# Patient Record
Sex: MALE | Race: WHITE | NOT HISPANIC OR LATINO | Employment: UNEMPLOYED | URBAN - METROPOLITAN AREA
[De-identification: names, ages, dates, MRNs, and addresses within clinical notes are randomized per-mention and may not be internally consistent; named-entity substitution may affect disease eponyms.]

---

## 2020-01-31 ENCOUNTER — OFFICE VISIT (OUTPATIENT)
Dept: URGENT CARE | Facility: CLINIC | Age: 45
End: 2020-01-31
Payer: COMMERCIAL

## 2020-01-31 VITALS
DIASTOLIC BLOOD PRESSURE: 66 MMHG | BODY MASS INDEX: 24.25 KG/M2 | OXYGEN SATURATION: 98 % | RESPIRATION RATE: 18 BRPM | TEMPERATURE: 99 F | HEART RATE: 58 BPM | HEIGHT: 68 IN | SYSTOLIC BLOOD PRESSURE: 117 MMHG | WEIGHT: 160 LBS

## 2020-01-31 DIAGNOSIS — Z20.2 STD EXPOSURE: Primary | ICD-10-CM

## 2020-01-31 PROBLEM — E78.6 LOW HDL (UNDER 40): Status: ACTIVE | Noted: 2019-12-10

## 2020-01-31 PROCEDURE — 96372 PR INJECTION,THERAP/PROPH/DIAG2ST, IM OR SUBCUT: ICD-10-PCS | Mod: S$GLB,,, | Performed by: FAMILY MEDICINE

## 2020-01-31 PROCEDURE — 96372 THER/PROPH/DIAG INJ SC/IM: CPT | Mod: S$GLB,,, | Performed by: FAMILY MEDICINE

## 2020-01-31 PROCEDURE — 87491 CHLMYD TRACH DNA AMP PROBE: CPT | Mod: 91

## 2020-01-31 PROCEDURE — 87661 TRICHOMONAS VAGINALIS AMPLIF: CPT

## 2020-01-31 PROCEDURE — 99213 PR OFFICE/OUTPT VISIT, EST, LEVL III, 20-29 MIN: ICD-10-PCS | Mod: 25,S$GLB,, | Performed by: FAMILY MEDICINE

## 2020-01-31 PROCEDURE — 99213 OFFICE O/P EST LOW 20 MIN: CPT | Mod: 25,S$GLB,, | Performed by: FAMILY MEDICINE

## 2020-01-31 PROCEDURE — 87591 N.GONORRHOEAE DNA AMP PROB: CPT

## 2020-01-31 RX ORDER — EMTRICITABINE AND TENOFOVIR DISOPROXIL FUMARATE 200; 300 MG/1; MG/1
1 TABLET, FILM COATED ORAL
COMMUNITY
Start: 2019-11-04

## 2020-01-31 RX ORDER — AZITHROMYCIN 500 MG/1
1000 TABLET, FILM COATED ORAL DAILY
Qty: 2 TABLET | Refills: 0 | Status: SHIPPED | OUTPATIENT
Start: 2020-01-31 | End: 2020-02-01

## 2020-01-31 RX ORDER — CEFTRIAXONE 250 MG/1
250 INJECTION, POWDER, FOR SOLUTION INTRAMUSCULAR; INTRAVENOUS
Status: COMPLETED | OUTPATIENT
Start: 2020-01-31 | End: 2020-01-31

## 2020-01-31 RX ORDER — KETOCONAZOLE 20 MG/ML
SHAMPOO, SUSPENSION TOPICAL
COMMUNITY
Start: 2020-01-06 | End: 2023-03-02

## 2020-01-31 RX ADMIN — CEFTRIAXONE 250 MG: 250 INJECTION, POWDER, FOR SOLUTION INTRAMUSCULAR; INTRAVENOUS at 01:01

## 2020-01-31 NOTE — PATIENT INSTRUCTIONS
Understanding STDs    When it comes to sex, nothing is risk-free. Any sexual contact with the penis, vagina, anus, or mouth can spread a sexually transmitted disease (STD). The only sure way to prevent STDs is abstinence (not having sex). But there are ways to make sex safer. Use a latex condom each time you have sex. And choose your partner wisely.  Use condoms for safer sex  If you have sex, latex condoms provide the best protection against STDs. Latex condoms stop the exchange of body fluids that carry certain STDs. They also limit contact with affected skin. Be aware though, a condom doesnt cover all skin. So, affected skin that is not covered can still transfer disease. But youre safer with a condom than without one. Use a condom even if you use other birth control. While birth control methods like the pill or IUD help prevent pregnancy, they do not protect against STDs.  Choose the right condom  Condoms made of latex prevent disease best. If youre allergic to latex, use polyurethane condoms instead. Male condoms fit over the penis. Female condoms line the vagina. Before buying a condom, read the label to be sure it prevents disease. Some novelty condoms dont.  The right lubricant helps  Buy lubricated condoms or use lubricant. This provides greater comfort and reduces the risk of condom breakage. Use only water-based lubricants. Dont use oil, lotion, or petroleum jelly. They can weaken the condom, causing breakage. Also, you may want to choose lubricants without nonoxynol-9. Its now known that this spermicide does not prevent disease and may cause irritation.  Use condoms correctly  For condoms to work, they must be used the right way. Keep these tips in mind:  · Use a new latex condom each time you have sex. Slip the condom on the penis before any contact is made.  · When ready to withdraw, hold the rim of the condom as the penis pulls out. This prevents the condom from slipping off.  · Check the  expiration date before using a condom.  · Dont store condoms in places that can get hot, such as a car or a wallet that is carried in a back pocket.  Get to know your partner  Safer sex is a process. It involves getting to know your partner and making informed choices. Ask each other how many partners you have had in the past, and how many you have now. Find out if either of you has an STD. If you decide to have sex, use a condom each time. Dont stop using condoms unless youre sure neither of you has other partners and youve both been tested to confirm you dont have STDs. Then stay free of disease by having sex only with each other (monogamy).  Keep your cool  Dont let alcohol or drugs cloud your judgment. They could lead you to have sex with someone you wouldnt have chosen if you were sober. Or, you might forget to use a condom. If you do plan to have sex, keep a latex condom with you. Dont wait until youre in the heat of passion to try to find one.  Consider abstinence  The only way to be sure you wont get an STD is to abstain from sex. Abstinence is a choice that many people make at some point in their lives. Maybe you want to wait until you are sure youre ready before you have sex. Maybe youd like a break from the responsibilities of sex for a while. Or maybe you just want to know your partner better before taking the next step. Abstinence is a choice you can make now to protect your future.  Date Last Reviewed: 12/1/2016  © 4872-2957 [employer]. [address] [place] [address], [place] [address]. All rights reserved. This information is not intended as a substitute for professional medical care. Always follow your healthcare professional's instructions.

## 2020-01-31 NOTE — PROGRESS NOTES
"Subjective:       Patient ID: Thomas Taveras is a 44 y.o. male.    Vitals:  height is 5' 8" (1.727 m) and weight is 72.6 kg (160 lb). His temperature is 98.6 °F (37 °C). His blood pressure is 117/66 and his pulse is 58 (abnormal). His respiration is 18 and oxygen saturation is 98%.     Chief Complaint: Exposure to STD    Pt had unprotected sex 3 days ago and is concerned about std exposure. Pt is not having any symptoms but was told that his sexual partner was having std symptoms.    Exposure to STD   This is a new problem. The current episode started in the past 7 days. The patient is experiencing no pain. Pertinent negatives include no chest pain, chills, coughing, diarrhea, dysuria, fever, frequency, headaches, nausea, rash, shortness of breath, sore throat, urgency or vomiting. Nothing aggravates the symptoms. He has tried nothing for the symptoms. He is sexually active. He inconsistently uses condoms. It is unknown whether or not his partner has an STD.       Constitution: Negative for chills, fatigue and fever.   HENT: Negative for congestion and sore throat.    Neck: Negative for painful lymph nodes.   Cardiovascular: Negative for chest pain and leg swelling.   Eyes: Negative for double vision and blurred vision.   Respiratory: Negative for cough and shortness of breath.    Gastrointestinal: Negative for nausea, vomiting and diarrhea.   Genitourinary: Negative for dysuria, frequency and urgency.   Musculoskeletal: Negative for joint pain, joint swelling, muscle cramps and muscle ache.   Skin: Negative for color change, pale and rash.   Allergic/Immunologic: Negative for seasonal allergies.   Neurological: Negative for dizziness, history of vertigo, light-headedness, passing out and headaches.   Hematologic/Lymphatic: Negative for swollen lymph nodes, easy bruising/bleeding and history of blood clots. Does not bruise/bleed easily.   Psychiatric/Behavioral: Negative for nervous/anxious, sleep disturbance and " depression. The patient is not nervous/anxious.        Objective:      Physical Exam   Constitutional: He appears well-developed and well-nourished.   Genitourinary:   Genitourinary Comments: Deferred -pt without symptoms   Psychiatric: He has a normal mood and affect. His behavior is normal. Judgment and thought content normal.   Nursing note and vitals reviewed.        Assessment:       1. STD exposure        Plan:         STD exposure  -     C. trachomatis/N. gonorrhoeae by AMP DNA  -     Trichomonas vaginalis, RNA, Qual, Urine (Males Only)  -     Misc. C trach/N gonor Amplified RNA Rectal  -     cefTRIAXone injection 250 mg  -     azithromycin (ZITHROMAX) 500 MG tablet; Take 2 tablets (1,000 mg total) by mouth once daily. for 1 day  Dispense: 2 tablet; Refill: 0      Patient Instructions     Understanding STDs    When it comes to sex, nothing is risk-free. Any sexual contact with the penis, vagina, anus, or mouth can spread a sexually transmitted disease (STD). The only sure way to prevent STDs is abstinence (not having sex). But there are ways to make sex safer. Use a latex condom each time you have sex. And choose your partner wisely.  Use condoms for safer sex  If you have sex, latex condoms provide the best protection against STDs. Latex condoms stop the exchange of body fluids that carry certain STDs. They also limit contact with affected skin. Be aware though, a condom doesnt cover all skin. So, affected skin that is not covered can still transfer disease. But youre safer with a condom than without one. Use a condom even if you use other birth control. While birth control methods like the pill or IUD help prevent pregnancy, they do not protect against STDs.  Choose the right condom  Condoms made of latex prevent disease best. If youre allergic to latex, use polyurethane condoms instead. Male condoms fit over the penis. Female condoms line the vagina. Before buying a condom, read the label to be sure it  prevents disease. Some novelty condoms dont.  The right lubricant helps  Buy lubricated condoms or use lubricant. This provides greater comfort and reduces the risk of condom breakage. Use only water-based lubricants. Dont use oil, lotion, or petroleum jelly. They can weaken the condom, causing breakage. Also, you may want to choose lubricants without nonoxynol-9. Its now known that this spermicide does not prevent disease and may cause irritation.  Use condoms correctly  For condoms to work, they must be used the right way. Keep these tips in mind:  · Use a new latex condom each time you have sex. Slip the condom on the penis before any contact is made.  · When ready to withdraw, hold the rim of the condom as the penis pulls out. This prevents the condom from slipping off.  · Check the expiration date before using a condom.  · Dont store condoms in places that can get hot, such as a car or a wallet that is carried in a back pocket.  Get to know your partner  Safer sex is a process. It involves getting to know your partner and making informed choices. Ask each other how many partners you have had in the past, and how many you have now. Find out if either of you has an STD. If you decide to have sex, use a condom each time. Dont stop using condoms unless youre sure neither of you has other partners and youve both been tested to confirm you dont have STDs. Then stay free of disease by having sex only with each other (monogamy).  Keep your cool  Dont let alcohol or drugs cloud your judgment. They could lead you to have sex with someone you wouldnt have chosen if you were sober. Or, you might forget to use a condom. If you do plan to have sex, keep a latex condom with you. Dont wait until youre in the heat of passion to try to find one.  Consider abstinence  The only way to be sure you wont get an STD is to abstain from sex. Abstinence is a choice that many people make at some point in their lives. Maybe  you want to wait until you are sure youre ready before you have sex. Maybe youd like a break from the responsibilities of sex for a while. Or maybe you just want to know your partner better before taking the next step. Abstinence is a choice you can make now to protect your future.  Date Last Reviewed: 12/1/2016  © 3995-6634 [employer]. [address] [place] [address]. All rights reserved. This information is not intended as a substitute for professional medical care. Always follow your healthcare professional's instructions.

## 2020-02-03 LAB
C TRACH DNA SPEC QL NAA+PROBE: NOT DETECTED
N GONORRHOEA DNA SPEC QL NAA+PROBE: NOT DETECTED

## 2020-02-04 LAB
C TRACH RRNA SPEC QL NAA+PROBE: NEGATIVE
C.TRACH RNA SOURCE: NORMAL
N.GONORROHEAE, AMP RNA SOURCE: NORMAL
N.GONORROHEAE, MISC. AMP RNA: NEGATIVE
T VAGINALIS RRNA SPEC QL NAA+PROBE: NOT DETECTED
TRICHOMONAS VAGINALIS RNA, QUAL, SOURCE: NORMAL

## 2020-02-05 ENCOUNTER — TELEPHONE (OUTPATIENT)
Dept: URGENT CARE | Facility: CLINIC | Age: 45
End: 2020-02-05

## 2022-10-24 DIAGNOSIS — M25.561 RIGHT KNEE PAIN, UNSPECIFIED CHRONICITY: Primary | ICD-10-CM

## 2022-10-25 ENCOUNTER — OFFICE VISIT (OUTPATIENT)
Dept: ORTHOPEDICS | Facility: CLINIC | Age: 47
End: 2022-10-25
Payer: COMMERCIAL

## 2022-10-25 ENCOUNTER — HOSPITAL ENCOUNTER (OUTPATIENT)
Dept: RADIOLOGY | Facility: HOSPITAL | Age: 47
Discharge: HOME | End: 2022-10-25
Attending: ORTHOPAEDIC SURGERY
Payer: COMMERCIAL

## 2022-10-25 VITALS
BODY MASS INDEX: 24.26 KG/M2 | SYSTOLIC BLOOD PRESSURE: 128 MMHG | HEIGHT: 68 IN | DIASTOLIC BLOOD PRESSURE: 74 MMHG | WEIGHT: 160.06 LBS

## 2022-10-25 DIAGNOSIS — M25.561 RIGHT KNEE PAIN, UNSPECIFIED CHRONICITY: ICD-10-CM

## 2022-10-25 DIAGNOSIS — M23.91 INTERNAL DERANGEMENT OF RIGHT KNEE: Primary | ICD-10-CM

## 2022-10-25 PROCEDURE — 73564 X-RAY EXAM KNEE 4 OR MORE: CPT | Mod: TC,FY,RT

## 2022-10-25 PROCEDURE — 3078F PR MOST RECENT DIASTOLIC BLOOD PRESSURE < 80 MM HG: ICD-10-PCS | Mod: CPTII,S$GLB,, | Performed by: ORTHOPAEDIC SURGERY

## 2022-10-25 PROCEDURE — 1159F MED LIST DOCD IN RCRD: CPT | Mod: CPTII,S$GLB,, | Performed by: ORTHOPAEDIC SURGERY

## 2022-10-25 PROCEDURE — 73564 X-RAY EXAM KNEE 4 OR MORE: CPT | Mod: 26,RT,, | Performed by: RADIOLOGY

## 2022-10-25 PROCEDURE — 1160F PR REVIEW ALL MEDS BY PRESCRIBER/CLIN PHARMACIST DOCUMENTED: ICD-10-PCS | Mod: CPTII,S$GLB,, | Performed by: ORTHOPAEDIC SURGERY

## 2022-10-25 PROCEDURE — 73564 XR KNEE COMP 4 OR MORE VIEWS RIGHT: ICD-10-PCS | Mod: 26,RT,, | Performed by: RADIOLOGY

## 2022-10-25 PROCEDURE — 99999 PR PBB SHADOW E&M-EST. PATIENT-LVL III: ICD-10-PCS | Mod: PBBFAC,,, | Performed by: ORTHOPAEDIC SURGERY

## 2022-10-25 PROCEDURE — 99204 PR OFFICE/OUTPT VISIT, NEW, LEVL IV, 45-59 MIN: ICD-10-PCS | Mod: S$GLB,,, | Performed by: ORTHOPAEDIC SURGERY

## 2022-10-25 PROCEDURE — 3074F SYST BP LT 130 MM HG: CPT | Mod: CPTII,S$GLB,, | Performed by: ORTHOPAEDIC SURGERY

## 2022-10-25 PROCEDURE — 1159F PR MEDICATION LIST DOCUMENTED IN MEDICAL RECORD: ICD-10-PCS | Mod: CPTII,S$GLB,, | Performed by: ORTHOPAEDIC SURGERY

## 2022-10-25 PROCEDURE — 99999 PR PBB SHADOW E&M-EST. PATIENT-LVL III: CPT | Mod: PBBFAC,,, | Performed by: ORTHOPAEDIC SURGERY

## 2022-10-25 PROCEDURE — 1160F RVW MEDS BY RX/DR IN RCRD: CPT | Mod: CPTII,S$GLB,, | Performed by: ORTHOPAEDIC SURGERY

## 2022-10-25 PROCEDURE — 99204 OFFICE O/P NEW MOD 45 MIN: CPT | Mod: S$GLB,,, | Performed by: ORTHOPAEDIC SURGERY

## 2022-10-25 PROCEDURE — 3074F PR MOST RECENT SYSTOLIC BLOOD PRESSURE < 130 MM HG: ICD-10-PCS | Mod: CPTII,S$GLB,, | Performed by: ORTHOPAEDIC SURGERY

## 2022-10-25 PROCEDURE — 3078F DIAST BP <80 MM HG: CPT | Mod: CPTII,S$GLB,, | Performed by: ORTHOPAEDIC SURGERY

## 2022-10-25 NOTE — PROGRESS NOTES
"Patient ID:   Thomas Taveras is a 47 y.o. male.    Chief Complaint:   Right knee pain    HPI:   The patient is an active 47-year-old male who is presenting today secondary to right knee pain.  He reports a remote injury to the right knee 4 years ago.  He is not sure felt a pop at the time recently, he was participating in [employer] jujitsu when his right lower extremity sort of got tangled up with another fighter.  Since this incident, he has had increased pain, catching, and giving way episodes.  He reports significant pain if he twists on the right knee.  He also has a remote history of a left knee injury while skiing over 10 years ago.    Medications:    Current Outpatient Medications:     emtricitabine-tenofovir 200-300 mg (TRUVADA) 200-300 mg Tab, Take 1 tablet by mouth., Disp: , Rfl:     ketoconazole (NIZORAL) 2 % shampoo, , Disp: , Rfl:     Allergies:  Review of patient's allergies indicates:  No Known Allergies    Past Medical History:  History reviewed. No pertinent past medical history.     Past Surgical History:  History reviewed. No pertinent surgical history.    Social History:  Social History     Occupational History    Not on file   Tobacco Use    Smoking status: Never    Smokeless tobacco: Never   Substance and Sexual Activity    Alcohol use: Yes    Drug use: Never    Sexual activity: Not on file       Family History:  Family History   Problem Relation Age of Onset    No Known Problems Mother     No Known Problems Father         ROS:  Review of Systems   Musculoskeletal:  Positive for joint pain, joint swelling and myalgias.   Neurological:  Negative for numbness and paresthesias.   All other systems reviewed and are negative.    Vitals:  /74   Ht 5' 8" (1.727 m)   Wt 72.6 kg (160 lb 0.9 oz)   BMI 24.34 kg/m²     Physical Examination:  Comprehensive Orthopaedic Musculoskeletal Exam    General      Constitutional: appears stated age, well-developed and well-nourished    Scleral icterus: no    " Labored breathing: no    Psychiatric: normal mood and affect and no acute distress    Neurological: alert and oriented x3    Skin: intact    Lymphadenopathy: none   Ortho Exam   Right knee exam:   Mild effusion.    No tenderness to palpation on the medial or lateral joint line today.  He has a catch laterally with Deepali's maneuver but no pain.  Range of motion is from 0-144 degrees.  Range of motion on the left side is 0-145 degrees.    Lachman's 2B.  Pivot shift 2+.  Negative posterior drawer.  Negative dial test.  No medial or lateral laxity at 30° of flexion.    Imaging:  I have ordered and independently reviewed the following imaging studies performed at [facility] today    X-Ray Knee Complete 4 Or More Views Right  Narrative: EXAMINATION:  XR KNEE COMP 4 OR MORE VIEWS RIGHT    CLINICAL HISTORY:  Pain in right knee    TECHNIQUE:  Right knee radiograph series.    COMPARISON:  None    FINDINGS:  Normal bony alignment.  Knee joint cartilage spaces appear maintained tiny patellar marginal osteophytes.  No acute fracture or dislocation.  Suspected suprapatellar effusion with nonspecific vague density projecting at the medial compartment on frontal view with some relative subcortical lucency at the marginal lateral tibial plateau.  This is overall nonspecific and further correlation with MRI suggested.  Impression: As above.    This report was flagged in Epic as abnormal.    Electronically signed by: Miyk Rayo  Date:    10/25/2022  Time:    09:21      Assessment:  1. Internal derangement of right knee      Plan:  Patient's examination is consistent with a tear of the ACL.  In addition he may have sustained a meniscus tear.  I recommended MRI scan to further evaluate.  I will contact him after the MRI scan is completed.    Orders Placed This Encounter    MRI Knee Without Contrast Right     No follow-ups on file.

## 2022-11-01 ENCOUNTER — TELEPHONE (OUTPATIENT)
Dept: ORTHOPEDICS | Facility: CLINIC | Age: 47
End: 2022-11-01
Payer: COMMERCIAL

## 2022-11-01 NOTE — TELEPHONE ENCOUNTER
----- Message from Faustina Carter sent at 11/1/2022  1:06 PM CDT -----  Regarding: call back  Contact: [phone]  Type:  Patient Returning Call    Who Called: PT   Who Left Message for Patient: Nurse   Does the patient know what this is regarding?: Yes   Would the patient rather a call back or a response via [facility]? Call back   Best Call Back Number: [phone]  Additional Information:

## 2022-11-03 ENCOUNTER — TELEPHONE (OUTPATIENT)
Dept: ORTHOPEDICS | Facility: CLINIC | Age: 47
End: 2022-11-03
Payer: COMMERCIAL

## 2022-11-03 NOTE — TELEPHONE ENCOUNTER
----- Message from Tejinder Quintana sent at 11/3/2022 11:13 AM CDT -----  Type:  Send MRI TO DIS    Who Called:tien quinn  Would the patient rather a call back or a response via [facility]? Call back   Best Call Back Number: [facility] # auth # [id number]  Additional Information:   Need order for approval   Requesting to get mri location moved to dis (diagnostic imaging services)   Please send to Phone # [phone]   fax

## 2022-11-04 ENCOUNTER — TELEPHONE (OUTPATIENT)
Dept: ORTHOPEDICS | Facility: CLINIC | Age: 47
End: 2022-11-04
Payer: COMMERCIAL

## 2022-11-04 NOTE — TELEPHONE ENCOUNTER
----- Message from Elly Alvarado, Patient Care Assistant sent at 11/4/2022  9:44 AM CDT -----  Type:  Send MRI TO DIS     Who Called:tien quinn  Would the patient rather a call back or a response via [facility]? Call back   Best Call Back Number: [facility] # auth # [id number]  Additional Information:   Need order for approval   Requesting to get mri location moved to dis (diagnostic imaging services)   Please send to Phone # [phone]   fax

## 2022-11-07 ENCOUNTER — TELEPHONE (OUTPATIENT)
Dept: ORTHOPEDICS | Facility: CLINIC | Age: 47
End: 2022-11-07
Payer: COMMERCIAL

## 2022-11-07 NOTE — TELEPHONE ENCOUNTER
----- Message from Faustina Carter sent at 11/7/2022  9:28 AM CST -----  Regarding: call back  Contact: [phone]  Type:  Send MRI TO DIS     Who Called:tien forman/ Chelsi Kidd  Would the patient rather a call back or a response via [facility]? Call back   Best Call Back Number: [facility] #[phone] auth # [id number]  Additional Information:   Need order for approval   Requesting to get mri location moved to dis (diagnostic imaging services)   Please send to Phone # [phone]   fax  fax [phone]

## 2022-11-07 NOTE — TELEPHONE ENCOUNTER
----- Message from Marisabel Jason sent at 11/7/2022 11:19 AM CST -----  Type:  Needs Medical Advice    Who Called: patient  Would the patient rather a call back or a response via [facility]? call  Best Call Back Number: [phone]  Additional Information: The MRI has been approved for Diagnostic Imaging on the [facility]----He is wondering who is going to set it up.

## 2022-11-08 ENCOUNTER — TELEPHONE (OUTPATIENT)
Dept: ORTHOPEDICS | Facility: CLINIC | Age: 47
End: 2022-11-08
Payer: COMMERCIAL

## 2022-11-08 NOTE — TELEPHONE ENCOUNTER
----- Message from Tejinder Quintana sent at 11/8/2022 10:30 AM CST -----  Type:  Patient Returning Call    Who Called:Chelsi HYATT (diagnostic imaging services)  Would the patient rather a call back or a response via [facility]? Call   Best Call Back Number:  [phone] PHONE# [phone]  Additional Information:   Caller stated they received the prescription / clinic notes and not the orders   Requesting orders for mri w/o contrast

## 2022-11-23 ENCOUNTER — OFFICE VISIT (OUTPATIENT)
Dept: ORTHOPEDICS | Facility: CLINIC | Age: 47
End: 2022-11-23
Payer: COMMERCIAL

## 2022-11-23 VITALS
DIASTOLIC BLOOD PRESSURE: 74 MMHG | HEART RATE: 58 BPM | HEIGHT: 68 IN | SYSTOLIC BLOOD PRESSURE: 124 MMHG | WEIGHT: 160.06 LBS | BODY MASS INDEX: 24.26 KG/M2

## 2022-11-23 DIAGNOSIS — S83.241A TEAR OF MEDIAL MENISCUS OF RIGHT KNEE, CURRENT, UNSPECIFIED TEAR TYPE, INITIAL ENCOUNTER: ICD-10-CM

## 2022-11-23 DIAGNOSIS — S83.511A COMPLETE TEAR OF ANTERIOR CRUCIATE LIGAMENT OF RIGHT KNEE, INITIAL ENCOUNTER: Primary | ICD-10-CM

## 2022-11-23 DIAGNOSIS — S83.281D TEAR OF LATERAL MENISCUS OF RIGHT KNEE, CURRENT, UNSPECIFIED TEAR TYPE, SUBSEQUENT ENCOUNTER: ICD-10-CM

## 2022-11-23 PROCEDURE — 99214 OFFICE O/P EST MOD 30 MIN: CPT | Mod: S$GLB,,, | Performed by: ORTHOPAEDIC SURGERY

## 2022-11-23 PROCEDURE — 1160F PR REVIEW ALL MEDS BY PRESCRIBER/CLIN PHARMACIST DOCUMENTED: ICD-10-PCS | Mod: CPTII,S$GLB,, | Performed by: ORTHOPAEDIC SURGERY

## 2022-11-23 PROCEDURE — 3008F PR BODY MASS INDEX (BMI) DOCUMENTED: ICD-10-PCS | Mod: CPTII,S$GLB,, | Performed by: ORTHOPAEDIC SURGERY

## 2022-11-23 PROCEDURE — 1160F RVW MEDS BY RX/DR IN RCRD: CPT | Mod: CPTII,S$GLB,, | Performed by: ORTHOPAEDIC SURGERY

## 2022-11-23 PROCEDURE — 1159F PR MEDICATION LIST DOCUMENTED IN MEDICAL RECORD: ICD-10-PCS | Mod: CPTII,S$GLB,, | Performed by: ORTHOPAEDIC SURGERY

## 2022-11-23 PROCEDURE — 99999 PR PBB SHADOW E&M-EST. PATIENT-LVL IV: ICD-10-PCS | Mod: PBBFAC,,, | Performed by: ORTHOPAEDIC SURGERY

## 2022-11-23 PROCEDURE — 3008F BODY MASS INDEX DOCD: CPT | Mod: CPTII,S$GLB,, | Performed by: ORTHOPAEDIC SURGERY

## 2022-11-23 PROCEDURE — 3074F SYST BP LT 130 MM HG: CPT | Mod: CPTII,S$GLB,, | Performed by: ORTHOPAEDIC SURGERY

## 2022-11-23 PROCEDURE — 99999 PR PBB SHADOW E&M-EST. PATIENT-LVL IV: CPT | Mod: PBBFAC,,, | Performed by: ORTHOPAEDIC SURGERY

## 2022-11-23 PROCEDURE — 3078F PR MOST RECENT DIASTOLIC BLOOD PRESSURE < 80 MM HG: ICD-10-PCS | Mod: CPTII,S$GLB,, | Performed by: ORTHOPAEDIC SURGERY

## 2022-11-23 PROCEDURE — 3078F DIAST BP <80 MM HG: CPT | Mod: CPTII,S$GLB,, | Performed by: ORTHOPAEDIC SURGERY

## 2022-11-23 PROCEDURE — 3074F PR MOST RECENT SYSTOLIC BLOOD PRESSURE < 130 MM HG: ICD-10-PCS | Mod: CPTII,S$GLB,, | Performed by: ORTHOPAEDIC SURGERY

## 2022-11-23 PROCEDURE — 1159F MED LIST DOCD IN RCRD: CPT | Mod: CPTII,S$GLB,, | Performed by: ORTHOPAEDIC SURGERY

## 2022-11-23 PROCEDURE — 99214 PR OFFICE/OUTPT VISIT, EST, LEVL IV, 30-39 MIN: ICD-10-PCS | Mod: S$GLB,,, | Performed by: ORTHOPAEDIC SURGERY

## 2022-11-28 ENCOUNTER — ANESTHESIA EVENT (OUTPATIENT)
Dept: SURGERY | Facility: HOSPITAL | Age: 47
End: 2022-11-28
Payer: COMMERCIAL

## 2022-11-28 RX ORDER — CEFAZOLIN SODIUM 2 G/50ML
2 SOLUTION INTRAVENOUS
Status: CANCELLED | OUTPATIENT
Start: 2022-11-28

## 2022-11-28 RX ORDER — SODIUM CHLORIDE 9 MG/ML
INJECTION, SOLUTION INTRAVENOUS CONTINUOUS
Status: CANCELLED | OUTPATIENT
Start: 2022-11-28

## 2022-11-28 NOTE — PROGRESS NOTES
"Patient ID:   Thomas Taveras is a 47 y.o. male.    Chief Complaint:   Follow-up evaluation for right knee injury    HPI:   Mr. Taveras is returning for evaluation of his right knee. He recently completed the MRI scan on the right knee. He reports improvement in his pain and swelling since his last visit.     Medications:    Current Outpatient Medications:     emtricitabine-tenofovir 200-300 mg (TRUVADA) 200-300 mg Tab, Take 1 tablet by mouth., Disp: , Rfl:     ketoconazole (NIZORAL) 2 % shampoo, , Disp: , Rfl:     Allergies:  Review of patient's allergies indicates:  No Known Allergies    Past Medical History:  History reviewed. No pertinent past medical history.     Past Surgical History:  History reviewed. No pertinent surgical history.    Social History:  Social History     Occupational History    Not on file   Tobacco Use    Smoking status: Never    Smokeless tobacco: Never   Substance and Sexual Activity    Alcohol use: Yes    Drug use: Never    Sexual activity: Not on file       Family History:  Family History   Problem Relation Age of Onset    No Known Problems Mother     No Known Problems Father         ROS:  Review of Systems   Musculoskeletal:  Positive for joint pain and joint swelling.   All other systems reviewed and are negative.    Vitals:  /74 (BP Location: Left arm, Patient Position: Sitting, BP Method: Large (Automatic))   Pulse (!) 58   Ht 5' 8" (1.727 m)   Wt 72.6 kg (160 lb 0.9 oz)   BMI 24.34 kg/m²     Physical Examination:  Comprehensive Orthopaedic Musculoskeletal Exam    General      Constitutional: appears stated age, well-developed and well-nourished    Scleral icterus: no    Labored breathing: no    Psychiatric: normal mood and affect and no acute distress    Neurological: alert and oriented x3    Skin: intact    Lymphadenopathy: none     Imaging:    I have ordered and independently reviewed the following imaging studies performed at [facility]:    STUDY  MRI RIGHT KNEE without " contrast.    CLINICAL INDICATION  Right knee pain since twisting injury in September 2022.    COMPARISON  No relevant imaging studies are available to me for review.    PROCEDURE DETAILS  Multiplanar multisequence MRI of the right knee was performed on a 1.5 Patricia GE scanner without contrast.    FINDINGS    BONES; CARTILAGE: Healing bone contusion of the posterior aspect of the lateral tibial plateau and metaphysis is evident.  The bones and articular cartilage are otherwise intact and unremarkable.  No fracture, osteonecrosis, or other acute process is evident.      MENISCI:   Medial meniscus: Focal thinning and contour deformity of the peripheral aspect of the posterior horn of the medial meniscus is consistent with tear.  The remainder of the meniscus appears grossly intact.  Lateral meniscus: Longitudinal vertical tear and fraying of the anterior horn; intact body and posterior horn.    CRUCIATE LIGAMENTS:   Anterior cruciate ligament (ACL): Subacute tear.  Posterior cruciate ligament (PCL): Intact and unremarkable for age.     COLLATERAL LIGAMENTS:  Medical collateral ligament complex (MCL): Intact and unremarkable.  Lateral collateral ligament complex (LCL): Intact and unremarkable.     JOINT FLUID: Moderate size joint effusion.  OTHER FLUID COLLECTIONS: None significant.      EXTENSION MECHANISM:   Quadriceps tendon: Intact and unremarkable for age.  Patellar tendon: Intact and unremarkable for age.  Medial patellofemoral ligament (MPFL)/retinaculum: Intact and unremarkable for age.  Lateral patellofemoral ligament (LPFL)/retinaculum: Intact and unremarkable for age.     FAT PADS: Unremarkable for age and body habitus.  OTHER: None significant.    IMPRESSION  1.  Subacute anterior cruciate ligament (ACL) tear and healing bone contusion of the lateral tibial plateau and metaphysis posteriorly.  2.  Medial meniscus tear.  3.  Lateral meniscus tear.  4.  Moderate size joint effusion.    Signature  Electronically  Signed: Wesley Zhou M.D. on 11-, 08:14 AM    Assessment:  1. Complete tear of anterior cruciate ligament of right knee, initial encounter    2. Tear of lateral meniscus of right knee, current, unspecified tear type, subsequent encounter    3. Tear of medial meniscus of right knee, current, unspecified tear type, initial encounter      Plan:  I have reviewed the MRI findings with Mr. Taveras. He is an active 47 year old male who desires a return to an active lifestyle. I have recommended right ACL reconstruction with bone patellar tendon autograft, medial and lateral meniscal treatment as indicated, and any other indicated procedures.     I have reviewed the risks, benefits, and alternatives in detail. He will contact the office if he wishes to proceed with surgery.           Orders Placed This Encounter    Diet NPO    Cleanse with Chlorhexidine (CHG)    Place GRACE hose    Place sequential compression device    Full code    IP VTE LOW RISK PATIENT    Case Request Operating Room: RECONSTRUCTION, KNEE, ACL, ARTHROSCOPIC WITH BTB AUTOGRAFT     No follow-ups on file.

## 2022-11-30 ENCOUNTER — TELEPHONE (OUTPATIENT)
Dept: ORTHOPEDICS | Facility: CLINIC | Age: 47
End: 2022-11-30
Payer: COMMERCIAL

## 2022-11-30 NOTE — TELEPHONE ENCOUNTER
Notified patient that surgery department will contact him on today after 3pm for details and time for surgery

## 2022-11-30 NOTE — TELEPHONE ENCOUNTER
----- Message from Raissa Gay sent at 11/30/2022  9:02 AM CST -----  Type:  Needs Medical Advice    Who Called: Pt  Would the patient rather a call back or a response via [facility]? call  Best Call Back Number: [phone]  Additional Information: pt calling regarding  Procedures for 12/01/2022 pt would like to know the time and location please call pt

## 2022-12-01 ENCOUNTER — HOSPITAL ENCOUNTER (OUTPATIENT)
Facility: HOSPITAL | Age: 47
Discharge: HOME | End: 2022-12-01
Attending: ORTHOPAEDIC SURGERY | Admitting: ORTHOPAEDIC SURGERY
Payer: COMMERCIAL

## 2022-12-01 ENCOUNTER — ANESTHESIA (OUTPATIENT)
Dept: SURGERY | Facility: HOSPITAL | Age: 47
End: 2022-12-01
Payer: COMMERCIAL

## 2022-12-01 DIAGNOSIS — S83.511A RIGHT ACL TEAR: ICD-10-CM

## 2022-12-01 DIAGNOSIS — S83.511A COMPLETE TEAR OF ANTERIOR CRUCIATE LIGAMENT OF RIGHT KNEE, INITIAL ENCOUNTER: Primary | ICD-10-CM

## 2022-12-01 DIAGNOSIS — M94.261 CHONDROMALACIA OF RIGHT KNEE: ICD-10-CM

## 2022-12-01 PROCEDURE — 25000003 PHARM REV CODE 250: Performed by: NURSE ANESTHETIST, CERTIFIED REGISTERED

## 2022-12-01 PROCEDURE — 64447 NJX AA&/STRD FEMORAL NRV IMG: CPT | Performed by: ANESTHESIOLOGY

## 2022-12-01 PROCEDURE — 63600175 PHARM REV CODE 636 W HCPCS: Performed by: NURSE ANESTHETIST, CERTIFIED REGISTERED

## 2022-12-01 PROCEDURE — 37000009 HC ANESTHESIA EA ADD 15 MINS: Performed by: ORTHOPAEDIC SURGERY

## 2022-12-01 PROCEDURE — 71000016 HC POSTOP RECOV ADDL HR: Performed by: ORTHOPAEDIC SURGERY

## 2022-12-01 PROCEDURE — 71000039 HC RECOVERY, EACH ADD'L HOUR: Performed by: ORTHOPAEDIC SURGERY

## 2022-12-01 PROCEDURE — 25000003 PHARM REV CODE 250: Performed by: STUDENT IN AN ORGANIZED HEALTH CARE EDUCATION/TRAINING PROGRAM

## 2022-12-01 PROCEDURE — 27201423 OPTIME MED/SURG SUP & DEVICES STERILE SUPPLY: Performed by: ORTHOPAEDIC SURGERY

## 2022-12-01 PROCEDURE — C1713 ANCHOR/SCREW BN/BN,TIS/BN: HCPCS | Performed by: ORTHOPAEDIC SURGERY

## 2022-12-01 PROCEDURE — 36000710: Performed by: ORTHOPAEDIC SURGERY

## 2022-12-01 PROCEDURE — 63600175 PHARM REV CODE 636 W HCPCS: Performed by: ANESTHESIOLOGY

## 2022-12-01 PROCEDURE — 71000015 HC POSTOP RECOV 1ST HR: Performed by: ORTHOPAEDIC SURGERY

## 2022-12-01 PROCEDURE — 36000711: Performed by: ORTHOPAEDIC SURGERY

## 2022-12-01 PROCEDURE — 71000033 HC RECOVERY, INTIAL HOUR: Performed by: ORTHOPAEDIC SURGERY

## 2022-12-01 PROCEDURE — C1769 GUIDE WIRE: HCPCS | Performed by: ORTHOPAEDIC SURGERY

## 2022-12-01 PROCEDURE — 97530 THERAPEUTIC ACTIVITIES: CPT

## 2022-12-01 PROCEDURE — 37000008 HC ANESTHESIA 1ST 15 MINUTES: Performed by: ORTHOPAEDIC SURGERY

## 2022-12-01 PROCEDURE — 63600175 PHARM REV CODE 636 W HCPCS: Performed by: ORTHOPAEDIC SURGERY

## 2022-12-01 PROCEDURE — D9220A PRA ANESTHESIA: Mod: CRNA,,, | Performed by: NURSE ANESTHETIST, CERTIFIED REGISTERED

## 2022-12-01 PROCEDURE — 76942 ECHO GUIDE FOR BIOPSY: CPT | Performed by: ANESTHESIOLOGY

## 2022-12-01 PROCEDURE — 29888 ARTHRS AID ACL RPR/AGMNTJ: CPT | Mod: RT,,, | Performed by: ORTHOPAEDIC SURGERY

## 2022-12-01 PROCEDURE — D9220A PRA ANESTHESIA: ICD-10-PCS | Mod: CRNA,,, | Performed by: NURSE ANESTHETIST, CERTIFIED REGISTERED

## 2022-12-01 PROCEDURE — D9220A PRA ANESTHESIA: ICD-10-PCS | Mod: ANES,,, | Performed by: ANESTHESIOLOGY

## 2022-12-01 PROCEDURE — D9220A PRA ANESTHESIA: Mod: ANES,,, | Performed by: ANESTHESIOLOGY

## 2022-12-01 PROCEDURE — 97161 PT EVAL LOW COMPLEX 20 MIN: CPT

## 2022-12-01 PROCEDURE — 29888 PR KNEE SCOPE,AID ANT CRUCIATE REPAIR: ICD-10-PCS | Mod: RT,,, | Performed by: ORTHOPAEDIC SURGERY

## 2022-12-01 PROCEDURE — 97116 GAIT TRAINING THERAPY: CPT

## 2022-12-01 DEVICE — SCREW BONE MATRYX 7X25MM: Type: IMPLANTABLE DEVICE | Site: KNEE | Status: FUNCTIONAL

## 2022-12-01 DEVICE — SCREW BONE MATRYX 8X25MM: Type: IMPLANTABLE DEVICE | Site: KNEE | Status: FUNCTIONAL

## 2022-12-01 DEVICE — GUIDE FEMORAL BULLSEYE END: Type: IMPLANTABLE DEVICE | Site: KNEE | Status: FUNCTIONAL

## 2022-12-01 DEVICE — GUIDE GRAFTMAX XACTPIN FLEX: Type: IMPLANTABLE DEVICE | Site: KNEE | Status: FUNCTIONAL

## 2022-12-01 RX ORDER — DIPHENHYDRAMINE HYDROCHLORIDE 50 MG/ML
12.5 INJECTION INTRAMUSCULAR; INTRAVENOUS EVERY 6 HOURS PRN
Status: DISCONTINUED | OUTPATIENT
Start: 2022-12-01 | End: 2022-12-01 | Stop reason: HOSPADM

## 2022-12-01 RX ORDER — ROCURONIUM BROMIDE 10 MG/ML
INJECTION, SOLUTION INTRAVENOUS
Status: DISCONTINUED | OUTPATIENT
Start: 2022-12-01 | End: 2022-12-01

## 2022-12-01 RX ORDER — HYDROMORPHONE HYDROCHLORIDE 2 MG/ML
0.5 INJECTION, SOLUTION INTRAMUSCULAR; INTRAVENOUS; SUBCUTANEOUS EVERY 5 MIN PRN
Status: DISCONTINUED | OUTPATIENT
Start: 2022-12-01 | End: 2022-12-01 | Stop reason: HOSPADM

## 2022-12-01 RX ORDER — CEPHALEXIN 500 MG/1
500 CAPSULE ORAL EVERY 6 HOURS
Qty: 2 CAPSULE | Refills: 0 | Status: SHIPPED | OUTPATIENT
Start: 2022-12-01 | End: 2022-12-02

## 2022-12-01 RX ORDER — SODIUM CHLORIDE 0.9 % (FLUSH) 0.9 %
3 SYRINGE (ML) INJECTION
Status: DISCONTINUED | OUTPATIENT
Start: 2022-12-01 | End: 2022-12-01 | Stop reason: HOSPADM

## 2022-12-01 RX ORDER — ONDANSETRON 4 MG/1
8 TABLET, ORALLY DISINTEGRATING ORAL EVERY 8 HOURS PRN
Qty: 30 TABLET | Refills: 0 | Status: SHIPPED | OUTPATIENT
Start: 2022-12-01 | End: 2022-12-06

## 2022-12-01 RX ORDER — DEXAMETHASONE SODIUM PHOSPHATE 4 MG/ML
INJECTION, SOLUTION INTRA-ARTICULAR; INTRALESIONAL; INTRAMUSCULAR; INTRAVENOUS; SOFT TISSUE
Status: DISCONTINUED | OUTPATIENT
Start: 2022-12-01 | End: 2022-12-01

## 2022-12-01 RX ORDER — OXYCODONE HYDROCHLORIDE 5 MG/1
5 TABLET ORAL ONCE AS NEEDED
Status: COMPLETED | OUTPATIENT
Start: 2022-12-01 | End: 2022-12-01

## 2022-12-01 RX ORDER — EPINEPHRINE 1 MG/ML
INJECTION, SOLUTION, CONCENTRATE INTRAVENOUS
Status: DISCONTINUED | OUTPATIENT
Start: 2022-12-01 | End: 2022-12-01 | Stop reason: HOSPADM

## 2022-12-01 RX ORDER — ONDANSETRON 2 MG/ML
4 INJECTION INTRAMUSCULAR; INTRAVENOUS DAILY PRN
Status: DISCONTINUED | OUTPATIENT
Start: 2022-12-01 | End: 2022-12-01 | Stop reason: HOSPADM

## 2022-12-01 RX ORDER — CEFAZOLIN SODIUM 2 G/50ML
2 SOLUTION INTRAVENOUS
Status: COMPLETED | OUTPATIENT
Start: 2022-12-01 | End: 2022-12-01

## 2022-12-01 RX ORDER — SODIUM CHLORIDE 9 MG/ML
INJECTION, SOLUTION INTRAVENOUS CONTINUOUS
Status: DISCONTINUED | OUTPATIENT
Start: 2022-12-01 | End: 2022-12-01 | Stop reason: HOSPADM

## 2022-12-01 RX ORDER — FENTANYL CITRATE 50 UG/ML
INJECTION, SOLUTION INTRAMUSCULAR; INTRAVENOUS
Status: DISCONTINUED | OUTPATIENT
Start: 2022-12-01 | End: 2022-12-01

## 2022-12-01 RX ORDER — ACETAMINOPHEN 10 MG/ML
INJECTION, SOLUTION INTRAVENOUS
Status: DISCONTINUED | OUTPATIENT
Start: 2022-12-01 | End: 2022-12-01

## 2022-12-01 RX ORDER — PROPOFOL 10 MG/ML
VIAL (ML) INTRAVENOUS
Status: DISCONTINUED | OUTPATIENT
Start: 2022-12-01 | End: 2022-12-01

## 2022-12-01 RX ORDER — ONDANSETRON HYDROCHLORIDE 2 MG/ML
INJECTION, SOLUTION INTRAMUSCULAR; INTRAVENOUS
Status: DISCONTINUED | OUTPATIENT
Start: 2022-12-01 | End: 2022-12-01

## 2022-12-01 RX ORDER — LIDOCAINE HCL/PF 100 MG/5ML
SYRINGE (ML) INTRAVENOUS
Status: DISCONTINUED | OUTPATIENT
Start: 2022-12-01 | End: 2022-12-01

## 2022-12-01 RX ORDER — HYDROCODONE BITARTRATE AND ACETAMINOPHEN 5; 325 MG/1; MG/1
1 TABLET ORAL EVERY 6 HOURS PRN
Qty: 28 TABLET | Refills: 0 | Status: SHIPPED | OUTPATIENT
Start: 2022-12-01 | End: 2022-12-01 | Stop reason: HOSPADM

## 2022-12-01 RX ORDER — OXYCODONE AND ACETAMINOPHEN 5; 325 MG/1; MG/1
1 TABLET ORAL EVERY 6 HOURS PRN
Qty: 28 TABLET | Refills: 0 | Status: SHIPPED | OUTPATIENT
Start: 2022-12-01 | End: 2022-12-08

## 2022-12-01 RX ADMIN — ACETAMINOPHEN 1000 MG: 10 INJECTION, SOLUTION INTRAVENOUS at 09:12

## 2022-12-01 RX ADMIN — MIDAZOLAM 5 MG: 1 INJECTION INTRAMUSCULAR; INTRAVENOUS at 08:12

## 2022-12-01 RX ADMIN — SUGAMMADEX 200 MG: 100 INJECTION, SOLUTION INTRAVENOUS at 11:12

## 2022-12-01 RX ADMIN — ROPIVACAINE HYDROCHLORIDE 20 ML: 2 INJECTION, SOLUTION EPIDURAL; INFILTRATION at 08:12

## 2022-12-01 RX ADMIN — DEXAMETHASONE SODIUM PHOSPHATE 8 MG: 4 INJECTION, SOLUTION INTRA-ARTICULAR; INTRALESIONAL; INTRAMUSCULAR; INTRAVENOUS; SOFT TISSUE at 09:12

## 2022-12-01 RX ADMIN — OXYCODONE HYDROCHLORIDE 5 MG: 5 TABLET ORAL at 12:12

## 2022-12-01 RX ADMIN — PROPOFOL 50 MG: 10 INJECTION, EMULSION INTRAVENOUS at 11:12

## 2022-12-01 RX ADMIN — ROCURONIUM BROMIDE 40 MG: 10 INJECTION, SOLUTION INTRAVENOUS at 09:12

## 2022-12-01 RX ADMIN — FENTANYL CITRATE 50 MCG: 50 INJECTION, SOLUTION INTRAMUSCULAR; INTRAVENOUS at 09:12

## 2022-12-01 RX ADMIN — LIDOCAINE HYDROCHLORIDE 100 MG: 20 INJECTION, SOLUTION INTRAVENOUS at 09:12

## 2022-12-01 RX ADMIN — FENTANYL CITRATE 50 MCG: 50 INJECTION, SOLUTION INTRAMUSCULAR; INTRAVENOUS at 11:12

## 2022-12-01 RX ADMIN — SODIUM CHLORIDE, SODIUM LACTATE, POTASSIUM CHLORIDE, AND CALCIUM CHLORIDE: .6; .31; .03; .02 INJECTION, SOLUTION INTRAVENOUS at 08:12

## 2022-12-01 RX ADMIN — PROPOFOL 160 MG: 10 INJECTION, EMULSION INTRAVENOUS at 09:12

## 2022-12-01 RX ADMIN — ONDANSETRON 8 MG: 2 INJECTION INTRAMUSCULAR; INTRAVENOUS at 11:12

## 2022-12-01 RX ADMIN — PROPOFOL 20 MG: 10 INJECTION, EMULSION INTRAVENOUS at 11:12

## 2022-12-01 RX ADMIN — PROPOFOL 40 MG: 10 INJECTION, EMULSION INTRAVENOUS at 09:12

## 2022-12-01 RX ADMIN — CEFAZOLIN SODIUM 2 G: 2 SOLUTION INTRAVENOUS at 09:12

## 2022-12-01 NOTE — PATIENT INSTRUCTIONS
12/01/2022     Teodoro Marcus MD, FAAOS  Orthopedic Surgery & Sports Medicine  [address]  [place] [address]    Dear Mr. Thomas Taveras:    It has been my pleasure to provide your orthopaedic surgical care. The following instructions are meant to answer any questions that I may not have addressed at the time of surgery. Please read over the instructions and feel free to contact me if you have any questions or concerns pertaining to your care.     Wound care  The surgical incisions will be hidden beneath the bandages. Please change the bandages in 2 days. After changing the bandages, you may shower but do not soak the incisions. You may cover the incisions with Band-Aids and then re-wrap with the Ace wrap or a compressive knee sleeve.    Activity  You may put weight on the leg as tolerated. To help control swelling, it is recommended that you keep the extremity elevated above the heart level and to wear the Ace wrap or compressive knee sleeve. I also recommend that you ice the knee down for at least 20 minutes three times a day. Driving: It is recommended that you do not drive until you are no longer taking narcotic pain medications.    Medications  A prescription for pain medication (oxycodone/acetaminophen) has been provided. You may take 1 of these tablets every 6 hours as needed for pain. The pain medication may cause constipation. If constipation becomes a problem, a stool softener such as Colace may be obtained over the counter.   A prescription for ondansetron has been provided for potential nausea & vomiting. You may take 1 of these tablets every 8 hours as needed for nausea & vomiting.  A prescription for 2 doses of antibiotics (Keflex) has been provided. Take one tablet 8 hours after surgery and then the second tablet 8 hours later.  It is recommended that you take an enteric-coated aspirin 325 mg by mouth daily for four weeks after to surgery to help prevent blood clots.    Follow-up  Appointment  Please follow-up in about 2 weeks with Dr. Marcus for suture removal. You will be contacted about your next appointment.   Physical therapy has been ordered and you will be contact to schedule your first visit.   Contact information  If there are any questions pertaining to your care, please feel free to contact my office at [phone].    Thank you for trusting me as your surgeon. I look forward to seeing you in the near future.    Sincerely,    Teodoro Marcus MD, FAAOS    Residency   [facility] Department of Orthopedic Surgery  Orthopedic Surgeon, [employer] [place] [facility]  Head Team Physician, [facility]  [place], [place]  [email or web address]

## 2022-12-01 NOTE — H&P
"There are no changes in the H&P documented below. Plan to proceed with surgery as previously discussed.     Yves Diamond    Patient ID:   Thomas Taveras is a 47 y.o. male.     Chief Complaint:   Follow-up evaluation for right knee injury     HPI:   Mr. Taveras is returning for evaluation of his right knee. He recently completed the MRI scan on the right knee. He reports improvement in his pain and swelling since his last visit.      Medications:     Current Outpatient Medications:     emtricitabine-tenofovir 200-300 mg (TRUVADA) 200-300 mg Tab, Take 1 tablet by mouth., Disp: , Rfl:     ketoconazole (NIZORAL) 2 % shampoo, , Disp: , Rfl:      Allergies:  Review of patient's allergies indicates:  No Known Allergies     Past Medical History:  History reviewed. No pertinent past medical history.      Past Surgical History:  History reviewed. No pertinent surgical history.     Social History:  Social History           Occupational History    Not on file   Tobacco Use    Smoking status: Never    Smokeless tobacco: Never   Substance and Sexual Activity    Alcohol use: Yes    Drug use: Never    Sexual activity: Not on file         Family History:        Family History   Problem Relation Age of Onset    No Known Problems Mother      No Known Problems Father           ROS:  Review of Systems   Musculoskeletal:  Positive for joint pain and joint swelling.   All other systems reviewed and are negative.     Vitals:  /74 (BP Location: Left arm, Patient Position: Sitting, BP Method: Large (Automatic))   Pulse (!) 58   Ht 5' 8" (1.727 m)   Wt 72.6 kg (160 lb 0.9 oz)   BMI 24.34 kg/m²      Physical Examination:  Comprehensive Orthopaedic Musculoskeletal Exam     General      Constitutional: appears stated age, well-developed and well-nourished    Scleral icterus: no    Labored breathing: no    Psychiatric: normal mood and affect and no acute distress    Neurological: alert and oriented x3    Skin: intact    " Lymphadenopathy: none      Imaging:     I have ordered and independently reviewed the following imaging studies performed at [facility]:     STUDY  MRI RIGHT KNEE without contrast.     CLINICAL INDICATION  Right knee pain since twisting injury in September 2022.     COMPARISON  No relevant imaging studies are available to me for review.     PROCEDURE DETAILS  Multiplanar multisequence MRI of the right knee was performed on a 1.5 Patricia GE scanner without contrast.     FINDINGS     BONES; CARTILAGE: Healing bone contusion of the posterior aspect of the lateral tibial plateau and metaphysis is evident.  The bones and articular cartilage are otherwise intact and unremarkable.  No fracture, osteonecrosis, or other acute process is evident.       MENISCI:   Medial meniscus: Focal thinning and contour deformity of the peripheral aspect of the posterior horn of the medial meniscus is consistent with tear.  The remainder of the meniscus appears grossly intact.  Lateral meniscus: Longitudinal vertical tear and fraying of the anterior horn; intact body and posterior horn.     CRUCIATE LIGAMENTS:   Anterior cruciate ligament (ACL): Subacute tear.  Posterior cruciate ligament (PCL): Intact and unremarkable for age.      COLLATERAL LIGAMENTS:  Medical collateral ligament complex (MCL): Intact and unremarkable.  Lateral collateral ligament complex (LCL): Intact and unremarkable.      JOINT FLUID: Moderate size joint effusion.  OTHER FLUID COLLECTIONS: None significant.       EXTENSION MECHANISM:   Quadriceps tendon: Intact and unremarkable for age.  Patellar tendon: Intact and unremarkable for age.  Medial patellofemoral ligament (MPFL)/retinaculum: Intact and unremarkable for age.  Lateral patellofemoral ligament (LPFL)/retinaculum: Intact and unremarkable for age.      FAT PADS: Unremarkable for age and body habitus.  OTHER: None significant.     IMPRESSION  1.  Subacute anterior cruciate ligament (ACL) tear and healing bone contusion  of the lateral tibial plateau and metaphysis posteriorly.  2.  Medial meniscus tear.  3.  Lateral meniscus tear.  4.  Moderate size joint effusion.     Signature  Electronically Signed: Wesley Zhou M.D. on 11-, 08:14 AM     Assessment:  1. Complete tear of anterior cruciate ligament of right knee, initial encounter    2. Tear of lateral meniscus of right knee, current, unspecified tear type, subsequent encounter    3. Tear of medial meniscus of right knee, current, unspecified tear type, initial encounter       Plan:  I have reviewed the MRI findings with Mr. Taveras. He is an active 47 year old male who desires a return to an active lifestyle. I have recommended right ACL reconstruction with bone patellar tendon autograft, medial and lateral meniscal treatment as indicated, and any other indicated procedures.      I have reviewed the risks, benefits, and alternatives in detail. He will contact the office if he wishes to proceed with surgery.     I have reviewed the H&P. There are no interval changes to report.

## 2022-12-01 NOTE — ANESTHESIA PREPROCEDURE EVALUATION
12/01/2022  Thomas Taveras is a 47 y.o., male.      Pre-op Assessment    I have reviewed the Patient Summary Reports.     I have reviewed the Nursing Notes. I have reviewed the NPO Status.   I have reviewed the Medications.     Review of Systems  Anesthesia Hx:  No problems with previous Anesthesia    Cardiovascular:  Cardiovascular Normal     Pulmonary:  Pulmonary Normal    Hepatic/GI:  Hepatic/GI Normal    Endocrine:  Endocrine Normal        Physical Exam  General: Well nourished, Cooperative, Alert and Oriented    Airway:  Mallampati: II   Mouth Opening: Normal  TM Distance: Normal  Tongue: Normal    Chest/Lungs:  Clear to auscultation, Normal Respiratory Rate    Heart:  Rate: Normal  Rhythm: Regular Rhythm  Sounds: Normal        Anesthesia Plan  Type of Anesthesia, risks & benefits discussed:    Anesthesia Type: Gen ETT, Regional  Intra-op Monitoring Plan: Standard ASA Monitors  Post Op Pain Control Plan: multimodal analgesia and peripheral nerve block  Induction:  IV  Airway Plan: Direct  Informed Consent: Informed consent signed with the Patient and all parties understand the risks and agree with anesthesia plan.  All questions answered.   ASA Score: 2    Ready For Surgery From Anesthesia Perspective.     .

## 2022-12-01 NOTE — OP NOTE
Facility:  [facility]    Date of Surgery:  12/01/2022    Surgeon:  Teodoro Marcus MD    First Assistant:  Yves Diamond MD    Second Assistant:  David Burnett MD    Anesthesia:  GETA + Adductor Canal    Pre-operative Diagnosis:  Right complete anterior cruciate ligament tear  Right medial meniscus tear  Right lateral meniscus tear    Indication:  To improve knee stability    Post-operative Diagnosis:  Right complete anterior cruciate ligament tear  Right medial meniscus tear  Right lateral meniscus tear    Procedure:  Right anterior cruciate ligament reconstruction with bone patellar tendon bone autograft    The patient was identified in the pre-operative holding area. The correct extremity was marked and written informed consent was verified. The patient was transferred to the OR. The patient was placed on the OR table. General anesthesia was administered. The right knee demonstrated laxity with a 2B Lachmans and 2+ pivot. ROM was full. No collateral laxity. A tourniquet was placed. The thigh was secured in a leg zhu. The operative extremity was prepped and draped in the usual sterile fashion. A surgical timeout was performed to verify the correct extremity and administration of IV antibiotics within 30 minutes of surgery start time.     A standard anterolateral portal was first established. The scope was advanced into the patellofemoral compartment. There was an area of Grade III chondromalacia in the central portion of the trochlear groove. The patellar articular cartilage was smooth. No loose bodies were seen in the gutters. The scope was advanced into the medial compartment. Using an outside in technique, the anteromedial portal was established. The medial compartment was examined. The medial meniscus was intact. There was a small area of Grade III chondromalacia involving the medial femoral condyle. The medial tibial plateau was intact. The scope was driven into notch. The  ACL was completely torn. The PCL was intact. The scope was advanced into the lateral compartment. The articular cartilage and lateral meniscus were intact. A shaver was introduced and a shaving chondroplasty was performed on the areas of unstable cartilage in the trochlear and medial femoral condyle. The instruments were removed from the shoulder joint.     The extremity was exsanguinated. The tourniquet was inflated to 250 mm Hg. A midline incision was made from the inferior pole to the tibial tubercle. The paratenon was divided and reflected. The central 10mm of the patellar tendon was incised. 10 x 25 mm bone plugs were harvested from the patella and tibia. The graft was placed on the back table and prepared to fit within a 10 mm socket. The scope was placed back into the joint. Remnants of the ACL were debrided. A notchplasty was performed. An AM portal guide was introduced. A flexible beath pin was introduced and passed within the origin of the ACL. The pin position was deemed excellent. The pin was passed out the lateral thigh. A flexible 10 mm reamer was introduced over the beath pin and a 10 x 30 mm femoral socket was made. A passing suture was placed on the beath pin and passed through the femoral tunnel. The position of the tunnel appeared excellent. The posterior wall of the tunnel was intact. A tibial ACL guide was introduced and used to pass a guidepin into the ACL footprint. A 10 mm tibial tunnel was made with a coring reamer. The passing suture was retrieved through the tibial tunnel. The graft was passed. The graft was secured on the femoral side with a 7 x 25 mm Biointerference screw. The fixation was excellent. The knee was brought into full extension to verify to roof impingement. The knee was cycled. The knee was then brought into 20 degrees of flexion with a posterior drawer. The tibial bone plug was secured with an 8 x 25 mm Biointerference screw. Lachmans exam was negative. The graft was taut  to probing. The instruments were removed from the joint after it was drained. The paratenon was closed with 2-0 vicryl in a running fashion. Te SQ layer was closed with interrupted buried 2-0 vicryl suture. The skin was closed with a running monocryl and Steri-Strips. A sterile dressing was placed. The tourniquet was deflated. The patient was awakened and transferred to the PACU in stable condition.     EBL:  Less than 50 cc    Drains:  None    Complications:  None known

## 2022-12-01 NOTE — PLAN OF CARE
Pt's significant other wanted to talk to Dr. Marcus about what procedure was being performed, said it could wait until after procedure and that we could proceed with procedure. I made patient aware of what he consented for, he agreed before rolling back. Yves Diamond MD also spoke with patient in operating room about possibly fixing right ACL, which was consented for. Patient and significant other are aware and agreed to proceed with surgery.

## 2022-12-01 NOTE — ANESTHESIA PROCEDURE NOTES
Intubation    Date/Time: 12/1/2022 9:13 AM  Performed by: Janette Fofana CRNA  Authorized by: Adonay Moreau MD     Intubation:     Induction:  Intravenous    Intubated:  Postinduction    Mask Ventilation:  Easy mask    Attempts:  1    Attempted By:  Student    Method of Intubation:  Video laryngoscopy    Blade:  Talley 3    Laryngeal View Grade: Grade I - full view of cords      Difficult Airway Encountered?: No      Complications:  None    Airway Device:  Oral endotracheal tube    Airway Device Size:  8.0    Style/Cuff Inflation:  Cuffed (inflated to minimal occlusive pressure)    Tube secured:  23    Secured at:  The lips    Placement Verified By:  Capnometry    Complicating Factors:  None    Findings Post-Intubation:  BS equal bilateral and atraumatic/condition of teeth unchanged

## 2022-12-01 NOTE — TRANSFER OF CARE
"Anesthesia Transfer of Care Note    Patient: Thomas Taveras    Procedure(s) Performed: Procedure(s) (LRB):  RECONSTRUCTION, KNEE, ACL, ARTHROSCOPIC WITH BTB AUTOGRAFT (Right)  CHONDROPLASTY (Right)    Patient location: PACU    Anesthesia Type: general    Transport from OR: Transported from OR on 6-10 L/min O2 by face mask with adequate spontaneous ventilation    Post pain: adequate analgesia    Post assessment: no apparent anesthetic complications and tolerated procedure well    Post vital signs: stable    Level of consciousness: awake and sedated    Nausea/Vomiting: no nausea/vomiting    Complications: none    Transfer of care protocol was followed      Last vitals:   Visit Vitals  /77 (BP Location: Left arm, Patient Position: Sitting)   Pulse 64   Temp 36.8 °C (98.3 °F) (Temporal)   Resp 16   Ht 5' 8" (1.727 m)   Wt 72.6 kg (160 lb)   SpO2 98%   BMI 24.33 kg/m²     "

## 2022-12-01 NOTE — PT/OT/SLP PROGRESS
Physical Therapy Crutch Evaluation/Training    Thomas Taveras   MRN: [id number]   Admitting Diagnosis: <principal problem not specified>    PT Received On: 12/01/22  PT Start Time: 1429     PT Stop Time: 1520    PT Total Time (min): 51 min       Billable Minutes:  Evaluation 11, Gait Training 30, and Therapeutic Activity 10      Order: PT Eval and Treat  Order Date: 12/1/22    Precautions Weight Bearing Status: weight bearing as tolerated: right leg and in HKB locked into extension     Patient Active Problem List   Diagnosis    Low HDL (under 40)    Complete tear of right ACL    Chondromalacia of right knee     History reviewed. No pertinent past medical history.  History reviewed. No pertinent surgical history.    Subjective Information     Prior level of function: independent  Residence: lives with their spouse 1-story house/ trailer, number of outside stairs: 6 with B HR within reach, walk-in shower with BIS  Support available: family  Equipment owned: None  Mental Status: Oriented X 4 and Alert  Skin: Impaired: R knee post-op, HKB locked into extension, ACE bandage in place   Sensation: Intact  Posture: Good  Hearing: Intact  Vision:  Intact    Pain at time of assessment: pt reporting increased pressure in R knee but no pain    Objective findings/Assessment  Bed mobility: Suraj initially supine <> sit for RLE then pt able to progress to SBA with cueing for modified technique and demonstration to bring RLE onto bed using LLE hooked underneath   Transfers: sit <> stand with crutches and CGA-SBA; VC's and demonstration provided for hand placement and sequencing   Gross assessment: WFL  Standing balance: Good  ROM: RLE in HKB locked into extension, LLE WFL  Strength: LLE WFL; RLE NT  Patient requires crutch fitting and training.    Treatment  Gait: Pt ambulated ~50 ft total in room with B crutches and CGA progressed to SBA; VC's for 3 pt gait sequencing  Stairs: demonstration provided for stair negotiation with use of B  "hands on bed rail; pt ascended/descended an 8" step laterally with CGA progressed to SBA  Transfers: sit <> stand with crutches and CGA-SBA; VC's and demonstration provided for hand placement and sequencing  Therapeutic Exercise: discussed LLE exercises and B ankle pumps, however deferring RLE ROM/exercise to OP therapist and advised pt to safely progress with OP PT and per MD protocol   Education provided in form of: verbal instruction and visual demonstration; Pt educated on ice/rest/elevation, WBAT RLE and use of brace locked into extension, adaptive dsg post surgery, home safety, car/toilet/shower/tub t/fs, and safe use of DME for functional mobility and ADLs. Gait belt provided for safety entry of home this date and educated on proper use.       AM-PAC 6 CLICK MOBILITY  How much help from another person does this patient currently need?   1 = Unable, Total/Dependent Assistance  2 = A lot, Maximum/Moderate Assistance  3 = A little, Minimum/Contact Guard/Supervision  4 = None, Modified [place]/Independent    Turning over in bed (including adjusting bedclothes, sheets and blankets)?: 3  Sitting down on and standing up from a chair with arms (e.g., wheelchair, bedside commode, etc.): 3  Moving from lying on back to sitting on the side of the bed?: 3  Moving to and from a bed to a chair (including a wheelchair)?: 3  Need to walk in hospital room?: 3  Climbing 3-5 steps with a railing?: 3  Basic Mobility Total Score: 18     AM-PAC Raw Score CMS G-Code Modifier Level of Impairment Assistance   6 % Total / Unable   7 - 9 CM 80 - 100% Maximal Assist   10 - 14 CL 60 - 80% Moderate Assist   15 - 19 CK 40 - 60% Moderate Assist   20 - 22 CJ 20 - 40% Minimal Assist   23 CI 1-20% SBA / CGA   24 CH 0% Independent/ Mod I     Goals/Discharge Status  Patient safely and effectively ambulates with crutches with  standy by assistance,  weight bearing as tolerated: right leg on level surfaces.    Recommended " Plan:  Patient to be discharged to home with family support. Recommending OP PT.     12/01/2022

## 2022-12-02 ENCOUNTER — CLINICAL SUPPORT (OUTPATIENT)
Dept: REHABILITATION | Facility: HOSPITAL | Age: 47
End: 2022-12-02
Payer: COMMERCIAL

## 2022-12-02 VITALS
OXYGEN SATURATION: 97 % | HEIGHT: 68 IN | HEART RATE: 79 BPM | BODY MASS INDEX: 24.25 KG/M2 | TEMPERATURE: 98 F | WEIGHT: 160 LBS | DIASTOLIC BLOOD PRESSURE: 68 MMHG | RESPIRATION RATE: 17 BRPM | SYSTOLIC BLOOD PRESSURE: 113 MMHG

## 2022-12-02 DIAGNOSIS — S83.511A COMPLETE TEAR OF ANTERIOR CRUCIATE LIGAMENT OF RIGHT KNEE, INITIAL ENCOUNTER: ICD-10-CM

## 2022-12-02 DIAGNOSIS — M25.661 DECREASED ROM OF RIGHT KNEE: ICD-10-CM

## 2022-12-02 DIAGNOSIS — M25.561 RIGHT KNEE PAIN, UNSPECIFIED CHRONICITY: ICD-10-CM

## 2022-12-02 PROCEDURE — 97110 THERAPEUTIC EXERCISES: CPT

## 2022-12-02 PROCEDURE — 97161 PT EVAL LOW COMPLEX 20 MIN: CPT

## 2022-12-02 RX ORDER — MIDAZOLAM HYDROCHLORIDE 1 MG/ML
INJECTION, SOLUTION INTRAMUSCULAR; INTRAVENOUS
Status: DISCONTINUED | OUTPATIENT
Start: 2022-12-01 | End: 2022-12-02

## 2022-12-02 RX ORDER — ROPIVACAINE HYDROCHLORIDE 2 MG/ML
INJECTION, SOLUTION EPIDURAL; INFILTRATION; PERINEURAL
Status: DISCONTINUED | OUTPATIENT
Start: 2022-12-01 | End: 2022-12-02

## 2022-12-02 NOTE — PLAN OF CARE
[facility]  Physical Therapy Initial Evaluation    Date: 12/2/2022   Name: Thomas Taveras  Clinic Number: [id number]    Therapy Diagnosis: No diagnosis found.  Physician: Teodoro Marcus MD    Physician Orders: PT Eval and Treat   Medical Diagnosis from Referral: S83.511A (ICD-10-CM) - Complete tear of anterior cruciate ligament of right knee, initial encounter  Evaluation Date: 12/2/2022  Authorization Period Expiration: 12/01/2023  Plan of Care Expiration: 12/01/2023  Visit # / Visits authorized: 1/ 1    Time In: 0900  Time Out: 1000  Total Appointment Time (timed & untimed codes): 60 minutes    Precautions: Standard    Procedure:  Right anterior cruciate ligament reconstruction with bone patellar tendon bone autograft    Subjective   Date of surgery: 12/1/2022  POD: 1  History of current condition - Thomas reports:     Patient injured R knee during take down in [facility] around Labor day. Was able to perform ADLs, but had s/s of instability and swelling. Underwent R ACRL BTB graft yesterday. Post op pain is managed well with pain medication currently. He has been putting weight on leg as jina with gait and brace locked in extension.     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    Goals - ADLs and return to [facility]     Medical History:   No past medical history on file.    Surgical History:   Thomas Taveras  has no past surgical history on file.    Medications:   Thomas has a current medication list which includes the following prescription(s): cephalexin, emtricitabine-tenofovir 200-300 mg, ketoconazole, ondansetron, and oxycodone-acetaminophen.    Allergies:   Review of patient's allergies indicates:  No Known Allergies     Imaging, MRI studies, X-ray: see imaging    Prior Therapy: none  Social History:  lives with their partner  Occupation: Pharmaceutical Rep;   Prior Level of Function: [facility], Active, Working  out  Current Level of Function: WBAT with brace and crutches    Pain:  Current 2/10, worst 5/10, best 2/10   Location: { right knee(s)   Description: Aching, Dull, Tight, and Hot  Aggravating Factors: Sitting, Standing, Walking, Laying, Extension, Flexing, Lifting, and Getting out of bed/chair  Easing Factors: pain medication, ice, and rest    Pts goals: ADLs, Return to [facility]    Objective     Observation: Post-op dressing/brace in place. POD 1.    Gait: WBAT with brace in extension and bilateral crutches     Edema: Mod as expected post-op              Palpation: TTP PFJ     Sensation:  Intact; diminished 2/2 residual nerve block.    Range of Motion:   Knee Right Active Left Active Right Passive Left Passive   Flexion 10 145 35 145   Extension 0 +3 +3 +3      Ankle Right Active Left Active   DF WFL - LTD WNL   PF WNL WNL     Lower Extremity Strength:    Formal MMT not performed 2/2 POD#1 and increased pain.    Fair to Good Quad activation noted R LE.   Able to perform SLR with mod lag     Joint Mobility: hypomobile as expected post-op.     Flexibility:  Grossly hypomobile quad, hamstring and gastroc as expected on post-op R LE    Special Tests:  Not performed 2/2 post-op.    Functional tests:   SLS EO: Not performed 2/2 post-op.  SLS EC: Not performed 2/2 post-op.  SL Squat: Not performed 2/2 post-op.  DL Squat: Not performed 2/2 post-op.  Step-down: Not performed 2/2 post-op.        Limitation/Restriction for FOTO Knee Survey    Therapist reviewed FOTO scores for Thomas Taveras on 12/2/2022.   FOTO documents entered into [employer] - see Media section.    Limitation Score: See Media%         TREATMENT   Treatment Time In: 0920  Treatment Time Out: 1000  Total Treatment time (time-based codes) separate from Evaluation: 40 minutes    Date of surgery: 12/1/2022  POD: 1    Thomas received therapeutic exercises to develop strength, endurance, ROM, flexibility, posture, and core stabilization for 30 minutes  including:    Patient education:  - tissue healing timelines  - swelling management  - importance of knee extension, quad activation, knee flexion ROM  - infection and DVT prevention  - review of HEP in detail    Heel prop x5 min  Quad sets 30x5 sec  Ankle pumps 30x5 sec  Heel slide 30x5 sec    Thomas received cold pack for 10 minutes to R knee.    Home Exercises and Patient Education Provided    Education provided:   - see above    Written Home Exercises Provided: yes.  Exercises were reviewed and patient was able to demonstrate them prior to the end of the session.  Patient demonstrated good  understanding of the education provided.     See EMR under Patient Instructions for exercisesprovided 12/2/2022.    Assessment   Thomas is a 47 y.o. male referred to outpatient Physical Therapy with a medical diagnosis of S83.511A (ICD-10-CM) - Complete tear of anterior cruciate ligament of right knee, initial encounter. Pt presents with post op pain and swelling, decreased ROM, along with expected quad activation and strength deficits. Impairments and post op timelines preventing normal ADLs and return to higher level activities e.g. return to [facility]    Pt prognosis is Excellent.   Pt will benefit from skilled outpatient Physical Therapy to address the deficits stated above and in the chart below, provide pt/family education, and to maximize pt's level of independence.     Plan of care discussed with patient: Yes  Pt's spiritual, cultural and educational needs considered and patient is agreeable to the plan of care and goals as stated below:     Anticipated Barriers for therapy: none    Medical Necessity is demonstrated by the following  History  Co-morbidities and personal factors that may impact the plan of care Co-morbidities:   none    Personal Factors:   no deficits     low   Examination  Body Structures and Functions, activity limitations and participation restrictions that may impact the plan of care Body Regions:    lower extremities    Body Systems:    ROM  strength  balance  gait  transfers  transitions  motor control  motor learning  edema  scar formation    Participation Restrictions:   covid-19    Activity limitations:   Learning and applying knowledge  no deficits    General Tasks and Commands  no deficits    Communication  no deficits    Mobility  walking  driving (bike, car, motorcycle)    Self care  dressing    Domestic Life  no deficits    Interactions/Relationships  no deficits    Life Areas  no deficits    Community and Social Life  no deficits         high   Clinical Presentation stable and uncomplicated low   Decision Making/ Complexity Score: low     GOALS: Short Term Goals: 0-3 months  1.Report decreased R knee pain  < / =  1/10  to increase tolerance for amb  2. Increase knee ROM to 3-0-145 in order to be able to perform ADLs without difficulty.  3. Increase strength by 1/3 MMT grade in Quads to increase tolerance for ADL and work activities.  4. Able to amb w/o deviation or complaint  5. Pt to tolerate HEP to improve ROM and independence with ADL's     Long Term Goals: 3-9 months  1. Able to return to running linearly   2. Able to complete [place] return to sport testing  2.Patient goal: Return to [facility]  3.Increase strength to >/= 4+/5 in Quad and hip musculature to increase tolerance for ADL and work activities.  4. Pt will report at CJ level (20-40% impaired) on FOTO knee to demonstrate increase in LE function with every day tasks.       Plan   Plan of care Certification: 12/2/2022 to 12/1/2023.    Outpatient Physical Therapy 2-3 times weekly for 10 + weeks to include the following interventions: Gait Training, Manual Therapy, Moist Heat/ Ice, Neuromuscular Re-ed, Patient Education, Self Care, Therapeutic Activities, and Therapeutic Exercise.     YOLETTE MEDINA, PT, DPT, OCS

## 2022-12-02 NOTE — ANESTHESIA POSTPROCEDURE EVALUATION
Anesthesia Post Evaluation    Patient: Thomas Taveras    Procedure(s) Performed: Procedure(s) (LRB):  RECONSTRUCTION, KNEE, ACL, ARTHROSCOPIC WITH BTB AUTOGRAFT (Right)  CHONDROPLASTY (Right)    Final Anesthesia Type: general      Patient location during evaluation: PACU  Patient participation: Yes- Able to Participate  Level of consciousness: awake and alert, oriented and awake  Post-procedure vital signs: reviewed and stable  Pain management: adequate  Airway patency: patent    PONV status at discharge: No PONV  Anesthetic complications: no      Cardiovascular status: blood pressure returned to baseline  Respiratory status: unassisted and room air  Hydration status: euvolemic  Follow-up not needed.          Vitals Value Taken Time   /68 12/01/22 1601   Temp 36.7 °C (98 °F) 12/01/22 1601   Pulse 79 12/01/22 1601   Resp 17 12/01/22 1601   SpO2 97 % 12/01/22 1601         Event Time   Out of Recovery 13:00:00         Pain/Ilda Score: Pain Rating Prior to Med Admin: 6 (12/1/2022 12:50 PM)  Ilda Score: 10 (12/1/2022  4:01 PM)

## 2022-12-02 NOTE — ANESTHESIA PROCEDURE NOTES
Peripheral Block    Patient location during procedure: pre-op   Block not for primary anesthetic.  Reason for block: at surgeon's request and post-op pain management   Post-op Pain Location: right knee   Start time: 12/2/2022 8:48 AM  Timeout: 12/2/2022 8:45 AM   End time: 12/2/2022 8:51 AM    Staffing  Authorizing Provider: Adonay Moreau MD  Performing Provider: Adonay Moreau MD    Preanesthetic Checklist  Completed: patient identified, IV checked, site marked, risks and benefits discussed, surgical consent, monitors and equipment checked, pre-op evaluation and timeout performed  Peripheral Block  Patient position: supine  Prep: ChloraPrep  Patient monitoring: heart rate, cardiac monitor, continuous pulse ox, continuous capnometry and frequent blood pressure checks  Block type: adductor canal  Laterality: right  Injection technique: single shot  Needle  Needle type: Stimuplex   Needle gauge: 21 G  Needle length: 4 in  Needle localization: anatomical landmarks and ultrasound guidance   -ultrasound image captured on disc.  Assessment  Injection assessment: negative aspiration, negative parasthesia and local visualized surrounding nerve  Paresthesia pain: none  Heart rate change: no  Slow fractionated injection: yes    Medications:    Medications: ropivacaine (NAROPIN) solution 0.2% - Perineural   20 mL - 12/1/2022 8:49:00 AM    Additional Notes  VSS.  [facility] RN monitoring vitals throughout procedure.  Patient tolerated procedure well.

## 2022-12-06 ENCOUNTER — CLINICAL SUPPORT (OUTPATIENT)
Dept: REHABILITATION | Facility: HOSPITAL | Age: 47
End: 2022-12-06
Payer: COMMERCIAL

## 2022-12-06 DIAGNOSIS — M25.561 RIGHT KNEE PAIN, UNSPECIFIED CHRONICITY: Primary | ICD-10-CM

## 2022-12-06 DIAGNOSIS — M25.661 DECREASED ROM OF RIGHT KNEE: ICD-10-CM

## 2022-12-06 PROCEDURE — 97140 MANUAL THERAPY 1/> REGIONS: CPT

## 2022-12-06 PROCEDURE — 97110 THERAPEUTIC EXERCISES: CPT

## 2022-12-06 PROCEDURE — 97112 NEUROMUSCULAR REEDUCATION: CPT

## 2022-12-06 NOTE — PROGRESS NOTES
Physical Therapy Daily Treatment Note     Name: Thomas Taveras  Clinic Number: [id number]    Therapy Diagnosis: No diagnosis found.  Physician: Teodoro Marcus MD    Visit Date: 12/6/2022  Physician Orders: PT Eval and Treat   Medical Diagnosis from Referral: S83.511A (ICD-10-CM) - Complete tear of anterior cruciate ligament of right knee, initial encounter  Evaluation Date: 12/2/2022  Authorization Period Expiration: 12/01/2023  Plan of Care Expiration: 12/01/2023  Visit # / Visits authorized: 1/ 12     Time In: 1345  Time Out: 1500  Total Appointment Time (timed & untimed codes): 75 minutes - 10 min ice     Precautions: Standard     Procedure:  Right anterior cruciate ligament reconstruction with bone patellar tendon bone autograft    Subjective     Pt reports: Reports some swelling in leg. Has been diligent with HEP w/o complaint.    He was compliant with home exercise program.  Response to previous treatment: HEP complaince  Functional change: no changes    Pain: 5/10  Location: right knee      Objective     Date of surgery: 12/1/2022  POD: 5    PROM 5-0-55 deg     Thomas received therapeutic exercises to develop strength, endurance, ROM, flexibility, posture, and core stabilization for 25 minutes including:     Patient education:  - tissue healing timelines  - swelling management  - importance of knee extension, quad activation, knee flexion ROM  - infection and DVT prevention  - review of HEP in detail       Heel prop x10 min: with Ankle pumps YTB x10  Heel slide 5 min  SLR x10 post NMES  Sidelying hip abduction 3x10 - brace on       Thomas received the following manual therapy techniques: Joint mobilizations, Manual Lymphatic Drainage, and Soft tissue Mobilization were applied to the: R knee for 20 minutes, including:    Assessment  Brace adjustment  Redressing bandage  MLD to R LE  PFJ mobilizations grade I-II for swelling management    Thomas participated in neuromuscular re-education activities to improve:  Coordination, Kinesthetic, Sense, Proprioception, and Posture for 20 minutes. The following activities were included:    NMES Biphasic 10:10 sec:  Quad set x12 min  Quad with with SLR x8 min    Thomas received cold pack for 10 minutes to to decrease circulation, pain, and swelling.      Home Exercises Provided and Patient Education Provided     Education provided:   - see above    Written Home Exercises Provided: Patient instructed to cont prior HEP.  Exercises were reviewed and Thomas was able to demonstrate them prior to the end of the session.  Thomas demonstrated good  understanding of the education provided.     See EMR under Patient Instructions for exercises provided prior visit.    Assessment     Wayne all interventions well with noted reduction of LE swelling and improved quad contraction upon completion.     Swelling management stressed to patient and brace adjusted to help improve bloodflow.    Needs cont focus on swelling management, LE ROM, and quad activation / strength.    Thomas Is progressing well towards his goals.   Pt prognosis is Excellent.     Pt will continue to benefit from skilled outpatient physical therapy to address the deficits listed in the problem list box on initial evaluation, provide pt/family education and to maximize pt's level of independence in the home and community environment.     Pt's spiritual, cultural and educational needs considered and pt agreeable to plan of care and goals.    Anticipated barriers to physical therapy: none    GOALS: Short Term Goals: 0-3 months  1.Report decreased R knee pain  < / =  1/10  to increase tolerance for amb  2. Increase knee ROM to 3-0-145 in order to be able to perform ADLs without difficulty.  3. Increase strength by 1/3 MMT grade in Quads to increase tolerance for ADL and work activities.  4. Able to amb w/o deviation or complaint  5. Pt to tolerate HEP to improve ROM and independence with ADL's     Long Term Goals: 3-9 months  1. Able to  return to running linearly   2. Able to complete [place] return to sport testing  2.Patient goal: Return to [employer]  3.Increase strength to >/= 4+/5 in Quad and hip musculature to increase tolerance for ADL and work activities.  4. Pt will report at CJ level (20-40% impaired) on FOTO knee to demonstrate increase in LE function with every day tasks.     Plan     See POC in treatment section for evaluation    YOLETTE MEDINA, PT, DPT, OCS

## 2022-12-07 ENCOUNTER — CLINICAL SUPPORT (OUTPATIENT)
Dept: REHABILITATION | Facility: HOSPITAL | Age: 47
End: 2022-12-07
Payer: COMMERCIAL

## 2022-12-07 DIAGNOSIS — M25.561 RIGHT KNEE PAIN, UNSPECIFIED CHRONICITY: Primary | ICD-10-CM

## 2022-12-07 DIAGNOSIS — M25.661 DECREASED ROM OF RIGHT KNEE: ICD-10-CM

## 2022-12-07 PROCEDURE — 97140 MANUAL THERAPY 1/> REGIONS: CPT

## 2022-12-07 PROCEDURE — 97112 NEUROMUSCULAR REEDUCATION: CPT

## 2022-12-07 PROCEDURE — 97110 THERAPEUTIC EXERCISES: CPT

## 2022-12-07 PROCEDURE — 97116 GAIT TRAINING THERAPY: CPT

## 2022-12-07 NOTE — PROGRESS NOTES
Physical Therapy Daily Treatment Note     Name: Thomas Taveras  Clinic Number: [id number]    Therapy Diagnosis: No diagnosis found.  Physician: Teodoro Marcus MD    Visit Date: 12/7/2022  Physician Orders: PT Eval and Treat   Medical Diagnosis from Referral: S83.511A (ICD-10-CM) - Complete tear of anterior cruciate ligament of right knee, initial encounter  Evaluation Date: 12/2/2022  Authorization Period Expiration: 12/01/2023  Plan of Care Expiration: 12/01/2023  Visit # / Visits authorized: 2/ 12     Time In: 1100  Time Out: 1215  Total Appointment Time (timed & untimed codes): 75 minutes      Precautions: Standard     Procedure:  Right anterior cruciate ligament reconstruction with bone patellar tendon bone autograft    Subjective     Pt reports: No complaints since last seen. Swelling and brace feel better.     He was compliant with home exercise program.  Response to previous treatment: HEP complaince  Functional change: no changes    Pain: 5/10  Location: right knee      Objective     Date of surgery: 12/1/2022  POD: 6     PROM 5-0-55 deg     Thomas received therapeutic exercises to develop strength, endurance, ROM, flexibility, posture, and core stabilization for 30 minutes including:     Patient education:  - tissue healing timelines  - swelling management  - importance of knee extension, quad activation, knee flexion ROM  - infection and DVT prevention  - review of HEP in detail    Heel prop x10 min: with Ankle pumps YTB x10  Heel slide 5 min  SLR 3x10 post NMES  Standing hip abduction 2x10 - brace on  Standing hip extension 2x10 - brace on     Thomas received the following manual therapy techniques: Joint mobilizations, Manual Lymphatic Drainage, and Soft tissue Mobilization were applied to the: R knee for 15 minutes, including:    Assessment  PROM knee flexion / extension  Quad stretch supine  MLD to R LE - NP  PFJ mobilizations grade I-II for swelling management - NP    Thomas participated in  neuromuscular re-education activities to improve: Coordination, Kinesthetic, Sense, Proprioception, and Posture for 20 minutes. The following activities were included:    NMES Biphasic 10:10 sec:  Quad set x12 min  Quad with with SLR x8 min    Thomas participated in gait training to improve functional mobility and safety for 10  minutes, including:    Step through gait mechanics with active quad set      Thomas received cold pack for 00 minutes to to decrease circulation, pain, and swelling.      Home Exercises Provided and Patient Education Provided     Education provided:   - see above    Written Home Exercises Provided: Patient instructed to cont prior HEP.  Exercises were reviewed and Thomas was able to demonstrate them prior to the end of the session.  Thomas demonstrated good  understanding of the education provided.     See EMR under Patient Instructions for exercises provided prior visit.    Assessment     Wayne all interventions well with good quad contraction and ability to perform SLR with mild lag upon completion.     Knee PROM into flexion limited due to guarding, quad tightness, and some swelling, but improved gait mechanics to help reduce guarding and improve anterior chain flexibility.     Needs cont focus on swelling management, LE ROM, and quad activation / strength.    Thomas Is progressing well towards his goals.   Pt prognosis is Excellent.     Pt will continue to benefit from skilled outpatient physical therapy to address the deficits listed in the problem list box on initial evaluation, provide pt/family education and to maximize pt's level of independence in the home and community environment.     Pt's spiritual, cultural and educational needs considered and pt agreeable to plan of care and goals.    Anticipated barriers to physical therapy: none    GOALS: Short Term Goals: 0-3 months  1.Report decreased R knee pain  < / =  1/10  to increase tolerance for amb  2. Increase knee ROM to 3-0-145 in  order to be able to perform ADLs without difficulty.  3. Increase strength by 1/3 MMT grade in Quads to increase tolerance for ADL and work activities.  4. Able to amb w/o deviation or complaint  5. Pt to tolerate HEP to improve ROM and independence with ADL's     Long Term Goals: 3-9 months  1. Able to return to running linearly   2. Able to complete [place] return to sport testing  2.Patient goal: [facility]  3.Increase strength to >/= 4+/5 in Quad and hip musculature to increase tolerance for ADL and work activities.  4. Pt will report at CJ level (20-40% impaired) on FOTO knee to demonstrate increase in LE function with every day tasks.     Plan     See POC in treatment section for evaluation    YOLETTE MEDINA, PT, DPT, OCS

## 2022-12-09 ENCOUNTER — CLINICAL SUPPORT (OUTPATIENT)
Dept: REHABILITATION | Facility: HOSPITAL | Age: 47
End: 2022-12-09
Payer: COMMERCIAL

## 2022-12-09 DIAGNOSIS — M25.561 RIGHT KNEE PAIN, UNSPECIFIED CHRONICITY: Primary | ICD-10-CM

## 2022-12-09 DIAGNOSIS — M25.661 DECREASED ROM OF RIGHT KNEE: ICD-10-CM

## 2022-12-09 PROCEDURE — 97112 NEUROMUSCULAR REEDUCATION: CPT

## 2022-12-09 PROCEDURE — 97116 GAIT TRAINING THERAPY: CPT

## 2022-12-09 PROCEDURE — 97110 THERAPEUTIC EXERCISES: CPT

## 2022-12-09 NOTE — PROGRESS NOTES
Physical Therapy Daily Treatment Note     Name: Thomas Taveras  Clinic Number: [id number]    Therapy Diagnosis:   Encounter Diagnoses   Name Primary?    Right knee pain, unspecified chronicity Yes    Decreased ROM of right knee      Physician: Teodoro Marcus MD    Visit Date: 12/9/2022  Physician Orders: PT Eval and Treat   Medical Diagnosis from Referral: S83.511A (ICD-10-CM) - Complete tear of anterior cruciate ligament of right knee, initial encounter  Evaluation Date: 12/2/2022  Authorization Period Expiration: 12/01/2023  Plan of Care Expiration: 12/01/2023  Visit # / Visits authorized: 3/ 12     Time In: 0900  Time Out: 1000  Total Appointment Time (timed & untimed codes): 60 minutes      Precautions: Standard     Procedure:  Right anterior cruciate ligament reconstruction with bone patellar tendon bone autograft    Subjective     Pt reports: Had brace on and twisted to grab something. Had a little pain, but since subsided. No other complaints. Cont HEP consistency.    He was compliant with home exercise program.  Response to previous treatment: HEP complaince  Functional change: no changes    Pain: 3 - 5/10  Location: right knee      Objective     Date of surgery: 12/1/2022  POD: 8     PROM 5-0-55 deg     Thomas received therapeutic exercises to develop strength, endurance, ROM, flexibility, posture, and core stabilization for 30 minutes including:     Patient education:  - swelling management  - importance of knee extension, quad activation, knee flexion ROM  - brace unlocked for ambulation and at rest over weekend.  - brace locked in bed    Prone hang with half FR x10 min  Quad stretch prone - NOT TANYA  Calf stretch strap 20x10 sec  Marching in place (brace unlocked) x10 ea  Bike half circles x10 min for ROM and swelling management    Held:  Heel prop x10 min: with Ankle pumps YTB x10  Heel slide 5 min  SLR 3x10 post NMES  Standing hip abduction 2x10 - brace on  Standing hip extension 2x10 - brace on      Thomas received the following manual therapy techniques: Joint mobilizations, Manual Lymphatic Drainage, and Soft tissue Mobilization were applied to the: R knee for 00 minutes, including:    Held:  Assessment  PROM knee flexion / extension  Quad stretch supine  MLD to R LE - NP  PFJ mobilizations grade I-II for swelling management - NP    Thomas participated in neuromuscular re-education activities to improve: Coordination, Kinesthetic, Sense, Proprioception, and Posture for 20 minutes. The following activities were included:    NMES Biphasic 10:10 sec:  Quad set x12 min  Quad with with SLR x8 min    Thomas participated in gait training to improve functional mobility and safety for 10  minutes, including:    Brace Unlocked Gait:  Step through gait mechanics with active quad set    Thomas received cold pack for 10 minutes to to decrease circulation, pain, and swelling.      Home Exercises Provided and Patient Education Provided     Education provided:   - see above    Written Home Exercises Provided: Patient instructed to cont prior HEP.  Exercises were reviewed and Thomas was able to demonstrate them prior to the end of the session.  Thomas demonstrated good  understanding of the education provided.     See EMR under Patient Instructions for exercises provided prior visit.    Assessment     No lingering effects of irritation when twisting. Demonstrates good quad activation and contraction along with excellent extension ROM, but flexion remains limited.     Secondary to limitation in flexion and quad control plan to trial brace unlocked to 90 deg this weekend. Advised to lock brace at night and if irritation / swelling occurs.     Will reassess jina to brace unlocked on Tuesday.    Needs cont focus on swelling management, LE ROM, and quad activation / strength.    Thomas Is progressing well towards his goals.   Pt prognosis is Excellent.     Pt will continue to benefit from skilled outpatient physical therapy to address  the deficits listed in the problem list box on initial evaluation, provide pt/family education and to maximize pt's level of independence in the home and community environment.     Pt's spiritual, cultural and educational needs considered and pt agreeable to plan of care and goals.    Anticipated barriers to physical therapy: none    GOALS: Short Term Goals: 0-3 months  1.Report decreased R knee pain  < / =  1/10  to increase tolerance for amb  2. Increase knee ROM to 3-0-145 in order to be able to perform ADLs without difficulty.  3. Increase strength by 1/3 MMT grade in Quads to increase tolerance for ADL and work activities.  4. Able to amb w/o deviation or complaint  5. Pt to tolerate HEP to improve ROM and independence with ADL's     Long Term Goals: 3-9 months  1. Able to return to running linearly   2. Able to complete [place] return to sport testing  2.Patient goal: Return to [employer]  3.Increase strength to >/= 4+/5 in Quad and hip musculature to increase tolerance for ADL and work activities.  4. Pt will report at CJ level (20-40% impaired) on FOTO knee to demonstrate increase in LE function with every day tasks.     Plan     See POC in treatment section for evaluation    YOLETTE MEDINA, PT, DPT, OCS

## 2022-12-13 ENCOUNTER — CLINICAL SUPPORT (OUTPATIENT)
Dept: REHABILITATION | Facility: HOSPITAL | Age: 47
End: 2022-12-13
Payer: COMMERCIAL

## 2022-12-13 DIAGNOSIS — M25.561 RIGHT KNEE PAIN, UNSPECIFIED CHRONICITY: Primary | ICD-10-CM

## 2022-12-13 DIAGNOSIS — M25.661 DECREASED ROM OF RIGHT KNEE: ICD-10-CM

## 2022-12-13 PROCEDURE — 97110 THERAPEUTIC EXERCISES: CPT

## 2022-12-13 PROCEDURE — 97140 MANUAL THERAPY 1/> REGIONS: CPT

## 2022-12-13 NOTE — PROGRESS NOTES
Physical Therapy Daily Treatment Note     Name: Thomas Taveras  Clinic Number: [id number]    Therapy Diagnosis:   No diagnosis found.    Physician: Teodoro Marcus MD    Visit Date: 12/13/2022  Physician Orders: PT Eval and Treat   Medical Diagnosis from Referral: S83.511A (ICD-10-CM) - Complete tear of anterior cruciate ligament of right knee, initial encounter  Evaluation Date: 12/2/2022  Authorization Period Expiration: 12/01/2023  Plan of Care Expiration: 12/01/2023  Visit # / Visits authorized: 4/ 12     Time In: 1000  Time Out: 1130   Total Appointment Time (timed & untimed codes): 90 minutes - 10 min ice     Precautions: Standard     Procedure:  Right anterior cruciate ligament reconstruction with bone patellar tendon bone autograft    Subjective     Pt reports: No complaints from brace unlocked over the weekend. Cont to to HEP. FU with MD is on Friday.    He was compliant with home exercise program.  Response to previous treatment: HEP complaince  Functional change: no changes    Pain: 3/10  Location: right knee      Objective     Date of surgery: 12/1/2022  POD: 12    PROM 2-0-65 deg at start of visit  PROM 2-0-80 deg at end of visit      Thomas received therapeutic exercises to develop strength, endurance, ROM, flexibility, posture, and core stabilization for 50 minutes including:     Patient education:  - swelling management  - importance of knee extension, quad activation, knee flexion ROM  - brace unlocked for ambulation and at rest over weekend.    Marching in place (brace unlocked) 3x10 ea  Standing hip abduction 2x10 - brace on  Standing hip extension 2x10 - brace on  Bike half circles x10 min for ROM and swelling management  Heel slide 3x5 min  Quad stretch supine 5x45 sec  EOT quad stretch upright 5x45 sec  Calf stretch strap 5x30 sec - pre and post manual  ASLR 3x10       NEXT VISIT - Prone hang, bike, heel slides, edge of table supine quad stretch      Held:  Heel prop x10 min: with Ankle pumps  YTB x10  Prone hang with half FR x10 min  Quad stretch prone - NOT TANYA       Thomas received the following manual therapy techniques: Joint mobilizations, Manual Lymphatic Drainage, and Soft tissue Mobilization were applied to the: R knee for 30 minutes, including:    Assessment  PROM knee flexion / extension  Quad stretch supine  IASTM to quad, around incision sites, ITB, fascial insertions  PFJ mobilizations grade I-II   AP tibia on femur Grade II-III    Thomas participated in neuromuscular re-education activities to improve: Coordination, Kinesthetic, Sense, Proprioception, and Posture for 00 minutes. The following activities were included:    NMES Biphasic 10:10 sec:  Quad set x12 min  Quad with with SLR x8 min    Thomas participated in gait training to improve functional mobility and safety for 00  minutes, including:    Brace Unlocked Gait:  Step through gait mechanics with active quad set    Thomas received cold pack for 10 minutes to to decrease circulation, pain, and swelling.      Home Exercises Provided and Patient Education Provided     Education provided:   - see above    Written Home Exercises Provided: Patient instructed to cont prior HEP.  Exercises were reviewed and Thomas was able to demonstrate them prior to the end of the session.  Thomas demonstrated good  understanding of the education provided.     See EMR under Patient Instructions for exercises provided prior visit.    Assessment     Patient nearing 2 week post op brenda. Demonstrates good quad function and tolerating brace unlocked with bilateral crutches at current time. Extension ROM near WNL, though tighter than at last visit secondary to brace unlocked. Improved flexion ROM since last seen with good improvement post manual and therex interventions. Goal of 90 deg by 2 week follow up with MD.    Needs cont focus on swelling management, LE ROM, and quad activation / strength.    Thomas Is progressing well towards his goals.   Pt prognosis is  Excellent.     Pt will continue to benefit from skilled outpatient physical therapy to address the deficits listed in the problem list box on initial evaluation, provide pt/family education and to maximize pt's level of independence in the home and community environment.     Pt's spiritual, cultural and educational needs considered and pt agreeable to plan of care and goals.    Anticipated barriers to physical therapy: none    GOALS: Short Term Goals: 0-3 months  1.Report decreased R knee pain  < / =  1/10  to increase tolerance for amb  2. Increase knee ROM to 3-0-145 in order to be able to perform ADLs without difficulty.  3. Increase strength by 1/3 MMT grade in Quads to increase tolerance for ADL and work activities.  4. Able to amb w/o deviation or complaint  5. Pt to tolerate HEP to improve ROM and independence with ADL's     Long Term Goals: 3-9 months  1. Able to return to running linearly   2. Able to complete [place] return to sport testing  2.Patient goal: Return to [facility]  3.Increase strength to >/= 4+/5 in Quad and hip musculature to increase tolerance for ADL and work activities.  4. Pt will report at CJ level (20-40% impaired) on FOTO knee to demonstrate increase in LE function with every day tasks.     Plan     See POC in treatment section for evaluation    YOLETTE MEDINA, PT, DPT, OCS

## 2022-12-14 ENCOUNTER — CLINICAL SUPPORT (OUTPATIENT)
Dept: REHABILITATION | Facility: HOSPITAL | Age: 47
End: 2022-12-14
Payer: COMMERCIAL

## 2022-12-14 DIAGNOSIS — M25.561 ACUTE PAIN OF RIGHT KNEE: Primary | ICD-10-CM

## 2022-12-14 DIAGNOSIS — M25.661 DECREASED ROM OF RIGHT KNEE: ICD-10-CM

## 2022-12-14 PROCEDURE — 97140 MANUAL THERAPY 1/> REGIONS: CPT | Mod: CQ

## 2022-12-14 PROCEDURE — 97110 THERAPEUTIC EXERCISES: CPT | Mod: CQ

## 2022-12-14 NOTE — PROGRESS NOTES
Physical Therapy Daily Treatment Note     Name: Thomas Taveras  Clinic Number: [id number]    Therapy Diagnosis:   Encounter Diagnoses   Name Primary?    Acute pain of right knee Yes    Decreased ROM of right knee        Physician: Teodoro Marcus MD    Visit Date: 12/14/2022  Physician Orders: PT Eval and Treat   Medical Diagnosis from Referral: S83.511A (ICD-10-CM) - Complete tear of anterior cruciate ligament of right knee, initial encounter  Evaluation Date: 12/2/2022  Authorization Period Expiration: 12/01/2023  Plan of Care Expiration: 12/01/2023  Visit # / Visits authorized: 5/ 12     Time In: 1100  Time Out: 1200  Total Appointment Time (timed & untimed codes):     Precautions: Standard     Procedure:  Right anterior cruciate ligament reconstruction with bone patellar tendon bone autograft    Subjective     Pt reports: minimal pain this morning.    He was compliant with home exercise program.  Response to previous treatment: HEP complaince  Functional change: ambulating with crutches and brace unlocked.     Pain: 3/10  Location: right knee      Objective     Date of surgery: 12/14/2022  POD: 12    PROM 2-0-65 deg at start of visit NT   PROM 2-0-85 deg at end of visit      Thomas received therapeutic exercises to develop strength, endurance, ROM, flexibility, posture, and core stabilization for 40 minutes including:     Patient education:  - swelling management  - importance of knee extension, quad activation, knee flexion ROM  - brace unlocked for ambulation and at rest over weekend.    Marching in place (brace unlocked) 3x10 ea  Standing hip abduction 2x15- brace on  Standing hip extension 2x15 - brace on  Standing heel raises 15x -brace on   Bike half circles x10 min for ROM and swelling management  Heel slide 3x5 min  Quad stretch supine 5x45 sec    Not today   EOT quad stretch upright 5x45 sec  Calf stretch strap 5x30 sec - pre and post manual  ASLR 3x10       NEXT VISIT - Prone hang, bike, heel slides,  edge of table supine quad stretch      Held:  Heel prop x10 min: with Ankle pumps YTB x10  Prone hang with half FR x10 min  Quad stretch prone - NOT TANYA       Thomas received the following manual therapy techniques: Joint mobilizations, Manual Lymphatic Drainage, and Soft tissue Mobilization were applied to the: R knee for 10 minutes, including:  PROM knee flexion/extension  Quad stretch supine EOT   STM to quad, around incision sites,      Thomas participated in neuromuscular re-education activities to improve: Coordination, Kinesthetic, Sense, Proprioception, and Posture for 00 minutes. The following activities were included:    NMES Biphasic 5:5 sec:  Quad with with SLR x 10 min on RICE     Thomas participated in gait training to improve functional mobility and safety for 00  minutes, including:    Brace Unlocked Gait:  Step through gait mechanics with active quad set    Thomas received cold pack for 10 minutes to to decrease circulation, pain, and swelling performing SLR with Estim see above      Home Exercises Provided and Patient Education Provided     Education provided:   - see above    Written Home Exercises Provided: Patient instructed to cont prior HEP.  Exercises were reviewed and Thomas was able to demonstrate them prior to the end of the session.  Thomas demonstrated good  understanding of the education provided.     See EMR under Patient Instructions for exercises provided prior visit.    Assessment   Pt tolerating tx well. Improved quad activation note and progressing well with protocol. VC/TC for correcting form/technique b. Goal of 90 deg by 2 week follow up with MD.    Needs cont focus on swelling management, LE ROM, and quad activation / strength.    Thomas Is progressing well towards his goals.   Pt prognosis is Excellent.     Pt will continue to benefit from skilled outpatient physical therapy to address the deficits listed in the problem list box on initial evaluation, provide pt/family education and  to maximize pt's level of independence in the home and community environment.     Pt's spiritual, cultural and educational needs considered and pt agreeable to plan of care and goals.    Anticipated barriers to physical therapy: none    GOALS: Short Term Goals: 0-3 months  1.Report decreased R knee pain  < / =  1/10  to increase tolerance for amb  2. Increase knee ROM to 3-0-145 in order to be able to perform ADLs without difficulty.  3. Increase strength by 1/3 MMT grade in Quads to increase tolerance for ADL and work activities.  4. Able to amb w/o deviation or complaint  5. Pt to tolerate HEP to improve ROM and independence with ADL's     Long Term Goals: 3-9 months  1. Able to return to running linearly   2. Able to complete [place] return to sport testing  2.[facility]  3.Increase strength to >/= 4+/5 in Quad and hip musculature to increase tolerance for ADL and work activities.  4. Pt will report at CJ level (20-40% impaired) on FOTO knee to demonstrate increase in LE function with every day tasks.     Plan     See POC in treatment section for evaluation    Dominick Hurley, PTA, STS

## 2022-12-16 ENCOUNTER — CLINICAL SUPPORT (OUTPATIENT)
Dept: REHABILITATION | Facility: HOSPITAL | Age: 47
End: 2022-12-16
Payer: COMMERCIAL

## 2022-12-16 ENCOUNTER — OFFICE VISIT (OUTPATIENT)
Dept: ORTHOPEDICS | Facility: CLINIC | Age: 47
End: 2022-12-16
Payer: COMMERCIAL

## 2022-12-16 VITALS
HEIGHT: 68 IN | WEIGHT: 160.06 LBS | SYSTOLIC BLOOD PRESSURE: 122 MMHG | DIASTOLIC BLOOD PRESSURE: 79 MMHG | BODY MASS INDEX: 24.26 KG/M2 | HEART RATE: 76 BPM

## 2022-12-16 DIAGNOSIS — M25.561 RIGHT KNEE PAIN, UNSPECIFIED CHRONICITY: Primary | ICD-10-CM

## 2022-12-16 DIAGNOSIS — M25.661 DECREASED ROM OF RIGHT KNEE: ICD-10-CM

## 2022-12-16 DIAGNOSIS — Z98.890 S/P ACL RECONSTRUCTION: Primary | ICD-10-CM

## 2022-12-16 PROCEDURE — 3008F PR BODY MASS INDEX (BMI) DOCUMENTED: ICD-10-PCS | Mod: CPTII,S$GLB,, | Performed by: ORTHOPAEDIC SURGERY

## 2022-12-16 PROCEDURE — 1160F PR REVIEW ALL MEDS BY PRESCRIBER/CLIN PHARMACIST DOCUMENTED: ICD-10-PCS | Mod: CPTII,S$GLB,, | Performed by: ORTHOPAEDIC SURGERY

## 2022-12-16 PROCEDURE — 99024 PR POST-OP FOLLOW-UP VISIT: ICD-10-PCS | Mod: S$GLB,,, | Performed by: ORTHOPAEDIC SURGERY

## 2022-12-16 PROCEDURE — 1160F RVW MEDS BY RX/DR IN RCRD: CPT | Mod: CPTII,S$GLB,, | Performed by: ORTHOPAEDIC SURGERY

## 2022-12-16 PROCEDURE — 97140 MANUAL THERAPY 1/> REGIONS: CPT

## 2022-12-16 PROCEDURE — 1159F PR MEDICATION LIST DOCUMENTED IN MEDICAL RECORD: ICD-10-PCS | Mod: CPTII,S$GLB,, | Performed by: ORTHOPAEDIC SURGERY

## 2022-12-16 PROCEDURE — 99999 PR PBB SHADOW E&M-EST. PATIENT-LVL III: CPT | Mod: PBBFAC,,, | Performed by: ORTHOPAEDIC SURGERY

## 2022-12-16 PROCEDURE — 3074F SYST BP LT 130 MM HG: CPT | Mod: CPTII,S$GLB,, | Performed by: ORTHOPAEDIC SURGERY

## 2022-12-16 PROCEDURE — 3078F PR MOST RECENT DIASTOLIC BLOOD PRESSURE < 80 MM HG: ICD-10-PCS | Mod: CPTII,S$GLB,, | Performed by: ORTHOPAEDIC SURGERY

## 2022-12-16 PROCEDURE — 99024 POSTOP FOLLOW-UP VISIT: CPT | Mod: S$GLB,,, | Performed by: ORTHOPAEDIC SURGERY

## 2022-12-16 PROCEDURE — 1159F MED LIST DOCD IN RCRD: CPT | Mod: CPTII,S$GLB,, | Performed by: ORTHOPAEDIC SURGERY

## 2022-12-16 PROCEDURE — 3078F DIAST BP <80 MM HG: CPT | Mod: CPTII,S$GLB,, | Performed by: ORTHOPAEDIC SURGERY

## 2022-12-16 PROCEDURE — 97116 GAIT TRAINING THERAPY: CPT

## 2022-12-16 PROCEDURE — 99999 PR PBB SHADOW E&M-EST. PATIENT-LVL III: ICD-10-PCS | Mod: PBBFAC,,, | Performed by: ORTHOPAEDIC SURGERY

## 2022-12-16 PROCEDURE — 3074F PR MOST RECENT SYSTOLIC BLOOD PRESSURE < 130 MM HG: ICD-10-PCS | Mod: CPTII,S$GLB,, | Performed by: ORTHOPAEDIC SURGERY

## 2022-12-16 PROCEDURE — 97110 THERAPEUTIC EXERCISES: CPT

## 2022-12-16 PROCEDURE — 3008F BODY MASS INDEX DOCD: CPT | Mod: CPTII,S$GLB,, | Performed by: ORTHOPAEDIC SURGERY

## 2022-12-16 NOTE — PROGRESS NOTES
Physical Therapy Daily Treatment Note     Name: Thomas Taveras  Clinic Number: [id number]    Therapy Diagnosis:   No diagnosis found.      Physician: Teodoro Marcus MD    Visit Date: 12/16/2022  Physician Orders: PT Eval and Treat   Medical Diagnosis from Referral: S83.511A (ICD-10-CM) - Complete tear of anterior cruciate ligament of right knee, initial encounter  Evaluation Date: 12/2/2022  Authorization Period Expiration: 12/01/2023  Plan of Care Expiration: 12/01/2023  Visit # / Visits authorized: 6/ 12     Time In: 1250  Time Out: 1400  Total Appointment Time (timed & untimed codes): 70 min - 10 min ice     Precautions: Standard     Procedure:  Right anterior cruciate ligament reconstruction with bone patellar tendon bone autograft    Subjective     Pt reports: FU with MD went well. Advised he can start driving in about a week. Consistent with HEP.    He was compliant with home exercise program.  Response to previous treatment: HEP complaince  Functional change: ambulating with crutches and brace unlocked.     Pain: 3/10  Location: right knee      Objective     Date of surgery: 12/01/2022  POD: 15    PROM 2-0-77 deg at start of visit   PROM 2-0-92 deg at end of visit      Thomas received therapeutic exercises to develop strength, endurance, ROM, flexibility, posture, and core stabilization for 30 minutes including:     Patient education:  - swelling management  - importance of knee extension, quad activation, knee flexion ROM  - brace unlocked for ambulation   - x1 crutch over weekend    Heel slide supine x10 min  Bike x10 min full [place] for ROM and swelling management  Shuttle press DL 25# 2x10  Shuttle press SL 25# 2x10      Not today:   Marching in place (brace unlocked) 3x10 ea  Standing hip abduction 2x15- brace on  Standing hip extension 2x15 - brace on  Standing heel raises 15x -brace on  Quad stretch supine 5x45 sec  EOT quad stretch upright 5x45 sec  Calf stretch strap 5x30 sec - pre and post  manual  ASLR 3x10       NEXT VISIT - Bike / Quad       Held:  Heel prop x10 min: with Ankle pumps YTB x10  Prone hang with half FR x10 min  Quad stretch prone - NOT WAYNE       Thomas received the following manual therapy techniques: Joint mobilizations, Manual Lymphatic Drainage, and Soft tissue Mobilization were applied to the: R knee for 15 minutes, including:    PROM knee flexion/extension  IASTM to quad, around incision sites,  AP mobilization Grade III-IV  PFJ superior / inferior mobilization Grade II-III      Held:  Quad stretch supine EOT     Thomas participated in neuromuscular re-education activities to improve: Coordination, Kinesthetic, Sense, Proprioception, and Posture for 00 minutes. The following activities were included:    NMES Biphasic 5:5 sec:  Quad with with SLR x 10 min on RICE     Thomas participated in gait training to improve functional mobility and safety for 15  minutes, including:    x1 crutch Gait:  Step through gait mechanics with active quad set  [place] walking fwd matching x2 laps  Vineet walking fwd reciprocal x2 laps  Vineet walking lateral x2 laps    Thomas received cold pack for 10 minutes to to decrease circulation, pain, and swelling.      Home Exercises Provided and Patient Education Provided     Education provided:   - see above    Written Home Exercises Provided: Patient instructed to cont prior HEP.  Exercises were reviewed and Thomas was able to demonstrate them prior to the end of the session.  Thomas demonstrated good  understanding of the education provided.     See EMR under Patient Instructions for exercises provided prior visit.    Assessment     Wayne all interventions well.     Improving flexion ROM over time with patient able to achieve > 90 deg and able to perform full revolution on bike w/ slight hip hike. Also able to wayne gait with x1 crutch, but needed reminders for active quad loading during gait.    Needs cont focus on swelling management, LE ROM, and quad activation /  strength.    Thomas Is progressing well towards his goals.   Pt prognosis is Excellent.     Pt will continue to benefit from skilled outpatient physical therapy to address the deficits listed in the problem list box on initial evaluation, provide pt/family education and to maximize pt's level of independence in the home and community environment.     Pt's spiritual, cultural and educational needs considered and pt agreeable to plan of care and goals.    Anticipated barriers to physical therapy: none    GOALS: Short Term Goals: 0-3 months  1.Report decreased R knee pain  < / =  1/10  to increase tolerance for amb  2. Increase knee ROM to 3-0-145 in order to be able to perform ADLs without difficulty.  3. Increase strength by 1/3 MMT grade in Quads to increase tolerance for ADL and work activities.  4. Able to amb w/o deviation or complaint  5. Pt to tolerate HEP to improve ROM and independence with ADL's     Long Term Goals: 3-9 months  1. Able to return to running linearly   2. Able to complete [place] return to sport testing  2.Patient goal: Return to [facility]  3.Increase strength to >/= 4+/5 in Quad and hip musculature to increase tolerance for ADL and work activities.  4. Pt will report at CJ level (20-40% impaired) on FOTO knee to demonstrate increase in LE function with every day tasks.     Plan     See POC in treatment section for evaluation    YOLETTE MEDINA, PT, DPT, OCS

## 2022-12-20 ENCOUNTER — CLINICAL SUPPORT (OUTPATIENT)
Dept: REHABILITATION | Facility: HOSPITAL | Age: 47
End: 2022-12-20
Payer: COMMERCIAL

## 2022-12-20 DIAGNOSIS — M25.561 RIGHT KNEE PAIN, UNSPECIFIED CHRONICITY: Primary | ICD-10-CM

## 2022-12-20 DIAGNOSIS — M25.661 DECREASED ROM OF RIGHT KNEE: ICD-10-CM

## 2022-12-20 PROCEDURE — 97110 THERAPEUTIC EXERCISES: CPT

## 2022-12-20 PROCEDURE — 97140 MANUAL THERAPY 1/> REGIONS: CPT

## 2022-12-20 NOTE — PROGRESS NOTES
Physical Therapy Daily Treatment Note     Name: Thomas Taveras  Clinic Number: [id number]    Therapy Diagnosis:   No diagnosis found.      Physician: Teodoro Marcus MD    Visit Date: 12/20/2022  Physician Orders: PT Eval and Treat   Medical Diagnosis from Referral: S83.511A (ICD-10-CM) - Complete tear of anterior cruciate ligament of right knee, initial encounter  Evaluation Date: 12/2/2022  Authorization Period Expiration: 12/01/2023  Plan of Care Expiration: 12/01/2023  Visit # / Visits authorized: 7/ 12     Time In: 0900  Time Out: 1000  Total Appointment Time (timed & untimed codes): 60 min      Precautions: Standard     Procedure:  Right anterior cruciate ligament reconstruction with bone patellar tendon bone autograft    Subjective     Pt reports: No complaints. Practiced getting in and out of car / reversing without complaint.    He was compliant with home exercise program.  Response to previous treatment: HEP complaince  Functional change: ambulating with crutches and brace unlocked.     Pain: 3/10  Location: right knee      Objective     Date of surgery: 12/01/2022  POD: 19    PROM 2-0-92 deg at start of visit   PROM 2-0-100 deg at end of visit      Thomas received therapeutic exercises to develop strength, endurance, ROM, flexibility, posture, and core stabilization for 50 minutes including:     Patient education:  - swelling management  - importance of knee extension, quad activation, knee flexion ROM  - brace unlocked for ambulation   - x1 crutch     Heel slide supine x10 min  Bike x12 min Lvl 3 full [place] for ROM and swelling management  Quad set 10x fail  ASLR 3x10   Knee extension EOT 3x10  Shuttle press DL 75# 3x10  Shuttle press SL 32.5# 3x10      Not today:   Marching in place (brace unlocked) 3x10 ea  Standing hip abduction 2x15- brace on  Standing hip extension 2x15 - brace on  Standing heel raises 15x -brace on  Quad stretch supine 5x45 sec  EOT quad stretch upright 5x45 sec  Calf stretch  strap 5x30 sec - pre and post manual  ASLR 3x10       NEXT VISIT - Bike / Quad       Held:  Heel prop x10 min: with Ankle pumps YTB x10  Prone hang with half FR x10 min  Quad stretch prone - NOT WAYNE       Thomas received the following manual therapy techniques: Joint mobilizations, Manual Lymphatic Drainage, and Soft tissue Mobilization were applied to the: R knee for 10 minutes, including:    PROM knee flexion/extension  IASTM to quad, around incision sites - NT  AP mobilization Grade III-IV  PFJ superior / inferior mobilization Grade II-III    Held:  Quad stretch supine EOT     Thomas participated in neuromuscular re-education activities to improve: Coordination, Kinesthetic, Sense, Proprioception, and Posture for 00 minutes. The following activities were included:    NMES Biphasic 5:5 sec:  Quad with with SLR x 10 min on RICE     Thomas received cold pack for 00 minutes to to decrease circulation, pain, and swelling.      Home Exercises Provided and Patient Education Provided     Education provided:   - see above    Written Home Exercises Provided: Patient instructed to cont prior HEP.  Exercises were reviewed and Thomas was able to demonstrate them prior to the end of the session.  Thomas demonstrated good  understanding of the education provided.     See EMR under Patient Instructions for exercises provided prior visit.    Assessment     Wayne all interventions with demonstration of improving ROM. Able to perform full revolutions w/o hip hike.     Good challenge to quad activation and strength within visit as well with progressions.     Needs cont focus on swelling management, LE ROM, and quad activation / strength.    Thomas Is progressing well towards his goals.   Pt prognosis is Excellent.     Pt will continue to benefit from skilled outpatient physical therapy to address the deficits listed in the problem list box on initial evaluation, provide pt/family education and to maximize pt's level of independence in the  home and community environment.     Pt's spiritual, cultural and educational needs considered and pt agreeable to plan of care and goals.    Anticipated barriers to physical therapy: none    GOALS: Short Term Goals: 0-3 months  1.Report decreased R knee pain  < / =  1/10  to increase tolerance for amb  2. Increase knee ROM to 3-0-145 in order to be able to perform ADLs without difficulty.  3. Increase strength by 1/3 MMT grade in Quads to increase tolerance for ADL and work activities.  4. Able to amb w/o deviation or complaint  5. Pt to tolerate HEP to improve ROM and independence with ADL's     Long Term Goals: 3-9 months  1. Able to return to running linearly   2. Able to complete [place] return to sport testing  2.Patient goal: [facility] [employer] [facility]  3.Increase strength to >/= 4+/5 in Quad and hip musculature to increase tolerance for ADL and work activities.  4. Pt will report at CJ level (20-40% impaired) on FOTO knee to demonstrate increase in LE function with every day tasks.     Plan     See POC in treatment section for evaluation    YOLETTE MEDINA, PT, DPT, OCS

## 2022-12-21 ENCOUNTER — CLINICAL SUPPORT (OUTPATIENT)
Dept: REHABILITATION | Facility: HOSPITAL | Age: 47
End: 2022-12-21
Payer: COMMERCIAL

## 2022-12-21 DIAGNOSIS — M25.661 DECREASED ROM OF RIGHT KNEE: ICD-10-CM

## 2022-12-21 DIAGNOSIS — M25.561 RIGHT KNEE PAIN, UNSPECIFIED CHRONICITY: Primary | ICD-10-CM

## 2022-12-21 PROCEDURE — 97110 THERAPEUTIC EXERCISES: CPT

## 2022-12-21 PROCEDURE — 97140 MANUAL THERAPY 1/> REGIONS: CPT

## 2022-12-21 NOTE — PROGRESS NOTES
Physical Therapy Daily Treatment Note     Name: Thomas Taveras  Clinic Number: [id number]    Therapy Diagnosis:   Encounter Diagnoses   Name Primary?    Right knee pain, unspecified chronicity Yes    Decreased ROM of right knee        Physician: Teodoro Marcus MD    Visit Date: 12/21/2022  Physician Orders: PT Eval and Treat   Medical Diagnosis from Referral: S83.511A (ICD-10-CM) - Complete tear of anterior cruciate ligament of right knee, initial encounter  Evaluation Date: 12/2/2022  Authorization Period Expiration: 12/01/2023  Plan of Care Expiration: 12/01/2023  Visit # / Visits authorized: 8/ 12     Time In: 1240  Time Out: 1400  Total Appointment Time (timed & untimed codes): 80 min - 10 min ice      Precautions: Standard     Procedure:  Right anterior cruciate ligament reconstruction with bone patellar tendon bone autograft    Subjective     Pt reports: No complaints. Compliant with HEP.    He was compliant with home exercise program.  Response to previous treatment: HEP complaince  Functional change: ambulating with x1 crutches and brace unlocked.     Pain: 3/10  Location: right knee      Objective     Date of surgery: 12/01/2022  POD: 20    PROM 2-0-95 deg at start of visit   PROM 2-0-105 deg at end of visit      Thomas received therapeutic exercises to develop strength, endurance, ROM, flexibility, posture, and core stabilization for 55 minutes including:     Patient education:  - swelling management  - importance of knee extension, quad activation, knee flexion ROM  - brace unlocked for ambulation   - x1 crutch     Bike x10 min Lvl 3 full [place] for ROM and swelling management    BFR 80% 30:15:15:15 for all:  Quad set  SLR supine  Bridge    Heel slide supine 2x4 min - pre / post manual  Prone quad stretch 2x5x30 sec - pre / post manual      NEXT VISIT - Bike / Quad       Held:  Shuttle press DL 75# 3x10  Shuttle press SL 32.5# 3x10  Quad set 10x fail  ASLR 3x10   Knee extension EOT 3x10  Marching in  place (brace unlocked) 3x10 ea  Standing hip abduction 2x15- brace on  Standing hip extension 2x15 - brace on  Standing heel raises 15x -brace on  Quad stretch supine 5x45 sec  EOT quad stretch upright 5x45 sec  Calf stretch strap 5x30 sec - pre and post manual  ASLR 3x10        Thomas received the following manual therapy techniques: Joint mobilizations, Manual Lymphatic Drainage, and Soft tissue Mobilization were applied to the: R knee for 15 minutes, including:    PROM knee flexion/extension  IASTM to quad, around incision sites   AP mobilization Grade III-IV  PFJ superior / inferior mobilization Grade II-III    Held:  Quad stretch supine EOT     Thomas participated in neuromuscular re-education activities to improve: Coordination, Kinesthetic, Sense, Proprioception, and Posture for 00 minutes. The following activities were included:    NMES Biphasic 5:5 sec:  Quad with with SLR x 10 min on RICE       Thomas received cold pack for 10 minutes to to decrease circulation, pain, and swelling.      Home Exercises Provided and Patient Education Provided     Education provided:   - see above    Written Home Exercises Provided: Patient instructed to cont prior HEP.  Exercises were reviewed and Thomas was able to demonstrate them prior to the end of the session.  Thomas demonstrated good  understanding of the education provided.     See EMR under Patient Instructions for exercises provided prior visit.    Assessment     Wayne all interventions well again with demonstration of slowly improving ROM. ROM seems to be limited by post op swelling so re-advised in swelling management. Added BFR to exercises today as well to improve swelling and to challenge strength. Wayne well within visit.     Needs cont focus on swelling management, LE ROM, and quad activation / strength.    Thomas Is progressing well towards his goals.   Pt prognosis is Excellent.     Pt will continue to benefit from skilled outpatient physical therapy to address the  deficits listed in the problem list box on initial evaluation, provide pt/family education and to maximize pt's level of independence in the home and community environment.     Pt's spiritual, cultural and educational needs considered and pt agreeable to plan of care and goals.    Anticipated barriers to physical therapy: none    GOALS: Short Term Goals: 0-3 months  1.Report decreased R knee pain  < / =  1/10  to increase tolerance for amb  2. Increase knee ROM to 3-0-145 in order to be able to perform ADLs without difficulty.  3. Increase strength by 1/3 MMT grade in Quads to increase tolerance for ADL and work activities.  4. Able to amb w/o deviation or complaint  5. Pt to tolerate HEP to improve ROM and independence with ADL's     Long Term Goals: 3-9 months  1. Able to return to running linearly   2. Able to complete [place] return to sport testing  2.Patient goal: Return to [employer]  3.Increase strength to >/= 4+/5 in Quad and hip musculature to increase tolerance for ADL and work activities.  4. Pt will report at CJ level (20-40% impaired) on FOTO knee to demonstrate increase in LE function with every day tasks.     Plan     See POC in treatment section for evaluation    YOLETTE MEDINA, PT, DPT, OCS

## 2022-12-23 ENCOUNTER — CLINICAL SUPPORT (OUTPATIENT)
Dept: REHABILITATION | Facility: HOSPITAL | Age: 47
End: 2022-12-23
Payer: COMMERCIAL

## 2022-12-23 DIAGNOSIS — M25.561 RIGHT KNEE PAIN, UNSPECIFIED CHRONICITY: Primary | ICD-10-CM

## 2022-12-23 DIAGNOSIS — M25.661 DECREASED ROM OF RIGHT KNEE: ICD-10-CM

## 2022-12-23 PROCEDURE — 97110 THERAPEUTIC EXERCISES: CPT

## 2022-12-23 PROCEDURE — 97112 NEUROMUSCULAR REEDUCATION: CPT

## 2022-12-23 NOTE — PROGRESS NOTES
Physical Therapy Daily Treatment Note     Name: Thomas Taveras  Clinic Number: [id number]    Therapy Diagnosis:   Encounter Diagnoses   Name Primary?    Right knee pain, unspecified chronicity Yes    Decreased ROM of right knee        Physician: Teodoro Marcus MD    Visit Date: 12/23/2022  Physician Orders: PT Eval and Treat   Medical Diagnosis from Referral: S83.511A (ICD-10-CM) - Complete tear of anterior cruciate ligament of right knee, initial encounter  Evaluation Date: 12/2/2022  Authorization Period Expiration: 12/01/2023  Plan of Care Expiration: 12/01/2023  Visit # / Visits authorized: 9/ 12     Time In: 1040  Time Out: 1200  Total Appointment Time (timed & untimed codes): 80 min - 10 min ice      Precautions: Standard     Procedure:  Right anterior cruciate ligament reconstruction with bone patellar tendon bone autograft    Subjective     Pt reports: Consistent with HEP.    He was compliant with home exercise program.  Response to previous treatment: HEP complaince  Functional change: ambulating with x1 crutches and brace unlocked.     Pain: 3/10  Location: right knee      Objective     Date of surgery: 12/01/2022  POD: 22    PROM 2-0-96 deg at start of visit   PROM 2-0-105 deg at end of visit      Thomas received therapeutic exercises to develop strength, endurance, ROM, flexibility, posture, and core stabilization for 40 minutes including:     Patient education:  - swelling management  - x0 crutch with active quad setting in weightbearing -> x1 crutch is swelling increases    Bike x20 min Lvl 4 full [place] for ROM and swelling management    BFR 80% 30:15:15:15 for all:  Quad set  Bridge  Clam (R)    NEXT VISIT - Cont swelling management; quad activation in WB    Held:  Heel slide supine 2x4 min - pre / post manual  Prone quad stretch 2x5x30 sec - pre / post manual  Shuttle press DL 75# 3x10  Shuttle press SL 32.5# 3x10     Thomas received the following manual therapy techniques: Joint mobilizations,  Manual Lymphatic Drainage, and Soft tissue Mobilization were applied to the: R knee for 6 minutes, including:    PROM knee flexion/extension  AP mobilization Grade III-IV  PFJ superior / inferior mobilization Grade II-III    Held:  IASTM to quad, around incision sites   Quad stretch supine EOT     Thomas participated in neuromuscular re-education activities to improve: Coordination, Kinesthetic, Sense, Proprioception, and Posture for 24 minutes. The following activities were included:    Biofeedback quad activation:  Quad set x8 min 10:10 sec on/off  ASLR x8 min 10:20 sec on/off  TKE GTB x8 min       Thomas received cold pack for 10 minutes to to decrease circulation, pain, and swelling.      Home Exercises Provided and Patient Education Provided     Education provided:   - see above    Written Home Exercises Provided: Patient instructed to cont prior HEP.  Exercises were reviewed and Thomas was able to demonstrate them prior to the end of the session.  Thomas demonstrated good  understanding of the education provided.     See EMR under Patient Instructions for exercises provided prior visit.    Assessment     Cont to demonstrate swelling that is limiting improvements in ROM into flexion. Cont to advise swelling management.    Demonstrates good quad activation thu in NWB, but when transitioning to weightbearing patient has difficulty with quad activation leading to passive loading and likely increases in swelling. Improved following biofeedback training.    Needs cont focus on swelling management, LE ROM, and quad activation / strength.    Thomas Is progressing well towards his goals.   Pt prognosis is Excellent.     Pt will continue to benefit from skilled outpatient physical therapy to address the deficits listed in the problem list box on initial evaluation, provide pt/family education and to maximize pt's level of independence in the home and community environment.     Pt's spiritual, cultural and educational needs  considered and pt agreeable to plan of care and goals.    Anticipated barriers to physical therapy: none    GOALS: Short Term Goals: 0-3 months  1.Report decreased R knee pain  < / =  1/10  to increase tolerance for amb  2. Increase knee ROM to 3-0-145 in order to be able to perform ADLs without difficulty.  3. Increase strength by 1/3 MMT grade in Quads to increase tolerance for ADL and work activities.  4. Able to amb w/o deviation or complaint  5. Pt to tolerate HEP to improve ROM and independence with ADL's     Long Term Goals: 3-9 months  1. Able to return to running linearly   2. Able to complete [place] return to sport testing  2.[facility]: [facility]  3.Increase strength to >/= 4+/5 in Quad and hip musculature to increase tolerance for ADL and work activities.  4. Pt will report at CJ level (20-40% impaired) on FOTO knee to demonstrate increase in LE function with every day tasks.     Plan     See POC in treatment section for evaluation    YOLETTE MEDINA, PT, DPT, OCS

## 2022-12-27 ENCOUNTER — CLINICAL SUPPORT (OUTPATIENT)
Dept: REHABILITATION | Facility: HOSPITAL | Age: 47
End: 2022-12-27
Payer: COMMERCIAL

## 2022-12-27 DIAGNOSIS — M25.661 DECREASED ROM OF RIGHT KNEE: ICD-10-CM

## 2022-12-27 DIAGNOSIS — M25.561 RIGHT KNEE PAIN, UNSPECIFIED CHRONICITY: Primary | ICD-10-CM

## 2022-12-27 PROCEDURE — 97110 THERAPEUTIC EXERCISES: CPT

## 2022-12-27 PROCEDURE — 97112 NEUROMUSCULAR REEDUCATION: CPT

## 2022-12-27 NOTE — PROGRESS NOTES
Physical Therapy Daily Treatment Note     Name: Thomas Taveras  Clinic Number: [id number]    Therapy Diagnosis:   No diagnosis found.      Physician: Teodoro Marcus MD    Visit Date: 12/27/2022  Physician Orders: PT Eval and Treat   Medical Diagnosis from Referral: S83.511A (ICD-10-CM) - Complete tear of anterior cruciate ligament of right knee, initial encounter  Evaluation Date: 12/2/2022  Authorization Period Expiration: 12/01/2023  Plan of Care Expiration: 12/01/2023  Visit # / Visits authorized: 10/ 12     Time In: 1050   Time Out: 1200   Total Appointment Time (timed & untimed codes): 70 min - 10 min ice      Precautions: Standard     Procedure:  Right anterior cruciate ligament reconstruction with bone patellar tendon bone autograft    Subjective     Pt reports: Consistent with HEP. Had one event of trying to shake a bug off his leg and had a sharp pain that subsided within a min. Otherwise has been able to do more w/o crutch at home, but uses it for long distances.    He was compliant with home exercise program.  Response to previous treatment: HEP complaince  Functional change: ambulating with x1 crutches and brace unlocked.     Pain: 3/10  Location: right knee      Objective     Date of surgery: 12/01/2022  POD: 26    PROM 2-0-103 deg at start of visit   PROM 2-0-110 deg at end of visit      Thomas received therapeutic exercises to develop strength, endurance, ROM, flexibility, posture, and core stabilization for 50 minutes including:     Patient education:  - swelling management  - x0 crutch with active quad setting in weightbearing -> x1 crutch is swelling increases    Bike x15 min Lvl 4 full [place] for ROM and swelling management  Heel raise 3x10  Single leg hip abduction 3x10 ea  Sidesteps at table YTB x burn  Quad stretch supine 3x30 sec  Heel slide x5 min    BFR 80% 30:15:15:15 for all:  Quad set  Clam (R)  Bridge w/ ER      NEXT VISIT - Cont swelling management; quad activation in  WB      Held:  Heel slide supine 2x4 min - pre / post manual  Prone quad stretch 2x5x30 sec - pre / post manual  Shuttle press DL 75# 3x10  Shuttle press SL 32.5# 3x10     Thomas received the following manual therapy techniques: Joint mobilizations, Manual Lymphatic Drainage, and Soft tissue Mobilization were applied to the: R knee for 00 minutes, including:    Held:  PROM knee flexion/extension  AP mobilization Grade III-IV  PFJ superior / inferior mobilization Grade II-III  IASTM to quad, around incision sites   Quad stretch supine EOT     Thomas participated in neuromuscular re-education activities to improve: Coordination, Kinesthetic, Sense, Proprioception, and Posture for 10 minutes. The following activities were included:    Biofeedback quad activation:  Quad set x5 min 10:10 sec on/off  TKE GTB x5 min 10:10 sec on/off    Held:  ASLR x8 min 10:20 sec on/off      Thomas received cold pack for 10 minutes to to decrease circulation, pain, and swelling.      Home Exercises Provided and Patient Education Provided     Education provided:   - see above    Written Home Exercises Provided: Patient instructed to cont prior HEP.  Exercises were reviewed and Thomas was able to demonstrate them prior to the end of the session.  Thomas demonstrated good  understanding of the education provided.     See EMR under Patient Instructions for exercises provided prior visit.    Assessment     Improving swelling leading to improvements in ROM into flexion. Improved further within visit with swelling management therex. Cont to advise swelling management outside of visits.    Again demonstrates good quad activation thu in NWB, but when transitioning to weightbearing patient has difficulty with quad activation leading to passive loading and likely increases in swelling. Improved again following biofeedback training.    Needs cont focus on swelling management, LE ROM, and quad activation / strength.    Thomas Is progressing well towards his  goals.   Pt prognosis is Excellent.     Pt will continue to benefit from skilled outpatient physical therapy to address the deficits listed in the problem list box on initial evaluation, provide pt/family education and to maximize pt's level of independence in the home and community environment.     Pt's spiritual, cultural and educational needs considered and pt agreeable to plan of care and goals.    Anticipated barriers to physical therapy: none    GOALS: Short Term Goals: 0-3 months  1.Report decreased R knee pain  < / =  1/10  to increase tolerance for amb  2. Increase knee ROM to 3-0-145 in order to be able to perform ADLs without difficulty.  3. Increase strength by 1/3 MMT grade in Quads to increase tolerance for ADL and work activities.  4. Able to amb w/o deviation or complaint  5. Pt to tolerate HEP to improve ROM and independence with ADL's     Long Term Goals: 3-9 months  1. Able to return to running linearly   2. Able to complete [place] return to sport testing  2.Patient goal: Return to [facility]  3.Increase strength to >/= 4+/5 in Quad and hip musculature to increase tolerance for ADL and work activities.  4. Pt will report at CJ level (20-40% impaired) on FOTO knee to demonstrate increase in LE function with every day tasks.     Plan     See POC in treatment section for evaluation    YOLETTE MEDINA, PT, DPT, OCS

## 2022-12-28 ENCOUNTER — CLINICAL SUPPORT (OUTPATIENT)
Dept: REHABILITATION | Facility: HOSPITAL | Age: 47
End: 2022-12-28
Payer: COMMERCIAL

## 2022-12-28 DIAGNOSIS — M25.661 DECREASED ROM OF RIGHT KNEE: ICD-10-CM

## 2022-12-28 DIAGNOSIS — M25.561 RIGHT KNEE PAIN, UNSPECIFIED CHRONICITY: Primary | ICD-10-CM

## 2022-12-28 PROCEDURE — 97110 THERAPEUTIC EXERCISES: CPT

## 2022-12-28 PROCEDURE — 97112 NEUROMUSCULAR REEDUCATION: CPT

## 2022-12-28 NOTE — PROGRESS NOTES
Physical Therapy Daily Treatment Note     Name: Thomas Taveras  Clinic Number: [id number]    Therapy Diagnosis:   Encounter Diagnoses   Name Primary?    Right knee pain, unspecified chronicity Yes    Decreased ROM of right knee      Physician: Teodoro Marcus MD    Visit Date: 12/28/2022  Physician Orders: PT Eval and Treat   Medical Diagnosis from Referral: S83.511A (ICD-10-CM) - Complete tear of anterior cruciate ligament of right knee, initial encounter  Evaluation Date: 12/2/2022  Authorization Period Expiration: 12/01/2023  Plan of Care Expiration: 12/01/2023  Visit # / Visits authorized: 11/ 12     Time In: 0950  Time Out: 1100  Total Appointment Time (timed & untimed codes): 70 min - 10 min ice      Precautions: Standard     Procedure:  Right anterior cruciate ligament reconstruction with bone patellar tendon bone autograft    Subjective     Pt reports: No complaints since last seen.    He was compliant with home exercise program.  Response to previous treatment: HEP complaince  Functional change: ambulating with x1 crutches and brace unlocked.     Pain: 3/10  Location: right knee      Objective     Date of surgery: 12/01/2022  POD: 27    PROM 2-0-103 deg at start of visit   PROM 2-0-112 deg at end of visit      Thomas received therapeutic exercises to develop strength, endurance, ROM, flexibility, posture, and core stabilization for 50 minutes including:     Patient education:  - swelling management  - x0 crutch with active quad setting in weightbearing -> x1 crutch is swelling increases    Bike x10 min Lvl 4 full [place] for ROM and swelling management  Heel raise 3x15  Single leg hip abduction YTB 3x10 ea  Sidesteps at table YTB x burn  Heel slide 2x5 min  Quad stretch supine 3x30 sec - NT    BFR 80% 30:15:15:15 for all:  Quad set  Clam (R) - NT  Supine SLR  Bridge w/ ER      NEXT VISIT - Cont swelling management; quad activation in WB      Held:  Heel slide supine 2x4 min - pre / post manual  Prone quad  stretch 2x5x30 sec - pre / post manual  Shuttle press DL 75# 3x10  Shuttle press SL 32.5# 3x10     Thomas received the following manual therapy techniques: Joint mobilizations, Manual Lymphatic Drainage, and Soft tissue Mobilization were applied to the: R knee for 00 minutes, including:    Held:  PROM knee flexion/extension  AP mobilization Grade III-IV  PFJ superior / inferior mobilization Grade II-III  IASTM to quad, around incision sites   Quad stretch supine EOT     Thomas participated in neuromuscular re-education activities to improve: Coordination, Kinesthetic, Sense, Proprioception, and Posture for 10 minutes. The following activities were included:    Biofeedback quad activation:  LAQ x5 min 10:10 sec on/off  SAQ x5 min 10:10 sec on/off    Held:  TKE  ASLR x8 min 10:20 sec on/off      Thomas received cold pack for 10 minutes to to decrease circulation, pain, and swelling.      Home Exercises Provided and Patient Education Provided     Education provided:   - see above    Written Home Exercises Provided: Patient instructed to cont prior HEP.  Exercises were reviewed and Thomas was able to demonstrate them prior to the end of the session.  Thomas demonstrated good  understanding of the education provided.     See EMR under Patient Instructions for exercises provided prior visit.    Assessment     Wayne all interventions well within visit. Noted slowly improving swelling leading to cont slow improvements in flexion ROM. Cont to advise swelling management outside of visits.    Held on weight bearing quad activation today, but this will cont to require focus.    Needs cont focus on swelling management, LE ROM, and quad activation / strength.    Thomas Is progressing well towards his goals.   Pt prognosis is Excellent.     Pt will continue to benefit from skilled outpatient physical therapy to address the deficits listed in the problem list box on initial evaluation, provide pt/family education and to maximize pt's level  of independence in the home and community environment.     Pt's spiritual, cultural and educational needs considered and pt agreeable to plan of care and goals.    Anticipated barriers to physical therapy: none    GOALS: Short Term Goals: 0-3 months  1.Report decreased R knee pain  < / =  1/10  to increase tolerance for amb  2. Increase knee ROM to 3-0-145 in order to be able to perform ADLs without difficulty.  3. Increase strength by 1/3 MMT grade in Quads to increase tolerance for ADL and work activities.  4. Able to amb w/o deviation or complaint  5. Pt to tolerate HEP to improve ROM and independence with ADL's     Long Term Goals: 3-9 months  1. Able to return to running linearly   2. Able to complete [place] return to sport testing  2.Patient [facility]: [facility]  3.Increase strength to >/= 4+/5 in Quad and hip musculature to increase tolerance for ADL and work activities.  4. Pt will report at CJ level (20-40% impaired) on FOTO knee to demonstrate increase in LE function with every day tasks.     Plan     See POC in treatment section for evaluation    YOLETTE MEDINA, PT, DPT, OCS

## 2022-12-28 NOTE — PROGRESS NOTES
"Patient ID:   Thomas Taveras is a 47 y.o. male.    Chief Complaint:   Surgical aftercare s/p R ACLR with BTB autograft    HPI:   Mr. Taveras is returning for his first post-op visit. He denies any pain. He reports that PT is going well.     Medications:    Current Outpatient Medications:     emtricitabine-tenofovir 200-300 mg (TRUVADA) 200-300 mg Tab, Take 1 tablet by mouth., Disp: , Rfl:     ketoconazole (NIZORAL) 2 % shampoo, , Disp: , Rfl:     Allergies:  Review of patient's allergies indicates:  No Known Allergies    Vitals:  /79   Pulse 76   Ht 5' 8" (1.727 m)   Wt 72.6 kg (160 lb 0.9 oz)   BMI 24.34 kg/m²     Physical Examination:  Ortho Exam   Right knee exam:  Minimal effusion.   Good patellar mobility.   Lachmans 0.   Skin incisions C/D/I.    Assessment:  1. S/P ACL reconstruction      Plan:  The skin sutures were removed today. He will continue PT. Follow-up in one month.        No follow-ups on file.          "

## 2022-12-30 ENCOUNTER — CLINICAL SUPPORT (OUTPATIENT)
Dept: REHABILITATION | Facility: HOSPITAL | Age: 47
End: 2022-12-30
Payer: COMMERCIAL

## 2022-12-30 DIAGNOSIS — M25.561 RIGHT KNEE PAIN, UNSPECIFIED CHRONICITY: Primary | ICD-10-CM

## 2022-12-30 DIAGNOSIS — M25.661 DECREASED ROM OF RIGHT KNEE: ICD-10-CM

## 2022-12-30 PROCEDURE — 97110 THERAPEUTIC EXERCISES: CPT

## 2022-12-30 PROCEDURE — 97140 MANUAL THERAPY 1/> REGIONS: CPT

## 2022-12-30 NOTE — PROGRESS NOTES
Physical Therapy Daily Treatment Note     Name: Thomas Taveras  Clinic Number: [id number]    Therapy Diagnosis:   Encounter Diagnoses   Name Primary?    Right knee pain, unspecified chronicity Yes    Decreased ROM of right knee      Physician: Teodoro Marcus MD    Visit Date: 12/30/2022  Physician Orders: PT Eval and Treat   Medical Diagnosis from Referral: S83.511A (ICD-10-CM) - Complete tear of anterior cruciate ligament of right knee, initial encounter  Evaluation Date: 12/2/2022  Authorization Period Expiration: 12/01/2023  Plan of Care Expiration: 12/01/2023  Visit # / Visits authorized: 12/ 12     Time In: 1045  Time Out: 1200  Total Appointment Time (timed & untimed codes): 75 min - 10 min ice      Precautions: Standard     Procedure:  Right anterior cruciate ligament reconstruction with bone patellar tendon bone autograft    Subjective     Pt reports: Having some medial knee discomfort around scar. Unsure if this was from biking yesterday or not. Reports diligence and compliance with icing as well.    He was compliant with home exercise program.  Response to previous treatment: HEP complaince  Functional change: ambulating with x1 crutches and brace unlocked.     Pain: 0-3/10  Location: right knee      Objective     Date of surgery: 12/01/2022  POD: 29    PROM 2-0-110 deg at start of visit   PROM 2-0-115 deg at end of visit      Thomas received therapeutic exercises to develop strength, endurance, ROM, flexibility, posture, and core stabilization for 45 minutes including:     Patient education:  - swelling management  - x0 crutch with active quad setting in weightbearing    Bike x10 min Lvl 4 full [place] for ROM and swelling management  Heel slide x5 min  Stool scoot x3 laps DL    BFR 80% 30:15:15:15 for all:  Quad set - NT  Bridge w/ ER  Supine SLR  Clam (R)  Prone HS curl      Heel raise 3x15 - NT  Single leg hip abduction YTB 3x10 ea - NT  Sidesteps at table YTB x burn - NT  Quad stretch supine 3x30 sec  - NT  Shuttle press DL 75# 3x10 - NT  Shuttle press SL 32.5# 3x10 - NT      NEXT VISIT - Cont stool scoot; Progress weightbearing and ROM       Thomas received the following manual therapy techniques: Joint mobilizations, Manual Lymphatic Drainage, and Soft tissue Mobilization were applied to the: R knee for 20 minutes, including:    PROM knee flexion/extension  AP mobilization Grade III-IV  Scar mobilization    Held:  PFJ superior / inferior mobilization Grade II-III  IASTM to quad, around incision sites   Quad stretch supine EOT     Thomas participated in neuromuscular re-education activities to improve: Coordination, Kinesthetic, Sense, Proprioception, and Posture for 00 minutes. The following activities were included:    Held:  Biofeedback quad activation:  LAQ x5 min 10:10 sec on/off  SAQ x5 min 10:10 sec on/off  TKE  ASLR x8 min 10:20 sec on/off      Thomas received cold pack for 10 minutes to to decrease circulation, pain, and swelling.      Home Exercises Provided and Patient Education Provided     Education provided:   - see above    Written Home Exercises Provided: Patient instructed to cont prior HEP.  Exercises were reviewed and Thomas was able to demonstrate them prior to the end of the session.  Thomas demonstrated good  understanding of the education provided.     See EMR under Patient Instructions for exercises provided prior visit.    Assessment     Medial knee discomfort likely scar tissue around incision site.     Improving swelling overall with increased compliance outside of clinic with swelling management along with BFR interventions. Cont gradual improvement in knee flexion ROM over time.    Again held on weight bearing quad activation today, but this will cont to require focus.    Needs cont focus on swelling management, LE ROM, and quad activation / strength.    Thomas Is progressing well towards his goals.   Pt prognosis is Excellent.     Pt will continue to benefit from skilled outpatient  physical therapy to address the deficits listed in the problem list box on initial evaluation, provide pt/family education and to maximize pt's level of independence in the home and community environment.     Pt's spiritual, cultural and educational needs considered and pt agreeable to plan of care and goals.    Anticipated barriers to physical therapy: none    GOALS: Short Term Goals: 0-3 months  1.Report decreased R knee pain  < / =  1/10  to increase tolerance for amb  2. Increase knee ROM to 3-0-145 in order to be able to perform ADLs without difficulty.  3. Increase strength by 1/3 MMT grade in Quads to increase tolerance for ADL and work activities.  4. Able to amb w/o deviation or complaint  5. Pt to tolerate HEP to improve ROM and independence with ADL's     Long Term Goals: 3-9 months  1. Able to return to running linearly   2. Able to complete [place] return to sport testing  2.Patient goal: [facility]  3.Increase strength to >/= 4+/5 in Quad and hip musculature to increase tolerance for ADL and work activities.  4. Pt will report at CJ level (20-40% impaired) on FOTO knee to demonstrate increase in LE function with every day tasks.     Plan     See POC in treatment section for evaluation    YOLETTE MEDINA, PT, DPT, OCS

## 2023-01-04 ENCOUNTER — CLINICAL SUPPORT (OUTPATIENT)
Dept: REHABILITATION | Facility: HOSPITAL | Age: 48
End: 2023-01-04
Payer: COMMERCIAL

## 2023-01-04 DIAGNOSIS — M25.661 DECREASED ROM OF RIGHT KNEE: ICD-10-CM

## 2023-01-04 DIAGNOSIS — M25.561 RIGHT KNEE PAIN, UNSPECIFIED CHRONICITY: Primary | ICD-10-CM

## 2023-01-04 PROCEDURE — 97112 NEUROMUSCULAR REEDUCATION: CPT

## 2023-01-04 PROCEDURE — 97110 THERAPEUTIC EXERCISES: CPT

## 2023-01-04 NOTE — PROGRESS NOTES
Physical Therapy Daily Treatment Note     Name: Thomas Taveras  Clinic Number: [id number]    Therapy Diagnosis:   No diagnosis found.    Physician: Teodoro Marcus MD    Visit Date: 1/4/2023  Physician Orders: PT Eval and Treat   Medical Diagnosis from Referral: S83.511A (ICD-10-CM) - Complete tear of anterior cruciate ligament of right knee, initial encounter  Evaluation Date: 12/2/2022  Authorization Period Expiration: 12/01/2023  Plan of Care Expiration: 12/01/2023  Visit # / Visits authorized: 13/ 12 - PEND     Time In: 1300  Time Out: 1430  Total Appointment Time (timed & untimed codes): 90 min - 10 min ice      Precautions: Standard     Procedure:  Right anterior cruciate ligament reconstruction with bone patellar tendon bone autograft    Subjective     Pt reports: Went out to dinner on Friday and knee became swollen. Subsided with ice that day, but cont to swell with going out over the weekend. Some days it has been feeling looser and some days it feels swollen. Has also returned to work, which has involved walking and sitting more.    He was compliant with home exercise program.  Response to previous treatment: HEP complaince  Functional change: ambulating with x0-1 crutches and brace unlocked.     Pain: 0-3/10  Location: right knee      Objective     Date of surgery: 12/01/2022  POD: 34    PROM 2-0-105 deg at start of visit   PROM 2-0-113 deg at end of visit   Swelling measurement (at base of patella): 39 cm at end of visit     Thomas received therapeutic exercises to develop strength, endurance, ROM, flexibility, posture, and core stabilization for 60 minutes including:     Patient education:  - swelling management  - x0 crutch with active quad setting in weightbearing  - brace at home only     Bike x10 min Lvl 4 full [place] for ROM and swelling management  Heel slide x5 min  Stool scoot x5 laps DL  Heel raise 3x20    BFR 80% 30:15:15:15 for all:  Quad set elevated  Bridge w/ ER GTB   Clam (R) w/  YTB  Prone HS curl      Supine SLR - NT  Single leg hip abduction YTB 3x10 ea - NT  Sidesteps at table YTB x burn - NT  Quad stretch supine 3x30 sec - NT  Shuttle press DL 75# 3x10 - NT  Shuttle press SL 32.5# 3x10 - NT      NEXT VISIT - Cont stool scoot; Progress weightbearing and ROM with need for cont swelling management       Thomas received the following manual therapy techniques: Joint mobilizations, Manual Lymphatic Drainage, and Soft tissue Mobilization were applied to the: R knee for 00 minutes, including:    Held:  PROM knee flexion/extension  AP mobilization Grade III-IV  Scar mobilization  PFJ superior / inferior mobilization Grade II-III  IASTM to quad, around incision sites   Quad stretch supine EOT     Thomas participated in neuromuscular re-education activities to improve: Coordination, Kinesthetic, Sense, Proprioception, and Posture for 20 minutes. The following activities were included:    K-tape quad facilitation  Lateral espinoza walk x5 laps (high)  SL espinoza over fwd/bwd 2x10 ea - cuing quad squeeze  Lengthened stride gait x5 laps  Wall sit <90 deg 5x20 sec      Held:  Biofeedback quad activation:  LAQ x5 min 10:10 sec on/off  SAQ x5 min 10:10 sec on/off  TKE  ASLR x8 min 10:20 sec on/off      Thomas received cold pack for 10 minutes to to decrease circulation, pain, and swelling.      Home Exercises Provided and Patient Education Provided     Education provided:   - see above    Written Home Exercises Provided: Patient instructed to cont prior HEP.  Exercises were reviewed and Thomas was able to demonstrate them prior to the end of the session.  Thomas demonstrated good  understanding of the education provided.     See EMR under Patient Instructions for exercises provided prior visit.    Assessment     Patient returns from over weekend with increased swelling in R knee and reduced knee flexion ROM as a result of this. Increased swelling secondary to increased weightbearing and dependent positions  over the weekend and with return to work. Did a trial of standing therex, to promote active quad loading with noted cont decrease in knee flexion due to swelling following this. Able to reduce swelling and improve ROM with table therex and BFR at end of visit. Advised patient to reduce weightbearing activity and dependent positions to prevent cont swelling.    Needs cont focus on swelling management, LE ROM, and quad activation / strength.    Requires cont PT intervention ~4 weeks s/p ACLR. Recommend cont care 2x a week.    Thomas Is progressing well towards his goals.   Pt prognosis is Excellent.     Pt will continue to benefit from skilled outpatient physical therapy to address the deficits listed in the problem list box on initial evaluation, provide pt/family education and to maximize pt's level of independence in the home and community environment.     Pt's spiritual, cultural and educational needs considered and pt agreeable to plan of care and goals.    Anticipated barriers to physical therapy: none    GOALS: Short Term Goals: 0-3 months  1.Report decreased R knee pain  < / =  1/10  to increase tolerance for amb  2. Increase knee ROM to 3-0-145 in order to be able to perform ADLs without difficulty.  3. Increase strength by 1/3 MMT grade in Quads to increase tolerance for ADL and work activities.  4. Able to amb w/o deviation or complaint  5. Pt to tolerate HEP to improve ROM and independence with ADL's     Long Term Goals: 3-9 months  1. Able to return to running linearly   2. Able to complete [place] return to sport testing  2.Patient goal: Return to [employer]  3.Increase strength to >/= 4+/5 in Quad and hip musculature to increase tolerance for ADL and work activities.  4. Pt will report at CJ level (20-40% impaired) on FOTO knee to demonstrate increase in LE function with every day tasks.     Plan     See POC in treatment section for evaluation; Recommend cont care 2x a week.    YOLETTE MEDINA, PT, DPT,  OCS

## 2023-01-06 ENCOUNTER — CLINICAL SUPPORT (OUTPATIENT)
Dept: REHABILITATION | Facility: HOSPITAL | Age: 48
End: 2023-01-06
Payer: COMMERCIAL

## 2023-01-06 DIAGNOSIS — M25.661 DECREASED ROM OF RIGHT KNEE: ICD-10-CM

## 2023-01-06 DIAGNOSIS — M25.561 RIGHT KNEE PAIN, UNSPECIFIED CHRONICITY: Primary | ICD-10-CM

## 2023-01-06 PROCEDURE — 97110 THERAPEUTIC EXERCISES: CPT

## 2023-01-06 PROCEDURE — 97112 NEUROMUSCULAR REEDUCATION: CPT

## 2023-01-06 NOTE — PROGRESS NOTES
Physical Therapy Daily Treatment Note     Name: Thomas Taveras  Clinic Number: [id number]    Therapy Diagnosis:   No diagnosis found.    Physician: Teodoro Marcus MD    Visit Date: 1/6/2023  Physician Orders: PT Eval and Treat   Medical Diagnosis from Referral: S83.511A (ICD-10-CM) - Complete tear of anterior cruciate ligament of right knee, initial encounter  Evaluation Date: 12/2/2022  Authorization Period Expiration: 12/01/2023  Plan of Care Expiration: 12/01/2023  Visit # / Visits authorized: 14/ 12 - PEND     Time In: 1300   Time Out: 1410  Total Appointment Time (timed & untimed codes): 70 min - 10 min ice      Precautions: Standard     Procedure:  Right anterior cruciate ligament reconstruction with bone patellar tendon bone autograft    Subjective     Pt reports: Had another dinner out and had to get up and leave as knee became sore. Has been very consistent with icing and elevating. Decreased brace use at home per instruction without complaint.    He was compliant with home exercise program.  Response to previous treatment: HEP complaince  Functional change: ambulating with x0-1 crutches and brace unlocked.     Pain: 0-3/10  Location: right knee      Objective     Date of surgery: 12/01/2022  POD: 36    PROM 2-0-105 deg at start of visit  -NT  PROM 2-0-113 deg at end of visit - NT  Swelling measurement (at base of patella): 39 cm      Thomas received therapeutic exercises to develop strength, endurance, ROM, flexibility, posture, and core stabilization for 40 minutes including:     Patient education:  - swelling management  - Weightbearing ACTIVE QUAD LOADING    Bike x8 min Lvl 5 full [place] for ROM and swelling management  ASLR 2lbs 3x10  Sled pulls w/ TRX x4 laps  Shuttle SL 1B1R 3x5 - cue eccentric  Stool scoot x3 laps DL    BFR 60% 30:15:15:15 for all:  Quad set elevated  Prone HS curl  Bridge w/ ER GTB - NT   Clam (R) w/ YTB - NT        Heel slide x5 min - NT  Heel raise 3x20 - NT  Single leg hip  abduction YTB 3x10 ea - NT  Sidesteps at table YTB x burn - NT  Quad stretch supine 3x30 sec - NT  Shuttle press DL 75# 3x10 - NT  Shuttle press SL 32.5# 3x10 - NT       Thomas received the following manual therapy techniques: Joint mobilizations, Manual Lymphatic Drainage, and Soft tissue Mobilization were applied to the: R knee for 00 minutes, including:    Held:  PROM knee flexion/extension  AP mobilization Grade III-IV  Scar mobilization  PFJ superior / inferior mobilization Grade II-III  IASTM to quad, around incision sites   Quad stretch supine EOT     Thomas participated in neuromuscular re-education activities to improve: Coordination, Kinesthetic, Sense, Proprioception, and Posture for 20 minutes. The following activities were included:    K-tape quad facilitation  Fwd walking Treadmill x6 min 1.2 MPH 5.0 incline - active quad  Bwd walking Treadmill x6 min 1.2 MPH 5.0 incline - active quad  Lateral espinoza walk x5 laps (high)      Held:  Biofeedback quad activation:  LAQ x5 min 10:10 sec on/off  SAQ x5 min 10:10 sec on/off  TKE  ASLR x8 min 10:20 sec on/off      Thomas received cold pack for 10 minutes to to decrease circulation, pain, and swelling.      Home Exercises Provided and Patient Education Provided     Education provided:   - see above    Written Home Exercises Provided: Patient instructed to cont prior HEP.  Exercises were reviewed and Thomas was able to demonstrate them prior to the end of the session.  Thomas demonstrated good  understanding of the education provided.     See EMR under Patient Instructions for exercises provided prior visit.    Assessment     Cont swelling around same level as last visit. Focused on active quad loading in weightbearing throughout visit to help reduce passive loading to knee ultimately to reduce increases in swelling with ADLs; fair understanding of this during visit, but may cont to require reminders of importance. Patient did not have any change in swelling  following weightbearing, which was promising for improved active loading mechanics. Followed this with BFR and icing to help further reduce swelling jina well.    Needs cont focus on swelling management, LE ROM, and quad activation / strength.    Requires cont PT intervention ~4 weeks s/p ACLR. Recommend cont care 2x a week.    Thomas Is progressing well towards his goals.   Pt prognosis is Excellent.     Pt will continue to benefit from skilled outpatient physical therapy to address the deficits listed in the problem list box on initial evaluation, provide pt/family education and to maximize pt's level of independence in the home and community environment.     Pt's spiritual, cultural and educational needs considered and pt agreeable to plan of care and goals.    Anticipated barriers to physical therapy: none    GOALS: Short Term Goals: 0-3 months  1.Report decreased R knee pain  < / =  1/10  to increase tolerance for amb  2. Increase knee ROM to 3-0-145 in order to be able to perform ADLs without difficulty.  3. Increase strength by 1/3 MMT grade in Quads to increase tolerance for ADL and work activities.  4. Able to amb w/o deviation or complaint  5. Pt to tolerate HEP to improve ROM and independence with ADL's     Long Term Goals: 3-9 months  1. Able to return to running linearly   2. Able to complete [place] return to sport testing  2.Patient goal: [facility]  3.Increase strength to >/= 4+/5 in Quad and hip musculature to increase tolerance for ADL and work activities.  4. Pt will report at CJ level (20-40% impaired) on FOTO knee to demonstrate increase in LE function with every day tasks.     Plan     See POC in treatment section for evaluation; Recommend cont care 2x a week.    YOLETTE MEDINA, PT, DPT, OCS

## 2023-01-11 ENCOUNTER — CLINICAL SUPPORT (OUTPATIENT)
Dept: REHABILITATION | Facility: HOSPITAL | Age: 48
End: 2023-01-11
Payer: COMMERCIAL

## 2023-01-11 DIAGNOSIS — M25.561 RIGHT KNEE PAIN, UNSPECIFIED CHRONICITY: Primary | ICD-10-CM

## 2023-01-11 DIAGNOSIS — M25.661 DECREASED ROM OF RIGHT KNEE: ICD-10-CM

## 2023-01-11 PROCEDURE — 97110 THERAPEUTIC EXERCISES: CPT

## 2023-01-11 PROCEDURE — 97140 MANUAL THERAPY 1/> REGIONS: CPT

## 2023-01-11 NOTE — PROGRESS NOTES
Physical Therapy Daily Treatment Note     Name: Thomas Taveras  Clinic Number: [id number]    Therapy Diagnosis:   No diagnosis found.    Physician: Teodoro Marcus MD    Visit Date: 1/11/2023  Physician Orders: PT Eval and Treat   Medical Diagnosis from Referral: S83.511A (ICD-10-CM) - Complete tear of anterior cruciate ligament of right knee, initial encounter  Evaluation Date: 12/2/2022  Authorization Period Expiration: 12/01/2023  Plan of Care Expiration: 12/01/2023  Visit # / Visits authorized: 15/ 12 - PEND     Time In: 1250   Time Out: 1400   Total Appointment Time (timed & untimed codes): 70 min - 10 min ice      Precautions: Standard     Procedure:  Right anterior cruciate ligament reconstruction with bone patellar tendon bone autograft    Subjective     Pt reports: Swelling reducing with increased swelling management. Cont to be diligent with HEP. Feeling like he is able to walk easier.    He was compliant with home exercise program.  Response to previous treatment: HEP complaince  Functional change: ambulating with x0-1 crutches and brace unlocked.     Pain: 0-3/10  Location: right knee      Objective     Date of surgery: 12/01/2022  POW ~6 weeks    PROM 2-0-110 deg at start of visit  PROM 2-0-125 deg at end of visit  Swelling measurement (at base of patella): 38.5 cm      Thomas received therapeutic exercises to develop strength, endurance, ROM, flexibility, posture, and core stabilization for 50 minutes including:     Patient education:  - swelling management  - Weightbearing ACTIVE QUAD LOADING    Bike x15 min Lvl 5 full [place] for ROM and swelling management  Quad set 3x30 sec  ASLR 2lbs 4x10  Heel slides x5 min  Stool scoot x4 laps DL  GTB sidesteps x5 laps  Sled pulls x4 laps - cue quad  Sled push x4 laps - cue quad      Shuttle SL 1B1R 3x5 - cue eccentric - NT  BFR 60% 30:15:15:15 for all:  Quad set elevated - NT  Prone HS curl - NT  Bridge w/ ER GTB - NT   Clam (R) w/ YTB - NT       Thomas received  the following manual therapy techniques: Joint mobilizations, Manual Lymphatic Drainage, and Soft tissue Mobilization were applied to the: R knee for 10 minutes, including:    PROM knee flexion/extension  AP mobilization Grade III-IV  Scar mobilization  PFJ superior / inferior mobilization Grade II-III      Held:  IASTM to quad, around incision sites   Quad stretch supine EOT     Thomas participated in neuromuscular re-education activities to improve: Coordination, Kinesthetic, Sense, Proprioception, and Posture for 00 minutes. The following activities were included:    Held:  K-tape quad facilitation  Fwd walking Treadmill x6 min 1.2 MPH 5.0 incline - active quad  Bwd walking Treadmill x6 min 1.2 MPH 5.0 incline - active quad  Lateral espinoza walk x5 laps (high)  Biofeedback quad activation:  LAQ x5 min 10:10 sec on/off  SAQ x5 min 10:10 sec on/off  TKE  ASLR x8 min 10:20 sec on/off      Thomas received cold pack for 10 minutes to to decrease circulation, pain, and swelling.      Home Exercises Provided and Patient Education Provided     Education provided:   - see above    Written Home Exercises Provided: Patient instructed to cont prior HEP.  Exercises were reviewed and Thomas was able to demonstrate them prior to the end of the session.  Thomas demonstrated good  understanding of the education provided.     See EMR under Patient Instructions for exercises provided prior visit.    Assessment     Reducing swelling though still present. Improving understanding of active quad loading through weightbearing and good challenge to muscle tone with SLR cuing for pace. Flexion ROM also improved well with manual interventions.    Needs cont focus on swelling management, LE ROM, and quad activation / strength.    Requires cont PT intervention s/p ACLR. Recommend cont care 2x a week.    Thomas Is progressing well towards his goals.   Pt prognosis is Excellent.     Pt will continue to benefit from skilled outpatient physical  therapy to address the deficits listed in the problem list box on initial evaluation, provide pt/family education and to maximize pt's level of independence in the home and community environment.     Pt's spiritual, cultural and educational needs considered and pt agreeable to plan of care and goals.    Anticipated barriers to physical therapy: none    GOALS: Short Term Goals: 0-3 months  1.Report decreased R knee pain  < / =  1/10  to increase tolerance for amb  2. Increase knee ROM to 3-0-145 in order to be able to perform ADLs without difficulty.  3. Increase strength by 1/3 MMT grade in Quads to increase tolerance for ADL and work activities.  4. Able to amb w/o deviation or complaint  5. Pt to tolerate HEP to improve ROM and independence with ADL's     Long Term Goals: 3-9 months  1. Able to return to running linearly   2. Able to complete [place] return to sport testing  2.Patient goal: Return to [employer]  3.Increase strength to >/= 4+/5 in Quad and hip musculature to increase tolerance for ADL and work activities.  4. Pt will report at CJ level (20-40% impaired) on FOTO knee to demonstrate increase in LE function with every day tasks.     Plan     See POC in treatment section for evaluation; Recommend cont care 2x a week.    YOLETTE MEDINA, PT, DPT, OCS

## 2023-01-13 ENCOUNTER — CLINICAL SUPPORT (OUTPATIENT)
Dept: REHABILITATION | Facility: HOSPITAL | Age: 48
End: 2023-01-13
Payer: COMMERCIAL

## 2023-01-13 DIAGNOSIS — M25.561 RIGHT KNEE PAIN, UNSPECIFIED CHRONICITY: Primary | ICD-10-CM

## 2023-01-13 DIAGNOSIS — M25.661 DECREASED ROM OF RIGHT KNEE: ICD-10-CM

## 2023-01-13 PROCEDURE — 97110 THERAPEUTIC EXERCISES: CPT

## 2023-01-13 NOTE — PROGRESS NOTES
Physical Therapy Daily Treatment Note     Name: Thomas Taveras  Clinic Number: [id number]    Therapy Diagnosis:   Encounter Diagnoses   Name Primary?    Right knee pain, unspecified chronicity Yes    Decreased ROM of right knee        Physician: Teodoro Marcus MD    Visit Date: 1/13/2023  Physician Orders: PT Eval and Treat   Medical Diagnosis from Referral: S83.511A (ICD-10-CM) - Complete tear of anterior cruciate ligament of right knee, initial encounter  Evaluation Date: 12/2/2022  Authorization Period Expiration: 12/01/2023  Plan of Care Expiration: 12/01/2023  Visit # / Visits authorized: 16 Total; 4/20    Time In: 1255  Time Out: 1405    Total Appointment Time (timed & untimed codes): 70 min - 10 min ice      Precautions: Standard     Procedure:  Right anterior cruciate ligament reconstruction with bone patellar tendon bone autograft    Subjective     Pt reports: Some increased swelling from weightbearing activities. FU with MD next Tuesday.    He was compliant with home exercise program.  Response to previous treatment: HEP complaince  Functional change: brace walking    Pain: 0-3/10  Location: right knee      Objective     Date of surgery: 12/01/2022  POW ~6 weeks     PROM 2-0-110 deg at start of visit - NT  PROM 2-0-125 deg at end of visit - NT  Swelling measurement (at base of patella): 39 cm      Thomas received therapeutic exercises to develop strength, endurance, ROM, flexibility, posture, and core stabilization for 60 minutes including:     Patient education:  - swelling management  - Weightbearing ACTIVE QUAD LOADING    Bike x15 min Lvl 5 full [place] for ROM and swelling management  ASLR 2lbs 4x10  Heel slides x5 min  Stool scoot x5 laps DL    BFR 80% 30:15:15:15 for all:  Bridge w/ ER GTB  SAQ supine  Prone HS curl  SL hip abd    Prone quad stretch 4x30 sec with PT  GTB clam holds 3x30 sec ea    GTB sidesteps x5 laps - NT  Sled pulls x4 laps - cue quad - NT  Sled push x4 laps - cue quad -  NT  Shuttle SL 1B1R 3x5 - cue eccentric - NT       Thomas received the following manual therapy techniques: Joint mobilizations, Manual Lymphatic Drainage, and Soft tissue Mobilization were applied to the: R knee for 00 minutes, including:    Held:  PROM knee flexion/extension  AP mobilization Grade III-IV  Scar mobilization  PFJ superior / inferior mobilization Grade II-III  IASTM to quad, around incision sites   Quad stretch supine EOT     Thomas participated in neuromuscular re-education activities to improve: Coordination, Kinesthetic, Sense, Proprioception, and Posture for 00 minutes. The following activities were included:    Held:  K-tape quad facilitation  Fwd walking Treadmill x6 min 1.2 MPH 5.0 incline - active quad  Bwd walking Treadmill x6 min 1.2 MPH 5.0 incline - active quad  Lateral espionza walk x5 laps (high)  Biofeedback quad activation:  LAQ x5 min 10:10 sec on/off  SAQ x5 min 10:10 sec on/off  TKE  ASLR x8 min 10:20 sec on/off      Thomas received cold pack for 10 minutes to to decrease circulation, pain, and swelling.      Home Exercises Provided and Patient Education Provided     Education provided:   - see above    Written Home Exercises Provided: Patient instructed to cont prior HEP.  Exercises were reviewed and Thomas was able to demonstrate them prior to the end of the session.  Thomas demonstrated good  understanding of the education provided.     See EMR under Patient Instructions for exercises provided  1/13/2023 .    Assessment     Cont to have difficulty with weightbearing loading leading to swelling. Kept patient in NWB today with BFR to help with strength and address swelling. Adjusted HEP to help address strength deficits with cont education to focus on active loading mechanics.    Needs cont focus on swelling management, LE ROM, and quad activation / strength.    Thomas Is progressing well towards his goals.   Pt prognosis is Excellent.     Pt will continue to benefit from skilled  outpatient physical therapy to address the deficits listed in the problem list box on initial evaluation, provide pt/family education and to maximize pt's level of independence in the home and community environment.     Pt's spiritual, cultural and educational needs considered and pt agreeable to plan of care and goals.    Anticipated barriers to physical therapy: none    GOALS: Short Term Goals: 0-3 months  1.Report decreased R knee pain  < / =  1/10  to increase tolerance for amb  2. Increase knee ROM to 3-0-145 in order to be able to perform ADLs without difficulty.  3. Increase strength by 1/3 MMT grade in Quads to increase tolerance for ADL and work activities.  4. Able to amb w/o deviation or complaint  5. Pt to tolerate HEP to improve ROM and independence with ADL's     Long Term Goals: 3-9 months  1. Able to return to running linearly   2. Able to complete [place] return to sport testing  2.Patient goal: Return to [facility]  3.Increase strength to >/= 4+/5 in Quad and hip musculature to increase tolerance for ADL and work activities.  4. Pt will report at CJ level (20-40% impaired) on FOTO knee to demonstrate increase in LE function with every day tasks.     Plan     See POC in treatment section for evaluation; Recommend cont care 2x a week.    YOLETTE MEDINA, PT, DPT, OCS

## 2023-01-17 ENCOUNTER — OFFICE VISIT (OUTPATIENT)
Dept: ORTHOPEDICS | Facility: CLINIC | Age: 48
End: 2023-01-17
Payer: COMMERCIAL

## 2023-01-17 VITALS
HEART RATE: 105 BPM | SYSTOLIC BLOOD PRESSURE: 121 MMHG | WEIGHT: 163.56 LBS | HEIGHT: 68 IN | DIASTOLIC BLOOD PRESSURE: 71 MMHG | BODY MASS INDEX: 24.79 KG/M2

## 2023-01-17 DIAGNOSIS — Z98.890 S/P ACL RECONSTRUCTION: Primary | ICD-10-CM

## 2023-01-17 PROCEDURE — 1159F MED LIST DOCD IN RCRD: CPT | Mod: CPTII,S$GLB,, | Performed by: ORTHOPAEDIC SURGERY

## 2023-01-17 PROCEDURE — 3074F SYST BP LT 130 MM HG: CPT | Mod: CPTII,S$GLB,, | Performed by: ORTHOPAEDIC SURGERY

## 2023-01-17 PROCEDURE — 3078F DIAST BP <80 MM HG: CPT | Mod: CPTII,S$GLB,, | Performed by: ORTHOPAEDIC SURGERY

## 2023-01-17 PROCEDURE — 99999 PR PBB SHADOW E&M-EST. PATIENT-LVL III: ICD-10-PCS | Mod: PBBFAC,,, | Performed by: ORTHOPAEDIC SURGERY

## 2023-01-17 PROCEDURE — 99024 PR POST-OP FOLLOW-UP VISIT: ICD-10-PCS | Mod: S$GLB,,, | Performed by: ORTHOPAEDIC SURGERY

## 2023-01-17 PROCEDURE — 3074F PR MOST RECENT SYSTOLIC BLOOD PRESSURE < 130 MM HG: ICD-10-PCS | Mod: CPTII,S$GLB,, | Performed by: ORTHOPAEDIC SURGERY

## 2023-01-17 PROCEDURE — 1159F PR MEDICATION LIST DOCUMENTED IN MEDICAL RECORD: ICD-10-PCS | Mod: CPTII,S$GLB,, | Performed by: ORTHOPAEDIC SURGERY

## 2023-01-17 PROCEDURE — 1160F PR REVIEW ALL MEDS BY PRESCRIBER/CLIN PHARMACIST DOCUMENTED: ICD-10-PCS | Mod: CPTII,S$GLB,, | Performed by: ORTHOPAEDIC SURGERY

## 2023-01-17 PROCEDURE — 3008F BODY MASS INDEX DOCD: CPT | Mod: CPTII,S$GLB,, | Performed by: ORTHOPAEDIC SURGERY

## 2023-01-17 PROCEDURE — 1160F RVW MEDS BY RX/DR IN RCRD: CPT | Mod: CPTII,S$GLB,, | Performed by: ORTHOPAEDIC SURGERY

## 2023-01-17 PROCEDURE — 99024 POSTOP FOLLOW-UP VISIT: CPT | Mod: S$GLB,,, | Performed by: ORTHOPAEDIC SURGERY

## 2023-01-17 PROCEDURE — 3078F PR MOST RECENT DIASTOLIC BLOOD PRESSURE < 80 MM HG: ICD-10-PCS | Mod: CPTII,S$GLB,, | Performed by: ORTHOPAEDIC SURGERY

## 2023-01-17 PROCEDURE — 99999 PR PBB SHADOW E&M-EST. PATIENT-LVL III: CPT | Mod: PBBFAC,,, | Performed by: ORTHOPAEDIC SURGERY

## 2023-01-17 PROCEDURE — 3008F PR BODY MASS INDEX (BMI) DOCUMENTED: ICD-10-PCS | Mod: CPTII,S$GLB,, | Performed by: ORTHOPAEDIC SURGERY

## 2023-01-17 NOTE — PROGRESS NOTES
"Patient ID:   Thomas Taveras is a 47 y.o. male.    Chief Complaint:   6w 5d s/p R ACLR with BTB autograft    HPI:   It is returning today for evaluation of his right knee.  He states that he is doing well.  Denies any new instability events.  Does report some limited deep flexion.    Medications:    Current Outpatient Medications:     emtricitabine-tenofovir 200-300 mg (TRUVADA) 200-300 mg Tab, Take 1 tablet by mouth., Disp: , Rfl:     ketoconazole (NIZORAL) 2 % shampoo, , Disp: , Rfl:     Allergies:  Review of patient's allergies indicates:  No Known Allergies    Vitals:  /71   Pulse 105   Ht 5' 8" (1.727 m)   Wt 74.2 kg (163 lb 9.3 oz)   BMI 24.87 kg/m²     Physical Examination:  Ortho Exam   Right knee exam:  Surgical wounds are healed.  2+ effusion.    Range of motion 0-100 on today's exam.    Lachman's 1A.    Assessment:  1. S/P ACL reconstruction      Plan:  Given his persistent effusion, I have recommended aspiration today.  He may discontinue use of his knee brace.  He will continue to progress with his physical therapist.  I would like for him to return in 6 weeks.       No follow-ups on file.          "

## 2023-01-17 NOTE — PROCEDURES
Large Joint Aspiration/Injection: R knee    Date/Time: 1/17/2023 10:30 AM  Performed by: Teodoro Marcus MD  Authorized by: Teodoro Marcus MD     Indications:  Joint swelling  Site marked: the procedure site was marked    Timeout: prior to procedure the correct patient, procedure, and site was verified    Prep: patient was prepped and draped in usual sterile fashion      Local anesthesia used?: Yes    Anesthesia:  Local infiltration  Local anesthetic:  Lidocaine 1% without epinephrine  Anesthetic total (ml):  8      Details:  Needle Size:  18 G  Ultrasonic Guidance for needle placement?: No    Approach:  Lateral  Location:  Knee  Site:  R knee  Aspirate amount (mL):  25  Aspirate:  Clear and yellow

## 2023-01-18 ENCOUNTER — CLINICAL SUPPORT (OUTPATIENT)
Dept: REHABILITATION | Facility: HOSPITAL | Age: 48
End: 2023-01-18
Payer: COMMERCIAL

## 2023-01-18 DIAGNOSIS — M25.661 DECREASED ROM OF RIGHT KNEE: ICD-10-CM

## 2023-01-18 DIAGNOSIS — M25.561 RIGHT KNEE PAIN, UNSPECIFIED CHRONICITY: Primary | ICD-10-CM

## 2023-01-18 PROCEDURE — 97110 THERAPEUTIC EXERCISES: CPT

## 2023-01-18 NOTE — PROGRESS NOTES
Physical Therapy Daily Treatment Note     Name: Thomas Taveras  Clinic Number: [id number]    Therapy Diagnosis:   No diagnosis found.      Physician: Teodoro Marcus MD    Visit Date: 1/18/2023  Physician Orders: PT Eval and Treat   Medical Diagnosis from Referral: S83.511A (ICD-10-CM) - Complete tear of anterior cruciate ligament of right knee, initial encounter  Evaluation Date: 12/2/2022  Authorization Period Expiration: 12/01/2023  Plan of Care Expiration: 12/01/2023  Visit # / Visits authorized: 16 Total; 5/20    Time In: 1255  Time Out: 1410    Total Appointment Time (timed & untimed codes): 75 min - 10 min ice      Precautions: Standard     Procedure:  Right anterior cruciate ligament reconstruction with bone patellar tendon bone autograft    Subjective     Pt reports: that he recently saw the MD and he aspirated his knee. Pt reports that his knee still feels like there is swelling present.    He was compliant with home exercise program.  Response to previous treatment: HEP complaince  Functional change: brace walking    Pain: 0-3/10  Location: right knee      Objective     Date of surgery: 12/01/2022  POW ~7 weeks     PROM 1-0-110 deg at start of visit - 1/18/23  PROM 2-0-125 deg at end of visit - NT  Swelling measurement (at base of patella): 38 cm - 1/18/23     Thomas received therapeutic exercises to develop strength, endurance, ROM, flexibility, posture, and core stabilization for 65 minutes including:     Patient education:  - swelling management  - Weightbearing ACTIVE QUAD LOADING    Bike x10 min Lvl 5 full [place] for ROM and swelling management  ASLR 2lbs 3x10 with 5 sec hold  St. TKE GTB 3x10 with 5 sec hold  Shuttle SL 1B 1R 2x10 with focus on eccentric  Wall Squat with swiss ball 2x15 focus on quad activation    BFR 80% 30:15:15:15 for all:  Bridge w/ ER  SL Clam  LAQ  Prone HS curl      Prone quad stretch 4x30 sec with PT NT  GTB clam holds 3x30 sec ea NT  GTB sidesteps x5 laps - NT  Sled pulls  x4 laps - cue quad - NT  Sled push x4 laps - cue quad - NT  Shuttle SL 1B1R 3x5 - cue eccentric - NT       Thomas received the following manual therapy techniques: Joint mobilizations, Manual Lymphatic Drainage, and Soft tissue Mobilization were applied to the: R knee for 00 minutes, including:    Held:  PROM knee flexion/extension  AP mobilization Grade III-IV  Scar mobilization  PFJ superior / inferior mobilization Grade II-III  IASTM to quad, around incision sites   Quad stretch supine EOT   Heel slides x5 min  Stool scoot x5 laps DL    Thomas participated in neuromuscular re-education activities to improve: Coordination, Kinesthetic, Sense, Proprioception, and Posture for 00 minutes. The following activities were included:    Held:  K-tape quad facilitation  Fwd walking Treadmill x6 min 1.2 MPH 5.0 incline - active quad  Bwd walking Treadmill x6 min 1.2 MPH 5.0 incline - active quad  Lateral espinoza walk x5 laps (high)  Biofeedback quad activation:  LAQ x5 min 10:10 sec on/off  SAQ x5 min 10:10 sec on/off  TKE  ASLR x8 min 10:20 sec on/off      Thomas received cold pack for 10 minutes to to decrease circulation, pain, and swelling.      Home Exercises Provided and Patient Education Provided     Education provided:   - see above    Written Home Exercises Provided: Patient instructed to cont prior HEP.  Exercises were reviewed and Thomas was able to demonstrate them prior to the end of the session.  Thomas demonstrated good  understanding of the education provided.     See EMR under Patient Instructions for exercises provided  1/13/2023 .    Assessment     Wayne all interventions well. Improving understanding of active loading mechanics. Reduced swelling following aspiration, but no changes in ROM noted.     Needs cont focus on swelling management, LE ROM, and quad activation / strength.    Thomas Is progressing well towards his goals.   Pt prognosis is Excellent.     Pt will continue to benefit from skilled outpatient  physical therapy to address the deficits listed in the problem list box on initial evaluation, provide pt/family education and to maximize pt's level of independence in the home and community environment.     Pt's spiritual, cultural and educational needs considered and pt agreeable to plan of care and goals.    Anticipated barriers to physical therapy: none    GOALS: Short Term Goals: 0-3 months  1.Report decreased R knee pain  < / =  1/10  to increase tolerance for amb  2. Increase knee ROM to 3-0-145 in order to be able to perform ADLs without difficulty.  3. Increase strength by 1/3 MMT grade in Quads to increase tolerance for ADL and work activities.  4. Able to amb w/o deviation or complaint  5. Pt to tolerate HEP to improve ROM and independence with ADL's     Long Term Goals: 3-9 months  1. Able to return to running linearly   2. Able to complete [place] return to sport testing  2.Patient goal: Return to [employer]  3.Increase strength to >/= 4+/5 in Quad and hip musculature to increase tolerance for ADL and work activities.  4. Pt will report at CJ level (20-40% impaired) on FOTO knee to demonstrate increase in LE function with every day tasks.     Plan     See POC in treatment section for evaluation; Recommend cont care 2x a week.    Adin Olson, SPT      I certify that I was present in the room directing the student in service delivery and guiding them using my skilled judgment. As the co-signing therapist I have reviewed the students documentation and am responsible for the treatment, assessment, and plan.      Teodoro Chisholm PT, DPT, OCS

## 2023-01-20 ENCOUNTER — CLINICAL SUPPORT (OUTPATIENT)
Dept: REHABILITATION | Facility: HOSPITAL | Age: 48
End: 2023-01-20
Payer: COMMERCIAL

## 2023-01-20 DIAGNOSIS — M25.561 RIGHT KNEE PAIN, UNSPECIFIED CHRONICITY: Primary | ICD-10-CM

## 2023-01-20 DIAGNOSIS — M25.661 DECREASED ROM OF RIGHT KNEE: ICD-10-CM

## 2023-01-20 PROCEDURE — 97140 MANUAL THERAPY 1/> REGIONS: CPT

## 2023-01-20 PROCEDURE — 97110 THERAPEUTIC EXERCISES: CPT

## 2023-01-20 NOTE — PROGRESS NOTES
Physical Therapy Daily Treatment Note     Name: Thomas Taveras  Clinic Number: [id number]    Therapy Diagnosis:   No diagnosis found.      Physician: Teodoro Marcus MD    Visit Date: 1/20/2023  Physician Orders: PT Eval and Treat   Medical Diagnosis from Referral: S83.511A (ICD-10-CM) - Complete tear of anterior cruciate ligament of right knee, initial encounter  Evaluation Date: 12/2/2022  Authorization Period Expiration: 12/01/2023  Plan of Care Expiration: 12/01/2023  Visit # / Visits authorized: 17 Total; 6/20    Time In: 1255  Time Out: 1405  Total Appointment Time (timed & untimed codes): 70 min - 10 min ice     Precautions: Standard     Procedure:  Right anterior cruciate ligament reconstruction with bone patellar tendon bone autograft    Subjective     Pt reports: that he was able to notice less swelling in his knee yesterday, but feels like his swelling is still similar today compared to previous session.    He was compliant with home exercise program.  Response to previous treatment: HEP complaince  Functional change: brace walking    Pain: 0-3/10  Location: right knee      Objective     Date of surgery: 12/01/2022  POW ~7 weeks     PROM 1-0-110 deg at start of visit - 1/18/23  PROM 2-0-125 deg at end of visit - NT  Swelling measurement (at base of patella): 38 cm - 1/20/23     Thomas received therapeutic exercises to develop strength, endurance, ROM, flexibility, posture, and core stabilization for 50 minutes including:     Patient education:  - swelling management  - Weightbearing ACTIVE QUAD LOADING    Bike x10 min Lvl 5 full [place] for ROM and swelling management  Heel slides 20x 10 sec hold  Stool scoot x5 laps  ASLR 2lbs 3x10 with 5 sec hold  Shuttle SL 2B 2x10 with focus on eccentric    BFR 80% 30:15:15:15 for all:  Bridge w/ ER  SLR  LAQ  Prone HS curl      Prone quad stretch 4x30 sec with PT NT  GTB clam holds 3x30 sec ea NT  GTB sidesteps x5 laps - NT  Sled pulls x4 laps - cue quad - NT  Sled  push x4 laps - cue quad - NT  Shuttle SL 1B1R 3x5 - cue eccentric - NT       Thomas received the following manual therapy techniques: Joint mobilizations, Manual Lymphatic Drainage, and Soft tissue Mobilization were applied to the: R knee for 10 minutes, including:    PROM knee flexion/extension  AP mobilization Grade III-IV    Held:  Scar mobilization  PFJ superior / inferior mobilization Grade II-III  IASTM to quad, around incision sites   Quad stretch supine EOT   Heel slides x5 min  Stool scoot x5 laps DL    Thomas participated in neuromuscular re-education activities to improve: Coordination, Kinesthetic, Sense, Proprioception, and Posture for 00 minutes. The following activities were included:    Held:  K-tape quad facilitation  Fwd walking Treadmill x6 min 1.2 MPH 5.0 incline - active quad  Bwd walking Treadmill x6 min 1.2 MPH 5.0 incline - active quad  Lateral espinoza walk x5 laps (high)  Biofeedback quad activation:  LAQ x5 min 10:10 sec on/off  SAQ x5 min 10:10 sec on/off  TKE  ASLR x8 min 10:20 sec on/off      Thomas received cold pack for 10 minutes to to decrease circulation, pain, and swelling.      Home Exercises Provided and Patient Education Provided     Education provided:   - see above    Written Home Exercises Provided: Patient instructed to cont prior HEP.  Exercises were reviewed and Thomas was able to demonstrate them prior to the end of the session.  Thomas demonstrated good  understanding of the education provided.     See EMR under Patient Instructions for exercises provided  1/13/2023 .    Assessment     Pt tolerated all therex well. Pt demonstrated no increased swelling of the knee compared to previous session. Pt demonstrated improved quad strength with tolerating progressions made today. Pt continues to demonstrate issues with maintaining quad control in CKC activities. Pt continues to have no increase in ROM.    Needs cont focus on swelling management, LE ROM, and quad activation /  strength.    Thomas Is progressing well towards his goals.   Pt prognosis is Excellent.     Pt will continue to benefit from skilled outpatient physical therapy to address the deficits listed in the problem list box on initial evaluation, provide pt/family education and to maximize pt's level of independence in the home and community environment.     Pt's spiritual, cultural and educational needs considered and pt agreeable to plan of care and goals.    Anticipated barriers to physical therapy: none    GOALS: Short Term Goals: 0-3 months  1.Report decreased R knee pain  < / =  1/10  to increase tolerance for amb  2. Increase knee ROM to 3-0-145 in order to be able to perform ADLs without difficulty.  3. Increase strength by 1/3 MMT grade in Quads to increase tolerance for ADL and work activities.  4. Able to amb w/o deviation or complaint  5. Pt to tolerate HEP to improve ROM and independence with ADL's     Long Term Goals: 3-9 months  1. Able to return to running linearly   2. Able to complete [place] return to sport testing  2.Patient goal: Return to [facility]  3.Increase strength to >/= 4+/5 in Quad and hip musculature to increase tolerance for ADL and work activities.  4. Pt will report at CJ level (20-40% impaired) on FOTO knee to demonstrate increase in LE function with every day tasks.     Plan     See POC in treatment section for evaluation; Recommend cont care 2x a week.    Adin Olson, SPT      I certify that I was present in the room directing the student in service delivery and guiding them using my skilled judgment. As the co-signing therapist I have reviewed the students documentation and am responsible for the treatment, assessment, and plan.      Teodoro Chisholm PT, DPT, OCS

## 2023-01-25 ENCOUNTER — CLINICAL SUPPORT (OUTPATIENT)
Dept: REHABILITATION | Facility: HOSPITAL | Age: 48
End: 2023-01-25
Payer: COMMERCIAL

## 2023-01-25 DIAGNOSIS — M25.561 RIGHT KNEE PAIN, UNSPECIFIED CHRONICITY: Primary | ICD-10-CM

## 2023-01-25 DIAGNOSIS — M25.661 DECREASED ROM OF RIGHT KNEE: ICD-10-CM

## 2023-01-25 PROCEDURE — 97140 MANUAL THERAPY 1/> REGIONS: CPT

## 2023-01-25 PROCEDURE — 97110 THERAPEUTIC EXERCISES: CPT

## 2023-01-25 NOTE — PROGRESS NOTES
Physical Therapy Daily Treatment Note     Name: Thomas Taveras  Clinic Number: [id number]    Therapy Diagnosis:   Encounter Diagnoses   Name Primary?    Right knee pain, unspecified chronicity Yes    Decreased ROM of right knee          Physician: Teodoro Marcus MD    Visit Date: 1/25/2023  Physician Orders: PT Eval and Treat   Medical Diagnosis from Referral: S83.511A (ICD-10-CM) - Complete tear of anterior cruciate ligament of right knee, initial encounter  Evaluation Date: 12/2/2022  Authorization Period Expiration: 12/01/2023  Plan of Care Expiration: 12/01/2023  Visit # / Visits authorized: 18 Total; 7/20    Time In: 1255  Time Out: 220  Total Appointment Time (timed & untimed codes): 85 min - 10 min ice     Precautions: Standard     Procedure:  Right anterior cruciate ligament reconstruction with bone patellar tendon bone autograft    Subjective     Pt reports: that he went to [place] yesterday and did a lot of walking and began to walk with a limp and states he thinks he flared up his knee. Pt reports it feels warm and more swollen than last session.    He was compliant with home exercise program.  Response to previous treatment: HEP complaince  Functional change: brace walking    Pain: 0-3/10  Location: right knee      Objective     Date of surgery: 12/01/2022  POW ~7 weeks     PROM 1-0-110 deg at start of visit - 1/25/23  PROM 1-0-125 deg at end of visit - 1/25/23  Swelling measurement (at base of patella): 38.5 cm - 1/25/23 with erythema     Thomas received therapeutic exercises for 55 min to develop strength, endurance, ROM, flexibility, posture, and core stabilization for  minutes including:     Patient education:  - swelling management  - Weightbearing ACTIVE QUAD LOADING    Bike x10 min Lvl 5 full [place] for ROM and swelling management  Heel slides 20x 10 sec hold  Stool scoot x5 laps  Supine knee to chest 3x30s  Prone Quad Stretch 3x30s  Seated Hamstring Curl Machine 10lb - Dbl leg 1x10 - Sgl leg  3x10      BFR 80% 30:15:15:15 for all:  Bridge w/ ball btw legs  Prone HS curl  Prone Hip Extension      ASLR 2lbs 3x10 with 5 sec hold NT  Shuttle SL 2B 2x10 with focus on eccentric NT  GTB clam holds 3x30 sec ea NT  GTB sidesteps x5 laps - NT  Sled pulls x4 laps - cue quad - NT  Sled push x4 laps - cue quad - NT  Shuttle SL 1B1R 3x5 - cue eccentric - NT       Thomas received the following manual therapy techniques: Joint mobilizations, Manual Lymphatic Drainage, and Soft tissue Mobilization were applied to the: R knee for 20 minutes, including:    PROM knee flexion/extension  AP mobilization Grade III-IV at various ranges + sustained glides during heel slide  Tibial IR mobilization grade III-IV    Held:  Scar mobilization  PFJ superior / inferior mobilization Grade II-III  IASTM to quad, around incision sites   Quad stretch supine EOT   Heel slides x5 min  Stool scoot x5 laps DL    Thomas participated in neuromuscular re-education activities to improve: Coordination, Kinesthetic, Sense, Proprioception, and Posture for 00 minutes. The following activities were included:    Held:  K-tape quad facilitation  Fwd walking Treadmill x6 min 1.2 MPH 5.0 incline - active quad  Bwd walking Treadmill x6 min 1.2 MPH 5.0 incline - active quad  Lateral espinoza walk x5 laps (high)  Biofeedback quad activation:  LAQ x5 min 10:10 sec on/off  SAQ x5 min 10:10 sec on/off  TKE  ASLR x8 min 10:20 sec on/off      Thomas received cold pack for 10 minutes to to decrease circulation, pain, and swelling.      Home Exercises Provided and Patient Education Provided     Education provided:   - see above    Written Home Exercises Provided: Patient instructed to cont prior HEP.  Exercises were reviewed and Thomas was able to demonstrate them prior to the end of the session.  Thomas demonstrated good  understanding of the education provided.     See EMR under Patient Instructions for exercises provided  1/13/2023 .    Assessment     Pt reported  increased walking yesterday resulting in a limp by the end of the day. Pts demonstrated errythema of the right knee with minimal increase in girth circumfrence. Due to recent flare up with increased walking today session focused more on gaining ROM and hamstring activation. Pt tolerated all therex performed today. Pt able to demonstrate increased knee flexion ROM at end of session subjective reports of less swelling in the knee.    Needs cont focus on swelling management, LE ROM, and quad activation / strength.    Thomas Is progressing well towards his goals.   Pt prognosis is Excellent.     Pt will continue to benefit from skilled outpatient physical therapy to address the deficits listed in the problem list box on initial evaluation, provide pt/family education and to maximize pt's level of independence in the home and community environment.     Pt's spiritual, cultural and educational needs considered and pt agreeable to plan of care and goals.    Anticipated barriers to physical therapy: none    GOALS: Short Term Goals: 0-3 months  1.Report decreased R knee pain  < / =  1/10  to increase tolerance for amb  2. Increase knee ROM to 3-0-145 in order to be able to perform ADLs without difficulty.  3. Increase strength by 1/3 MMT grade in Quads to increase tolerance for ADL and work activities.  4. Able to amb w/o deviation or complaint  5. Pt to tolerate HEP to improve ROM and independence with ADL's     Long Term Goals: 3-9 months  1. Able to return to running linearly   2. Able to complete [place] return to sport testing  2.Patient goal: [facility]  3.Increase strength to >/= 4+/5 in Quad and hip musculature to increase tolerance for ADL and work activities.  4. Pt will report at CJ level (20-40% impaired) on FOTO knee to demonstrate increase in LE function with every day tasks.     Plan     See POC in treatment section for evaluation; Recommend cont care 2x a week.    Adin Olson, SPT      I certify that I  was present in the room directing the student in service delivery and guiding them using my skilled judgment. As the co-signing therapist I have reviewed the students documentation and am responsible for the treatment, assessment, and plan.      Teodoro Chisholm PT, DPT, OCS

## 2023-01-27 ENCOUNTER — CLINICAL SUPPORT (OUTPATIENT)
Dept: REHABILITATION | Facility: HOSPITAL | Age: 48
End: 2023-01-27
Payer: COMMERCIAL

## 2023-01-27 DIAGNOSIS — M25.661 DECREASED ROM OF RIGHT KNEE: ICD-10-CM

## 2023-01-27 DIAGNOSIS — M25.561 RIGHT KNEE PAIN, UNSPECIFIED CHRONICITY: Primary | ICD-10-CM

## 2023-01-27 PROCEDURE — 97140 MANUAL THERAPY 1/> REGIONS: CPT

## 2023-01-27 PROCEDURE — 97110 THERAPEUTIC EXERCISES: CPT

## 2023-01-27 NOTE — PROGRESS NOTES
Physical Therapy Daily Treatment Note     Name: Thomas Taveras  Clinic Number: [id number]    Therapy Diagnosis:   No diagnosis found.        Physician: Teodoro Marcus MD    Visit Date: 1/27/2023  Physician Orders: PT Eval and Treat   Medical Diagnosis from Referral: S83.511A (ICD-10-CM) - Complete tear of anterior cruciate ligament of right knee, initial encounter  Evaluation Date: 12/2/2022  Authorization Period Expiration: 12/01/2023  Plan of Care Expiration: 12/01/2023  Visit # / Visits authorized: 19 Total; 8/20     Time In: 1250  Time Out: 1410  Total Appointment Time (timed & untimed codes): 80 min - 10 min ice     Precautions: Standard     Procedure:  Right anterior cruciate ligament reconstruction with bone patellar tendon bone autograft    Subjective     Pt reports:  No complaints. Knee feeling a little less swollen. Has been taking it easy.    He was compliant with home exercise program.  Response to previous treatment: HEP complaince  Functional change: brace walking    Pain: 0-3/10  Location: right knee      Objective     Date of surgery: 12/01/2022  POW ~8 weeks     PROM 1-0-116 deg at start of visit - 1/27/23  PROM 1-0-125 deg at end of visit - 1/27/23  Swelling measurement (at base of patella): 38 cm - 1/27/23 w/o erythema     Thomas received therapeutic exercises for 55 min to develop strength, endurance, ROM, flexibility, posture, and core stabilization including:     Patient education:  - swelling management  - Weightbearing ACTIVE QUAD LOADING    Bike x10 min Lvl 5 full [place] for ROM and swelling management  Heel slides 20x 10 sec hold  Stool scoot x5 laps  Supine knee to chest 3x30 sec   HS stretch neutral, R, L 3x30 sec ea  Seated Hamstring Curl Machine 15lb - Dbl leg 2x20 -  5lbs Sgl leg 2x20    BFR 80% 30:15:15:15 for all:  LAQ  Bridge w/ ball btw legs  SLR  Prone HS curl      NEXT VISIT - cont knee flexion ROM, HS and quad activation / strength       Prone Quad Stretch 3x30s - NT  ASLR 2lbs  3x10 with 5 sec hold NT  Shuttle SL 2B 2x10 with focus on eccentric NT  GTB clam holds 3x30 sec ea NT  GTB sidesteps x5 laps - NT  Sled pulls x4 laps - cue quad - NT  Sled push x4 laps - cue quad - NT  Shuttle SL 1B1R 3x5 - cue eccentric - NT       Thomas received the following manual therapy techniques: Joint mobilizations, Manual Lymphatic Drainage, and Soft tissue Mobilization were applied to the: R knee for 15 minutes, including:    PROM knee flexion/extension  AP mobilization Grade III-IV at various ranges + sustained glides during heel slide  Tibial IR mobilization grade III-IV    Held:  Scar mobilization  PFJ superior / inferior mobilization Grade II-III  IASTM to quad, around incision sites   Quad stretch supine EOT   Heel slides x5 min  Stool scoot x5 laps DL    Thomas participated in neuromuscular re-education activities to improve: Coordination, Kinesthetic, Sense, Proprioception, and Posture for 00 minutes. The following activities were included:    Held:  K-tape quad facilitation  Fwd walking Treadmill x6 min 1.2 MPH 5.0 incline - active quad  Bwd walking Treadmill x6 min 1.2 MPH 5.0 incline - active quad  Lateral espinoza walk x5 laps (high)  Biofeedback quad activation:  LAQ x5 min 10:10 sec on/off  SAQ x5 min 10:10 sec on/off  TKE  ASLR x8 min 10:20 sec on/off      Thomas received cold pack for 10 minutes to to decrease circulation, pain, and swelling.      Home Exercises Provided and Patient Education Provided     Education provided:   - see above    Written Home Exercises Provided: Patient instructed to cont prior HEP.  Exercises were reviewed and Thomas was able to demonstrate them prior to the end of the session.  Thomas demonstrated good  understanding of the education provided.     See EMR under Patient Instructions for exercises provided  1/13/2023 .    Assessment     Patient 8 weeks s/p ACLR.    Demonstrates improved start of flexion ROM and reduced swelling since last visit. Continued with similar  interventions to last visit with improved ROM again to 125 deg and with increased ease.     Needs cont focus on swelling management, LE ROM, and quad activation / strength.    Thomas Is progressing well towards his goals.   Pt prognosis is Excellent.     Pt will continue to benefit from skilled outpatient physical therapy to address the deficits listed in the problem list box on initial evaluation, provide pt/family education and to maximize pt's level of independence in the home and community environment.     Pt's spiritual, cultural and educational needs considered and pt agreeable to plan of care and goals.    Anticipated barriers to physical therapy: none    GOALS: Short Term Goals: 0-3 months  1.Report decreased R knee pain  < / =  1/10  to increase tolerance for amb  2. Increase knee ROM to 3-0-145 in order to be able to perform ADLs without difficulty.  3. Increase strength by 1/3 MMT grade in Quads to increase tolerance for ADL and work activities.  4. Able to amb w/o deviation or complaint  5. Pt to tolerate HEP to improve ROM and independence with ADL's     Long Term Goals: 3-9 months  1. Able to return to running linearly   2. Able to complete [place] return to sport testing  2.Patient goal: Return to [employer]  3.Increase strength to >/= 4+/5 in Quad and hip musculature to increase tolerance for ADL and work activities.  4. Pt will report at CJ level (20-40% impaired) on FOTO knee to demonstrate increase in LE function with every day tasks.     Plan     See POC in treatment section for evaluation; Recommend cont care 2x a week.    YOLETTE MEDINA, PT, DPT, OCS

## 2023-01-31 ENCOUNTER — CLINICAL SUPPORT (OUTPATIENT)
Dept: REHABILITATION | Facility: HOSPITAL | Age: 48
End: 2023-01-31
Payer: COMMERCIAL

## 2023-01-31 DIAGNOSIS — M25.661 DECREASED ROM OF RIGHT KNEE: ICD-10-CM

## 2023-01-31 DIAGNOSIS — M25.561 RIGHT KNEE PAIN, UNSPECIFIED CHRONICITY: Primary | ICD-10-CM

## 2023-01-31 PROCEDURE — 97110 THERAPEUTIC EXERCISES: CPT

## 2023-01-31 PROCEDURE — 97140 MANUAL THERAPY 1/> REGIONS: CPT

## 2023-01-31 NOTE — PROGRESS NOTES
Physical Therapy Daily Treatment Note     Name: Thomas Taveras  Clinic Number: [id number]    Therapy Diagnosis:   No diagnosis found.    Physician: Teodoro Marcus MD    Visit Date: 1/31/2023  Physician Orders: PT Eval and Treat   Medical Diagnosis from Referral: S83.511A (ICD-10-CM) - Complete tear of anterior cruciate ligament of right knee, initial encounter  Evaluation Date: 12/2/2022  Authorization Period Expiration: 12/01/2023  Plan of Care Expiration: 12/01/2023  Visit # / Visits authorized: 20 Total; 9/20     Time In: 1050  Time Out:  1155  Total Appointment Time (timed & untimed codes): 65 min      Precautions: Standard     Procedure:  Right anterior cruciate ligament reconstruction with bone patellar tendon bone autograft    Subjective     Pt reports: Reports increased activity level on Sunday leading to increased knee swelling and soreness.    He was compliant with home exercise program.  Response to previous treatment: HEP complaince  Functional change: brace walking    Pain: 0-3/10  Location: right knee      Objective     Date of surgery: 12/01/2022  POW ~8 weeks 5 days    PROM 1-0-112 deg at start of visit - 1/31/23  PROM 1-0-128 deg at end of visit - 1/31/23  Swelling measurement (at base of patella): 38.5 cm -> 37.5 cm at end of vist     Thomas received therapeutic exercises for 55 min to develop strength, endurance, ROM, flexibility, posture, and core stabilization including:     Patient education:  - swelling management  - avoiding aggravating activities!  - NEW HEP    Supine HS curl SB 3x10  Supine brige with HS curl 3x10  GTB clams 3x burn ea  SL Bridge 3x burn  SLR 10x10 sec hold  Hip thrust 10x10 sec  Hip thrust adduction 10x10 sec  Hip thrust abduction GTB 10x10 sec  Supine knee to chest 3x30 sec   Supine knee across chest 3x30 sec  Heel slides AAROM x5 min      NEXT VISIT - cont knee flexion ROM, HS and quad activation / strength     Held:  Bike x10 min Lvl 5 full [place] for ROM and swelling  management  Heel slides 20x 10 sec hold  Stool scoot x5 laps  HS stretch neutral, R, L 3x30 sec ea  Seated Hamstring Curl Machine 15lb - Dbl leg 2x20 -  5lbs Sgl leg 2x20  BFR 80% 30:15:15:15 for all:  LAQ  Bridge w/ ball btw legs  SLR  Prone HS curl       Thomas received the following manual therapy techniques: Joint mobilizations, Manual Lymphatic Drainage, and Soft tissue Mobilization were applied to the: R knee for 10 minutes, including:    PROM knee flexion/extension  AP mobilization Grade III-IV at various ranges + sustained glides during heel slide  Tibial IR mobilization grade III-IV    Held:  Scar mobilization  PFJ superior / inferior mobilization Grade II-III  IASTM to quad, around incision sites   Quad stretch supine EOT   Heel slides x5 min  Stool scoot x5 laps DL      Thomas received cold pack for 00 minutes to to decrease circulation, pain, and swelling.      Home Exercises Provided and Patient Education Provided     Education provided:   - see above    Written Home Exercises Provided: Patient instructed to cont prior HEP.  Exercises were reviewed and Thomas was able to demonstrate them prior to the end of the session.  Htomas demonstrated good  understanding of the education provided.     See EMR under Patient Instructions for exercises provided  1/31/2023 .    Assessment     Patient 8.5 weeks s/p ACLR.    Return of swelling since last seen due to activity level. Discussed and educated patient in detail on avoiding aggravating activities and importance of reducing swelling in recovery. Patient demonstrated good verbal understanding thu of necessity of this.    Able to reduce swelling and improve ROM significantly with interventions as outlined above within visit. Adjusted HEP accordingly for patient.    Needs cont focus on swelling management thu avoiding aggravating activities, LE ROM, and LE activation / strength.    Thomas Is progressing well towards his goals.   Pt prognosis is Excellent.     Pt will  continue to benefit from skilled outpatient physical therapy to address the deficits listed in the problem list box on initial evaluation, provide pt/family education and to maximize pt's level of independence in the home and community environment.     Pt's spiritual, cultural and educational needs considered and pt agreeable to plan of care and goals.    Anticipated barriers to physical therapy: none    GOALS: Short Term Goals: 0-3 months  1.Report decreased R knee pain  < / =  1/10  to increase tolerance for amb  2. Increase knee ROM to 3-0-145 in order to be able to perform ADLs without difficulty.  3. Increase strength by 1/3 MMT grade in Quads to increase tolerance for ADL and work activities.  4. Able to amb w/o deviation or complaint  5. Pt to tolerate HEP to improve ROM and independence with ADL's     Long Term Goals: 3-9 months  1. Able to return to running linearly   2. Able to complete [place] return to sport testing  2.Patient goal: Return to [employer]  3.Increase strength to >/= 4+/5 in Quad and hip musculature to increase tolerance for ADL and work activities.  4. Pt will report at CJ level (20-40% impaired) on FOTO knee to demonstrate increase in LE function with every day tasks.     Plan     See POC in treatment section for evaluation; Recommend cont care 2x a week.    YOLETTE MEDINA, PT, DPT, OCS

## 2023-02-03 ENCOUNTER — CLINICAL SUPPORT (OUTPATIENT)
Dept: REHABILITATION | Facility: HOSPITAL | Age: 48
End: 2023-02-03
Payer: COMMERCIAL

## 2023-02-03 DIAGNOSIS — M25.561 RIGHT KNEE PAIN, UNSPECIFIED CHRONICITY: Primary | ICD-10-CM

## 2023-02-03 DIAGNOSIS — M25.661 DECREASED ROM OF RIGHT KNEE: ICD-10-CM

## 2023-02-03 PROCEDURE — 97110 THERAPEUTIC EXERCISES: CPT

## 2023-02-03 NOTE — PROGRESS NOTES
Physical Therapy Daily Treatment Note     Name: Thomas Taveras  Clinic Number: [id number]    Therapy Diagnosis:   No diagnosis found.    Physician: Teodoro Marcus MD    Visit Date: 2/3/2023  Physician Orders: PT Eval and Treat   Medical Diagnosis from Referral: S83.511A (ICD-10-CM) - Complete tear of anterior cruciate ligament of right knee, initial encounter  Evaluation Date: 12/2/2022  Authorization Period Expiration: 12/01/2023  Plan of Care Expiration: 12/01/2023  Visit # / Visits authorized: 21 Total; 10/20     Time In: 1300  Time Out:  1400  Total Appointment Time (timed & untimed codes): 60 min      Precautions: Standard     Procedure:  Right anterior cruciate ligament reconstruction with bone patellar tendon bone autograft    Subjective     Pt reports: 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 Has tried to take it as easy as possible.    He was compliant with home exercise program.  Response to previous treatment: HEP complaince  Functional change: brace walking    Pain: 0-3/10  Location: right knee      Objective     Date of surgery: 12/01/2022  POW ~9 weeks 1 days    PROM 1-0-112 deg at start of visit - 1/31/23  PROM 1-0-128 deg at end of visit - 1/31/23  Swelling measurement (at base of patella): 38 cm -> 37.5 cm at end of vist     Post tib 4-/5 on R; 4+/5 on L     Thomas received therapeutic exercises for 60 min to develop strength, endurance, ROM, flexibility, posture, and core stabilization including:     Patient education:  - swelling management  - avoiding aggravating activities!  - add heel raise hold to HEP    Bike x10 min Lvl 5 full [place] for ROM and swelling management  Heel slides AAROM 50x5 sec ea  Supine brige with HS curl 10x5 reps ea  GTB clams 3x burn ea  SL Bridge 3x burn  Post tib YTB 5x30 reps  Heel raise 10x10 sec  Heel raise lateral walk x3 laps  Hip thrust w/ 25 lbs [id number] sec  SLR w/ 3lbs 10x10 sec hold      NEXT VISIT - cont as above      Held:  Supine  knee to chest 3x30 sec   Supine knee across chest 3x30 sec  Stool scoot x5 laps  Seated Hamstring Curl Machine 15lb - Dbl leg 2x20 -  5lbs Sgl leg 2x20  BFR 80% 30:15:15:15 for all:  LAQ  Bridge w/ ball btw legs  SLR  Prone HS curl       Thomas received the following manual therapy techniques: Joint mobilizations, Manual Lymphatic Drainage, and Soft tissue Mobilization were applied to the: R knee for 00 minutes, including:    Held:  PROM knee flexion/extension  AP mobilization Grade III-IV at various ranges + sustained glides during heel slide  Tibial IR mobilization grade III-IV  Scar mobilization  PFJ superior / inferior mobilization Grade II-III  IASTM to quad, around incision sites   Quad stretch supine EOT   Heel slides x5 min  Stool scoot x5 laps DL      Thomas received cold pack for 00 minutes to to decrease circulation, pain, and swelling.      Home Exercises Provided and Patient Education Provided     Education provided:   - see above    Written Home Exercises Provided: Patient instructed to cont prior HEP.  Exercises were reviewed and Thomas was able to demonstrate them prior to the end of the session.  Thomas demonstrated good  understanding of the education provided.     See EMR under Patient Instructions for exercises provided  1/31/2023 .    Assessment     Patient 9 weeks s/p ACLR.    Again return of swelling since last seen due to activity level, which was expected today based on job activities. Patient reports understanding on avoiding aggravating activities going forward.    Able to again reduce swelling and improve ROM significantly with interventions as outlined above within visit. Wayne increased challenge within visit well today too.    Needs cont focus on swelling management thu avoiding aggravating activities, LE ROM, and LE activation / strength.    Thomas Is progressing well towards his goals.   Pt prognosis is Excellent.     Pt will continue to benefit from skilled outpatient physical therapy to  address the deficits listed in the problem list box on initial evaluation, provide pt/family education and to maximize pt's level of independence in the home and community environment.     Pt's spiritual, cultural and educational needs considered and pt agreeable to plan of care and goals.    Anticipated barriers to physical therapy: none    GOALS: Short Term Goals: 0-3 months  1.Report decreased R knee pain  < / =  1/10  to increase tolerance for amb  2. Increase knee ROM to 3-0-145 in order to be able to perform ADLs without difficulty.  3. Increase strength by 1/3 MMT grade in Quads to increase tolerance for ADL and work activities.  4. Able to amb w/o deviation or complaint  5. Pt to tolerate HEP to improve ROM and independence with ADL's     Long Term Goals: 3-9 months  1. Able to return to running linearly   2. Able to complete [place] return to sport testing  2.Patient goal: Return to [employer]  3.Increase strength to >/= 4+/5 in Quad and hip musculature to increase tolerance for ADL and work activities.  4. Pt will report at CJ level (20-40% impaired) on FOTO knee to demonstrate increase in LE function with every day tasks.     Plan     See POC in treatment section for evaluation; Recommend cont care 2x a week.    YOLETTE MEDINA, PT, DPT, OCS

## 2023-02-08 ENCOUNTER — CLINICAL SUPPORT (OUTPATIENT)
Dept: REHABILITATION | Facility: HOSPITAL | Age: 48
End: 2023-02-08
Payer: COMMERCIAL

## 2023-02-08 DIAGNOSIS — M25.661 DECREASED ROM OF RIGHT KNEE: ICD-10-CM

## 2023-02-08 DIAGNOSIS — M25.561 RIGHT KNEE PAIN, UNSPECIFIED CHRONICITY: Primary | ICD-10-CM

## 2023-02-08 PROCEDURE — 97140 MANUAL THERAPY 1/> REGIONS: CPT

## 2023-02-08 PROCEDURE — 97110 THERAPEUTIC EXERCISES: CPT

## 2023-02-08 NOTE — PROGRESS NOTES
Physical Therapy Daily Treatment Note     Name: Thomas Taveras  Clinic Number: [id number]    Therapy Diagnosis:   No diagnosis found.    Physician: Teodoro Marcus MD    Visit Date: 2/8/2023  Physician Orders: PT Eval and Treat   Medical Diagnosis from Referral: S83.511A (ICD-10-CM) - Complete tear of anterior cruciate ligament of right knee, initial encounter  Evaluation Date: 12/2/2022  Authorization Period Expiration: 12/01/2023  Plan of Care Expiration: 12/01/2023  Visit # / Visits authorized: 22 Total; 11/20     Time In: 1250  Time Out:  205  Total Appointment Time (timed & untimed codes): 75 min      Precautions: Standard     Procedure:  Right anterior cruciate ligament reconstruction with bone patellar tendon bone autograft    Subjective     Pt reports: that his activity outside PT is still similar with work and walking. Pt reports that his swelling feels similar to last session.    He was compliant with home exercise program.  Response to previous treatment: HEP complaince  Functional change: brace walking    Pain: 0-3/10  Location: right knee      Objective     Date of surgery: 12/01/2022  POW ~9 weeks 6 days    PROM 0-0-115 deg at start of visit - 2/8/23  PROM 0-0-125 deg at end of visit - 2/8/23  Swelling measurement (at base of patella): 38 cm -> 37.5 cm at end of vist     Post tib 4-/5 on R; 4+/5 on L     Thomas received therapeutic exercises for 55 min to develop strength, endurance, ROM, flexibility, posture, and core stabilization including:     Patient education:  - swelling management  - avoiding aggravating activities!  - add heel raise hold to [facility]    Bike x10 min Lvl 5 full [place] for ROM and swelling management  Heel slides AAROM 50x5 sec ea  Supine brige with HS curl 2x10 reps ea  GTB clams 3x burn ea  SL Bridge 3x burn ea  Heel raise lateral walk x3 laps + 3 laps with YTB  Seated Hamstring Curl Machine - Dbl leg 15lb 1x20, 20lb 2x10 -  10lbs Sgl leg 2x10    BFR 80% 30:15:15:15 for all:  SLR  2#  Prone HS curl   Shuttle Press 0.5b      NEXT VISIT - cont as above      Held:  Supine knee to chest 3x30 sec   Supine knee across chest 3x30 sec  Stool scoot x5 laps  Post tib YTB 5x30 reps  Hip thrust w/ 25 lbs [id number] sec  SLR w/ 3lbs 10x10 sec hold  Heel raise 10x10 sec           Thomas received the following manual therapy techniques: Joint mobilizations, Manual Lymphatic Drainage, and Soft tissue Mobilization were applied to the: R knee for 10 minutes, including:    PROM knee flexion/extension  AP mobilization Grade III-IV at various ranges + sustained glides during heel slide  Tibial IR mobilization grade III-IV    Held:  Scar mobilization  PFJ superior / inferior mobilization Grade II-III  IASTM to quad, around incision sites   Quad stretch supine EOT   Heel slides x5 min  Stool scoot x5 laps DL      Thomas received cold pack for 00 minutes to to decrease circulation, pain, and swelling.      Home Exercises Provided and Patient Education Provided     Education provided:   - see above    Written Home Exercises Provided: Patient instructed to cont prior HEP.  Exercises were reviewed and Thomas was able to demonstrate them prior to the end of the session.  Thomas demonstrated good  understanding of the education provided.     See EMR under Patient Instructions for exercises provided  1/31/2023 .    Assessment   Patient 10 weeks s/p ACLR.    Pt continues to present with knee swelling following increased activity that is reduced following interventions and allows improvements in ROM significantly. Patient reports understanding on avoiding aggravating activities going forward.    Pt progressed with hamstring/quadriceps strengthening today and tolerated it well with maintenance of ROM at end of session.    Needs cont focus on swelling management thu avoiding aggravating activities, LE ROM, and LE activation / strength.    Thomas Is progressing well towards his goals.   Pt prognosis is Excellent.     Pt will continue  to benefit from skilled outpatient physical therapy to address the deficits listed in the problem list box on initial evaluation, provide pt/family education and to maximize pt's level of independence in the home and community environment.     Pt's spiritual, cultural and educational needs considered and pt agreeable to plan of care and goals.    Anticipated barriers to physical therapy: none    GOALS: Short Term Goals: 0-3 months  1.Report decreased R knee pain  < / =  1/10  to increase tolerance for amb  2. Increase knee ROM to 3-0-145 in order to be able to perform ADLs without difficulty.  3. Increase strength by 1/3 MMT grade in Quads to increase tolerance for ADL and work activities.  4. Able to amb w/o deviation or complaint  5. Pt to tolerate HEP to improve ROM and independence with ADL's     Long Term Goals: 3-9 months  1. Able to return to running linearly   2. Able to complete [place] return to sport testing  2.Patient goal: Return to [employer]  3.Increase strength to >/= 4+/5 in Quad and hip musculature to increase tolerance for ADL and work activities.  4. Pt will report at CJ level (20-40% impaired) on FOTO knee to demonstrate increase in LE function with every day tasks.     Plan     See POC in treatment section for evaluation; Recommend cont care 2x a week.    Adin Olson, SPT, DPT    I certify that I was present in the room directing the student in service delivery and guiding them using my skilled judgment. As the co-signing therapist I have reviewed the students documentation and am responsible for the treatment, assessment, and plan.     Teodoro Chisholm, PT, DPT, OCS

## 2023-02-10 ENCOUNTER — CLINICAL SUPPORT (OUTPATIENT)
Dept: REHABILITATION | Facility: HOSPITAL | Age: 48
End: 2023-02-10
Payer: COMMERCIAL

## 2023-02-10 DIAGNOSIS — M25.661 DECREASED ROM OF RIGHT KNEE: ICD-10-CM

## 2023-02-10 DIAGNOSIS — M25.561 RIGHT KNEE PAIN, UNSPECIFIED CHRONICITY: Primary | ICD-10-CM

## 2023-02-10 PROCEDURE — 97110 THERAPEUTIC EXERCISES: CPT

## 2023-02-10 PROCEDURE — 97112 NEUROMUSCULAR REEDUCATION: CPT

## 2023-02-10 NOTE — PROGRESS NOTES
Physical Therapy Daily Treatment Note     Name: Thomas Taveras  Clinic Number: [id number]    Therapy Diagnosis:   Encounter Diagnoses   Name Primary?    Right knee pain, unspecified chronicity Yes    Decreased ROM of right knee        Physician: Toedoro Marcus MD    Visit Date: 2/10/2023  Physician Orders: PT Eval and Treat   Medical Diagnosis from Referral: S83.511A (ICD-10-CM) - Complete tear of anterior cruciate ligament of right knee, initial encounter  Evaluation Date: 12/2/2022  Authorization Period Expiration: 12/01/2023  Plan of Care Expiration: 12/01/2023  Visit # / Visits authorized: 23 Total; 12/20     Time In: 1300  Time Out:  1400  Total Appointment Time (timed & untimed codes): 60 min      Precautions: Standard     Procedure:  Right anterior cruciate ligament reconstruction with bone patellar tendon bone autograft    Subjective     Pt reports: Has been sitting more frequently to catch up on work. No other complaints.    He was compliant with home exercise program.  Response to previous treatment: HEP complaince  Functional change: ADLs w/ min complaint and some swelling    Pain: 0-3/10  Location: right knee      Objective     Date of surgery: 12/01/2022  POW ~10 weeks 1 days - as of 2/10/2023    PROM 0-0-122 deg at start of visit - 2/10/23  PROM 0-0-125 deg at end of visit - 2/8/23  Swelling measurement (at base of patella): 38.5 cm    Post tib 4/5 on R; 4+/5 on L     Thomas received therapeutic exercises for 35 min to develop strength, endurance, ROM, flexibility, posture, and core stabilization including:     Patient education:  - swelling management  - avoiding aggravating activities!  - HEP adjustments    Bike x8 min Lvl 5 full [place] for ROM and swelling management    ASLR w/ 0lbs 5x10 sec hold  ASLR w/ 5lbs 5x10 sec hold  ASLR w/ ER 5x10 sec hold    Heel raise lateral walk x4 laps  GTB sidesteps x4 laps (at ankles)  Wall sit 3x30 sec - foot position   Hip thrust w/ 30 lbs 10x10 sec  SL hip  thrust 5x10 sec ea      NEXT VISIT - cont as above      Held:  Post tib YTB 5x30 reps  Heel slides AAROM 50x5 sec ea  Supine brige with HS curl 2x10 reps ea  GTB clams 3x burn ea  SL Bridge 3x burn ea  Seated Hamstring Curl Machine - Dbl leg 15lb 1x20, 20lb 2x10 -  10lbs Sgl leg 2x10  BFR 80% 30:15:15:15 for all:  SLR 2#  Prone HS curl   Shuttle Press 0.5b         Thomas received the following manual therapy techniques: Joint mobilizations, Manual Lymphatic Drainage, and Soft tissue Mobilization were applied to the: R knee for 00 minutes, including:    Held:  Tibial IR mobilization grade III-IV      Thomas participated in neuromuscular re-education activities to improve: Balance, Coordination, Kinesthetic, Sense, Proprioception, and Posture for 25 minutes. The following activities were included:    For hip dominance / foot proprioception:  DL RDL 10lbs KB x10 ea normal, ovals, box pad, round pad, BOSU  SL RDL 10lbs KB 2x10 ea  Bwd 6 in toe taps 10lbs KB 2x10 ea        Home Exercises Provided and Patient Education Provided     Education provided:   - see above    Written Home Exercises Provided: Patient instructed to cont prior HEP.  Exercises were reviewed and Thomas was able to demonstrate them prior to the end of the session.  Thomas demonstrated good  understanding of the education provided.     See EMR under Patient Instructions for exercises provided  2/10/2023 .     Assessment     Patient 10 weeks s/p ACLR.    Pt continues to present with knee swelling following likely from prolonged dependent positions at work today. ROM into flexion improving at start of visits over time.     Wayne all interventions / progressions well within visit and with good challenge thu to balance and glute strength. Patient loads excessively on outside of foot of R LE for stability; improved foot loading mechanics when balance challenged today during RDLs. HEP adjusted for progressions to address impairments in strength and  proprioception.    Needs cont focus on swelling management thu avoiding aggravating activities, LE ROM, and LE activation / strength.    Thomas Is progressing well towards his goals.   Pt prognosis is Excellent.     Pt will continue to benefit from skilled outpatient physical therapy to address the deficits listed in the problem list box on initial evaluation, provide pt/family education and to maximize pt's level of independence in the home and community environment.     Pt's spiritual, cultural and educational needs considered and pt agreeable to plan of care and goals.    Anticipated barriers to physical therapy: none    GOALS: Short Term Goals: 0-3 months  1.Report decreased R knee pain  < / =  1/10  to increase tolerance for amb  2. Increase knee ROM to 3-0-145 in order to be able to perform ADLs without difficulty.  3. Increase strength by 1/3 MMT grade in Quads to increase tolerance for ADL and work activities.  4. Able to amb w/o deviation or complaint  5. Pt to tolerate HEP to improve ROM and independence with ADL's     Long Term Goals: 3-9 months  1. Able to return to running linearly   2. Able to complete [place] return to sport testing  2.Patient goal: Return to [employer]  3.Increase strength to >/= 4+/5 in Quad and hip musculature to increase tolerance for ADL and work activities.  4. Pt will report at CJ level (20-40% impaired) on FOTO knee to demonstrate increase in LE function with every day tasks.     Plan     See POC in treatment section for evaluation; Recommend cont care 2x a week.    YOLETTE MEDINA, PT, DPT, OCS

## 2023-02-15 ENCOUNTER — CLINICAL SUPPORT (OUTPATIENT)
Dept: REHABILITATION | Facility: HOSPITAL | Age: 48
End: 2023-02-15
Payer: COMMERCIAL

## 2023-02-15 DIAGNOSIS — M25.661 DECREASED ROM OF RIGHT KNEE: ICD-10-CM

## 2023-02-15 DIAGNOSIS — M25.561 RIGHT KNEE PAIN, UNSPECIFIED CHRONICITY: Primary | ICD-10-CM

## 2023-02-15 PROCEDURE — 97112 NEUROMUSCULAR REEDUCATION: CPT

## 2023-02-15 PROCEDURE — 97110 THERAPEUTIC EXERCISES: CPT

## 2023-02-15 NOTE — PROGRESS NOTES
Physical Therapy Daily Treatment Note     Name: Thomas Taveras  Clinic Number: [id number]    Therapy Diagnosis:   No diagnosis found.      Physician: Teodoro Marcus MD    Visit Date: 2/15/2023  Physician Orders: PT Eval and Treat   Medical Diagnosis from Referral: S83.511A (ICD-10-CM) - Complete tear of anterior cruciate ligament of right knee, initial encounter  Evaluation Date: 12/2/2022  Authorization Period Expiration: 12/01/2023  Plan of Care Expiration: 12/01/2023  Visit # / Visits authorized: 24 Total; 13/20     Time In: 1252  Time Out: 200  Total Appointment Time (timed & untimed codes): 68 min      Precautions: Standard     Procedure:  Right anterior cruciate ligament reconstruction with bone patellar tendon bone autograft    Subjective     Pt reports: that his knee has been feeling better with less discomfort after sitting all day for work.    He was compliant with home exercise program.  Response to previous treatment: HEP complaince  Functional change: ADLs w/ min complaint and some swelling    Pain: 0-3/10  Location: right knee      Objective     Date of surgery: 12/01/2022  POW ~10 weeks 6 days - as of 2/15/2023    PROM 0-0-125 deg at start of visit - 2/15/23  PROM 0-0-125 deg at end of visit - 2/8/23  Swelling measurement (at base of patella): 38.5 cm NT     Thomas received therapeutic exercises for 40 min to develop strength, endurance, ROM, flexibility, posture, and core stabilization including:     Patient education:  - swelling management  - avoiding aggravating activities!    Bike x8 min Lvl 5 full [place] for ROM and swelling management  ASLR w/ 5lbs 20x 10 sec hold  GTB sidesteps x4 laps (at ankles)  Wall sit 3x30 sec - foot position   Hip thrust w/ 35 lbs 2x15 x10 sec  SL hip thrust 5x10 sec ea  Seated Hamstring Curl Machine - 10lbs Sgl leg 3x10  Shuttle Squat Single Leg 2b 3x10    NEXT VISIT - cont as above      Held:  Post tib YTB 5x30 reps  Heel slides AAROM 50x5 sec ea  Supine brige with  HS curl 2x10 reps ea  GTB clams 3x burn ea  SL Bridge 3x burn ea    BFR 80% 30:15:15:15 for all:  SLR 2#  Prone HS curl   Shuttle Press 0.5b  Heel raise lateral walk x4 laps       Thomas received the following manual therapy techniques: Joint mobilizations, Manual Lymphatic Drainage, and Soft tissue Mobilization were applied to the: R knee for 00 minutes, including:    Held:  Tibial IR mobilization grade III-IV      Thomas participated in neuromuscular re-education activities to improve: Balance, Coordination, Kinesthetic, Sense, Proprioception, and Posture for 28 minutes. The following activities were included:    For hip dominance / foot proprioception:  SL RDL 10lbs KB 2x10 ea  Bwd 4 in toe taps 3x10 ea  Fwd 4 in toe taps 3x10 ea      Home Exercises Provided and Patient Education Provided     Education provided:   - see above    Written Home Exercises Provided: Patient instructed to cont prior HEP.  Exercises were reviewed and Thomas was able to demonstrate them prior to the end of the session.  Thomas demonstrated good  understanding of the education provided.     See EMR under Patient Instructions for exercises provided  2/10/2023 .     Assessment     Patient ~10 weeks s/p ACLR.    Pt continues to present with knee swelling following likely from prolonged dependent positions at work today. ROM into flexion continues to improve at start of session.    Wayne all interventions / progressions well within visit and with good challenge thu to balance and glute strength. Patient continues to load excessively on outside of foot of R LE for stability; but is demonstrating improvements with single limb CKC mvmts. Pt exhibited greatest quad activation with fwd step down.    Needs cont focus on swelling management thu avoiding aggravating activities, LE ROM, and LE activation / strength.    Thomas Is progressing well towards his goals.   Pt prognosis is Excellent.     Pt will continue to benefit from skilled outpatient physical  therapy to address the deficits listed in the problem list box on initial evaluation, provide pt/family education and to maximize pt's level of independence in the home and community environment.     Pt's spiritual, cultural and educational needs considered and pt agreeable to plan of care and goals.    Anticipated barriers to physical therapy: none    GOALS: Short Term Goals: 0-3 months  1.Report decreased R knee pain  < / =  1/10  to increase tolerance for amb  2. Increase knee ROM to 3-0-145 in order to be able to perform ADLs without difficulty.  3. Increase strength by 1/3 MMT grade in Quads to increase tolerance for ADL and work activities.  4. Able to amb w/o deviation or complaint  5. Pt to tolerate HEP to improve ROM and independence with ADL's     Long Term Goals: 3-9 months  1. Able to return to running linearly   2. Able to complete [place] return to sport testing  2.Patient goal: Return to [employer]  3.Increase strength to >/= 4+/5 in Quad and hip musculature to increase tolerance for ADL and work activities.  4. Pt will report at CJ level (20-40% impaired) on FOTO knee to demonstrate increase in LE function with every day tasks.     Plan     See POC in treatment section for evaluation; Recommend cont care 2x a week.    Adin Olson, SPT, DPT, OCS

## 2023-02-17 ENCOUNTER — CLINICAL SUPPORT (OUTPATIENT)
Dept: REHABILITATION | Facility: HOSPITAL | Age: 48
End: 2023-02-17
Payer: COMMERCIAL

## 2023-02-17 DIAGNOSIS — M25.561 RIGHT KNEE PAIN, UNSPECIFIED CHRONICITY: Primary | ICD-10-CM

## 2023-02-17 DIAGNOSIS — M25.661 DECREASED ROM OF RIGHT KNEE: ICD-10-CM

## 2023-02-17 PROCEDURE — 97112 NEUROMUSCULAR REEDUCATION: CPT

## 2023-02-17 PROCEDURE — 97110 THERAPEUTIC EXERCISES: CPT

## 2023-02-17 NOTE — PROGRESS NOTES
Physical Therapy Daily Treatment Note     Name: Thomas Taveras  Clinic Number: [id number]    Therapy Diagnosis:   Encounter Diagnoses   Name Primary?    Right knee pain, unspecified chronicity Yes    Decreased ROM of right knee      Physician: Teodoro Marcus MD    Visit Date: 2/17/2023  Physician Orders: PT Eval and Treat   Medical Diagnosis from Referral: S83.511A (ICD-10-CM) - Complete tear of anterior cruciate ligament of right knee, initial encounter  Evaluation Date: 12/2/2022  Authorization Period Expiration: 12/01/2023  Plan of Care Expiration: 12/01/2023  Visit # / Visits authorized: 25 Total; 14/20     Time In: 1250  Time Out: 200  Total Appointment Time (timed & untimed codes): 70 min      Precautions: Standard     Procedure:  Right anterior cruciate ligament reconstruction with bone patellar tendon bone autograft    Subjective     Pt reports: that his knee has been feeling better with less discomfort after sitting all day for work.    FOTO IE: 52  FOTO 2/17/2023: 61    He was compliant with home exercise program.  Response to previous treatment: HEP complaince  Functional change: ADLs w/ min complaint and some swelling    Pain: 0-3/10  Location: right knee      Objective     Date of surgery: 12/01/2022  POW ~11 weeks 1 days - as of 2/17/2023    PROM 0-0-125 deg at start of visit - 2/17/23  PROM 0-0-125 deg at end of visit - 2/8/23  Swelling measurement (at base of patella): 38.5 cm NT     Thomas received therapeutic exercises for 40 min to develop strength, endurance, ROM, flexibility, posture, and core stabilization including:     Patient education:  - swelling management  - avoiding aggravating activities!    Bike x8 min Lvl 5 full [place] for ROM and swelling management  ASLR w/ 5lbs 25x 10 sec hold  GTB sidesteps x3 laps (at ankles)  Hip thrust w/ 35 lbs 3x15 x10 sec  SL hip thrust 5x10 sec ea  Sled Push/Pull 45lb 1 lap, 70lb 2 laps  Shuttle Squat Single Leg 2b 3x10  Heel raise lateral walk with YTB  x4 laps      NEXT VISIT - cont as above      Held:  Post tib YTB 5x30 reps  Heel slides AAROM 50x5 sec ea  Supine brige with HS curl 2x10 reps ea  GTB clams 3x burn ea  SL Bridge 3x burn ea  Wall sit 3x30 sec - foot position   Seated Hamstring Curl Machine - 10lbs Sgl leg 3x10    BFR 80% 30:15:15:15 for all:  SLR 2#  Prone HS curl   Shuttle Press 0.5b         Thomas received the following manual therapy techniques: Joint mobilizations, Manual Lymphatic Drainage, and Soft tissue Mobilization were applied to the: R knee for 05 minutes, including:    Held:  Tibial IR mobilization grade III-IV      Thomas participated in neuromuscular re-education activities to improve: Balance, Coordination, Kinesthetic, Sense, Proprioception, and Posture for 25 minutes. The following activities were included:    For hip dominance / foot proprioception:  SL RDL 10lbs KB 3x10 ea  Bwd 5 in toe taps 3x10 ea with 10lb KB  Lat 5 in toe taps 3x10 ea with stick for min assist in balance  Step Up 3 in with blue TKE 20x - pt responded well    Home Exercises Provided and Patient Education Provided     Education provided:   - see above    Written Home Exercises Provided: Patient instructed to cont prior HEP.  Exercises were reviewed and Thomas was able to demonstrate them prior to the end of the session.  Thomas demonstrated good  understanding of the education provided.     See EMR under Patient Instructions for exercises provided  2/10/2023 .     Assessment   Patient ~11 weeks s/p ACLR.    Pt reports less swelling and less discomfort thru out the day with improved FOTO score measured today.    Wayne all interventions and volume progressions to challenge glute/hip + quadriceps strength. Patient continues to demonstrate improvements with single limb CKC mvmts. Pt exhibited greatest quad activation with step up using blue TKE for cue. Pt reported improved feelings of his quad activating when walking at end of session.    Needs cont focus on swelling  management thu avoiding aggravating activities, LE ROM, and LE activation / strength.    Thomas Is progressing well towards his goals.   Pt prognosis is Excellent.     Pt will continue to benefit from skilled outpatient physical therapy to address the deficits listed in the problem list box on initial evaluation, provide pt/family education and to maximize pt's level of independence in the home and community environment.     Pt's spiritual, cultural and educational needs considered and pt agreeable to plan of care and goals.    Anticipated barriers to physical therapy: none    GOALS: Short Term Goals: 0-3 months  1.Report decreased R knee pain  < / =  1/10  to increase tolerance for amb  2. Increase knee ROM to 3-0-145 in order to be able to perform ADLs without difficulty.  3. Increase strength by 1/3 MMT grade in Quads to increase tolerance for ADL and work activities.  4. Able to amb w/o deviation or complaint  5. Pt to tolerate HEP to improve ROM and independence with ADL's     Long Term Goals: 3-9 months  1. Able to return to running linearly   2. Able to complete [place] return to sport testing  2.Patient goal: [facility] to [employer] [facility] [employer] [facility]  3.Increase strength to >/= 4+/5 in Quad and hip musculature to increase tolerance for ADL and work activities.  4. Pt will report at CJ level (20-40% impaired) on FOTO knee to demonstrate increase in LE function with every day tasks.     Plan     See POC in treatment section for evaluation; Recommend cont care 2x a week.    Adin Olson, SPT    I certify that I was present in the room directing the student in service delivery and guiding them using my skilled judgment. As the co-signing therapist I have reviewed the students documentation and am responsible for the treatment, assessment, and plan.     Teodoro Chisholm, PT, DPT, OCS

## 2023-02-22 ENCOUNTER — CLINICAL SUPPORT (OUTPATIENT)
Dept: REHABILITATION | Facility: HOSPITAL | Age: 48
End: 2023-02-22
Payer: COMMERCIAL

## 2023-02-22 ENCOUNTER — OFFICE VISIT (OUTPATIENT)
Dept: ORTHOPEDICS | Facility: CLINIC | Age: 48
End: 2023-02-22
Payer: COMMERCIAL

## 2023-02-22 VITALS
WEIGHT: 162.69 LBS | HEIGHT: 68 IN | DIASTOLIC BLOOD PRESSURE: 76 MMHG | HEART RATE: 80 BPM | SYSTOLIC BLOOD PRESSURE: 121 MMHG | BODY MASS INDEX: 24.66 KG/M2

## 2023-02-22 DIAGNOSIS — M25.561 RIGHT KNEE PAIN, UNSPECIFIED CHRONICITY: Primary | ICD-10-CM

## 2023-02-22 DIAGNOSIS — Z98.890 S/P ACL RECONSTRUCTION: Primary | ICD-10-CM

## 2023-02-22 DIAGNOSIS — M25.661 DECREASED ROM OF RIGHT KNEE: ICD-10-CM

## 2023-02-22 PROCEDURE — 97112 NEUROMUSCULAR REEDUCATION: CPT

## 2023-02-22 PROCEDURE — 99999 PR PBB SHADOW E&M-EST. PATIENT-LVL III: CPT | Mod: PBBFAC,,, | Performed by: ORTHOPAEDIC SURGERY

## 2023-02-22 PROCEDURE — 99999 PR PBB SHADOW E&M-EST. PATIENT-LVL III: ICD-10-PCS | Mod: PBBFAC,,, | Performed by: ORTHOPAEDIC SURGERY

## 2023-02-22 PROCEDURE — 3008F BODY MASS INDEX DOCD: CPT | Mod: CPTII,S$GLB,, | Performed by: ORTHOPAEDIC SURGERY

## 2023-02-22 PROCEDURE — 99024 PR POST-OP FOLLOW-UP VISIT: ICD-10-PCS | Mod: S$GLB,,, | Performed by: ORTHOPAEDIC SURGERY

## 2023-02-22 PROCEDURE — 99024 POSTOP FOLLOW-UP VISIT: CPT | Mod: S$GLB,,, | Performed by: ORTHOPAEDIC SURGERY

## 2023-02-22 PROCEDURE — 3008F PR BODY MASS INDEX (BMI) DOCUMENTED: ICD-10-PCS | Mod: CPTII,S$GLB,, | Performed by: ORTHOPAEDIC SURGERY

## 2023-02-22 PROCEDURE — 1159F PR MEDICATION LIST DOCUMENTED IN MEDICAL RECORD: ICD-10-PCS | Mod: CPTII,S$GLB,, | Performed by: ORTHOPAEDIC SURGERY

## 2023-02-22 PROCEDURE — 97110 THERAPEUTIC EXERCISES: CPT

## 2023-02-22 PROCEDURE — 3078F PR MOST RECENT DIASTOLIC BLOOD PRESSURE < 80 MM HG: ICD-10-PCS | Mod: CPTII,S$GLB,, | Performed by: ORTHOPAEDIC SURGERY

## 2023-02-22 PROCEDURE — 1159F MED LIST DOCD IN RCRD: CPT | Mod: CPTII,S$GLB,, | Performed by: ORTHOPAEDIC SURGERY

## 2023-02-22 PROCEDURE — 3074F SYST BP LT 130 MM HG: CPT | Mod: CPTII,S$GLB,, | Performed by: ORTHOPAEDIC SURGERY

## 2023-02-22 PROCEDURE — 3074F PR MOST RECENT SYSTOLIC BLOOD PRESSURE < 130 MM HG: ICD-10-PCS | Mod: CPTII,S$GLB,, | Performed by: ORTHOPAEDIC SURGERY

## 2023-02-22 PROCEDURE — 1160F RVW MEDS BY RX/DR IN RCRD: CPT | Mod: CPTII,S$GLB,, | Performed by: ORTHOPAEDIC SURGERY

## 2023-02-22 PROCEDURE — 1160F PR REVIEW ALL MEDS BY PRESCRIBER/CLIN PHARMACIST DOCUMENTED: ICD-10-PCS | Mod: CPTII,S$GLB,, | Performed by: ORTHOPAEDIC SURGERY

## 2023-02-22 PROCEDURE — 3078F DIAST BP <80 MM HG: CPT | Mod: CPTII,S$GLB,, | Performed by: ORTHOPAEDIC SURGERY

## 2023-02-22 NOTE — PROGRESS NOTES
Physical Therapy Daily Treatment Note     Name: Thomas Taveras  Clinic Number: [id number]    Therapy Diagnosis:   Encounter Diagnoses   Name Primary?    Right knee pain, unspecified chronicity Yes    Decreased ROM of right knee      Physician: Teodoro Marcus MD    Visit Date: 2/22/2023  Physician Orders: PT Eval and Treat   Medical Diagnosis from Referral: S83.511A (ICD-10-CM) - Complete tear of anterior cruciate ligament of right knee, initial encounter  Evaluation Date: 12/2/2022  Authorization Period Expiration: 12/01/2023  Plan of Care Expiration: 12/01/2023  Visit # / Visits authorized: 26 Total; 15/20     Time In: 1300  Time Out: 1400  Total Appointment Time (timed & untimed codes): 60 min      Precautions: Standard     Procedure:  Right anterior cruciate ligament reconstruction with bone patellar tendon bone autograft    Subjective     Pt reports: Had FU with MD and has his knee drained. Took it easy over [place] to avoid over working leg.    FOTO IE: 52  FOTO 2/17/2023: 61    He was compliant with home exercise program.  Response to previous treatment: HEP complaince  Functional change: ADLs w/ min complaint and some swelling    Pain: 0-3/10  Location: right knee      Objective     Date of surgery: 12/01/2022  POW ~11 weeks 6 days - as of 2/22/2023     PROM 0-0-125 deg at start of visit - 2/17/23  PROM 0-0-125 deg at end of visit - 2/8/23  Swelling measurement (at base of patella): 38.5 cm NT     Thomas received therapeutic exercises for 25 min to develop strength, endurance, ROM, flexibility, posture, and core stabilization including:     Patient education:  - swelling management  - avoiding aggravating activities!    Elliptical bwd x6 min Lvl 0   Heel slides 30x5 sec  SL hip thrust 3xReps fail  GTB sidesteps x3 laps (at ankles)      NEXT VISIT - Squats: cont to progress stability and strength per jina      Held:  Bike x8 min Lvl 5 full [place] for ROM and swelling management  Hip thrust w/ 35 lbs 3x15  x10 sec  Sled Push/Pull 45lb 1 lap, 70lb 2 laps  Shuttle Squat Single Leg 2b 3x10  Heel raise lateral walk with YTB x4 laps  ASLR w/ 5lbs 25x 10 sec hold  Post tib YTB 5x30 reps  Heel slides AAROM 50x5 sec ea  Supine brige with HS curl 2x10 reps ea  GTB clams 3x burn ea  SL Bridge 3x burn ea  Wall sit 3x30 sec - foot position   Seated Hamstring Curl Machine - 10lbs Sgl leg 3x10  BFR 80% 30:15:15:15 for all:  SLR 2#  Prone HS curl   Shuttle Press 0.5b     Thomas received the following manual therapy techniques: Joint mobilizations, Manual Lymphatic Drainage, and Soft tissue Mobilization were applied to the: R knee for 00 minutes, including:    Held:  Tibial IR mobilization grade III-IV      Thomas participated in neuromuscular re-education activities to improve: Balance, Coordination, Kinesthetic, Sense, Proprioception, and Posture for 35 minutes. The following activities were included:    For hip dominance / foot proprioception:  Bridge hold 5xHold fail  GTB standing clam 2x5x10 sec  SL RDL 10lbs KB 3x15 ea  Eccentric bulgarians 3x5 reps ea - 5 sec count down / up      Held:  Bwd 5 in toe taps 3x10 ea with 10lb KB  Lat 5 in toe taps 3x10 ea with stick for min assist in balance  Step Up 3 in with blue TKE 20x - pt responded well    Home Exercises Provided and Patient Education Provided     Education provided:   - see above    Written Home Exercises Provided: Patient instructed to cont prior HEP.  Exercises were reviewed and Thomas was able to demonstrate them prior to the end of the session.  Thomas demonstrated good  understanding of the education provided.     See EMR under Patient Instructions for exercises provided  2/10/2023 .     Assessment     Patient ~12 weeks s/p ACLR.    Demonstrated good challenge to proprioception and muscular strength within visit thu eccentric French squat.    Progressing as expected. Cont to focus on loading and proprioception within jina.    Thomas Is progressing well towards his goals.    Pt prognosis is Excellent.     Pt will continue to benefit from skilled outpatient physical therapy to address the deficits listed in the problem list box on initial evaluation, provide pt/family education and to maximize pt's level of independence in the home and community environment.     Pt's spiritual, cultural and educational needs considered and pt agreeable to plan of care and goals.    Anticipated barriers to physical therapy: none    GOALS: Short Term Goals: 0-3 months  1.Report decreased R knee pain  < / =  1/10  to increase tolerance for amb  2. Increase knee ROM to 3-0-145 in order to be able to perform ADLs without difficulty.  3. Increase strength by 1/3 MMT grade in Quads to increase tolerance for ADL and work activities.  4. Able to amb w/o deviation or complaint  5. Pt to tolerate HEP to improve ROM and independence with ADL's     Long Term Goals: 3-9 months  1. Able to return to running linearly   2. Able to complete [place] return to sport testing  2.Patient goal: [facility]  3.Increase strength to >/= 4+/5 in Quad and hip musculature to increase tolerance for ADL and work activities.  4. Pt will report at CJ level (20-40% impaired) on FOTO knee to demonstrate increase in LE function with every day tasks.     Plan     See POC in treatment section for evaluation; Recommend cont care 2x a week.    YOLETTE MEDINA, PT, DPT, OCS

## 2023-02-24 ENCOUNTER — CLINICAL SUPPORT (OUTPATIENT)
Dept: REHABILITATION | Facility: HOSPITAL | Age: 48
End: 2023-02-24
Payer: COMMERCIAL

## 2023-02-24 DIAGNOSIS — M25.661 DECREASED ROM OF RIGHT KNEE: ICD-10-CM

## 2023-02-24 DIAGNOSIS — M25.561 RIGHT KNEE PAIN, UNSPECIFIED CHRONICITY: Primary | ICD-10-CM

## 2023-02-24 PROCEDURE — 97110 THERAPEUTIC EXERCISES: CPT

## 2023-02-24 PROCEDURE — 97112 NEUROMUSCULAR REEDUCATION: CPT

## 2023-02-24 NOTE — PROGRESS NOTES
Physical Therapy Daily Treatment Note     Name: Thomas Taveras  Clinic Number: [id number]    Therapy Diagnosis:   Encounter Diagnoses   Name Primary?    Right knee pain, unspecified chronicity Yes    Decreased ROM of right knee        Physician: Teodoro Marcus MD    Visit Date: 2/24/2023  Physician Orders: PT Eval and Treat   Medical Diagnosis from Referral: S83.511A (ICD-10-CM) - Complete tear of anterior cruciate ligament of right knee, initial encounter  Evaluation Date: 12/2/2022  Authorization Period Expiration: 12/01/2023  Plan of Care Expiration: 12/01/2023  Visit # / Visits authorized: 26 Total; 16/20     Time In: 100  Time Out: 200  Total Appointment Time (timed & untimed codes): 60 min      Precautions: Standard     Procedure:  Right anterior cruciate ligament reconstruction with bone patellar tendon bone autograft    Subjective     Pt reports: After knee drained and he feels his knee is moving a lot better. Pt states he does not feel like he is limping as much.    FOTO IE: 52  FOTO 2/17/2023: 61    He was compliant with home exercise program.  Response to previous treatment: HEP complaince  Functional change: ADLs w/ min complaint and some swelling    Pain: 0-3/10  Location: right knee      Objective     Date of surgery: 12/01/2022  POW ~12 weeks 1 days - as of 2/24/2023     PROM 0-0-125 deg at start of visit - 2/24/23  PROM 0-0-125 deg at end of visit - 2/8/23  Swelling measurement (at base of patella): 38.5 cm NT     Thomas received therapeutic exercises for 35 min to develop strength, endurance, ROM, flexibility, posture, and core stabilization including:     Patient education:  - swelling management  - avoiding aggravating activities!    Bike x6 min Lvl 5 full [place] for ROM and swelling management  Elliptical 6 min Lvl 5 for quad activation  SL hip thrust 3xReps fail  GTB sidesteps x3 laps (at ankles)  Goblet Squats with mirror feedback 3x10 25#  [place] Single Leg Squat with green sport cord 5x1  min      NEXT VISIT - Squats: cont to progress stability and strength per jina      Held:  Heel slides 30x5 sec  Hip thrust w/ 35 lbs 3x15 x10 sec  Sled Push/Pull 45lb 1 lap, 70lb 2 laps  Shuttle Squat Single Leg 2b 3x10  Heel raise lateral walk with YTB x4 laps  ASLR w/ 5lbs 25x 10 sec hold  Post tib YTB 5x30 reps  Heel slides AAROM 50x5 sec ea  Supine brige with HS curl 2x10 reps ea  GTB clams 3x burn ea  SL Bridge 3x burn ea  Wall sit 3x30 sec - foot position   Seated Hamstring Curl Machine - 10lbs Sgl leg 3x10  BFR 80% 30:15:15:15 for all:  SLR 2#  Prone HS curl   Shuttle Press 0.5b     Thomas received the following manual therapy techniques: Joint mobilizations, Manual Lymphatic Drainage, and Soft tissue Mobilization were applied to the: R knee for 00 minutes, including:    Held:  Tibial IR mobilization grade III-IV      Thomas participated in neuromuscular re-education activities to improve: Balance, Coordination, Kinesthetic, Sense, Proprioception, and Posture for 25 minutes. The following activities were included:    For hip dominance / foot proprioception:  SL RDL 10lbs KB 3x10 ea  Eccentric bulgarians 5x5 reps ea - 5 sec count down / up both sides  GTB standing clam 3x10x5 sec both sides      Held:  Bwd 5 in toe taps 3x10 ea with 10lb KB  Lat 5 in toe taps 3x10 ea with stick for min assist in balance  Step Up 3 in with blue TKE 20x - pt responded well  Bridge hold 5xHold fail      Home Exercises Provided and Patient Education Provided     Education provided:   - see above    Written Home Exercises Provided: Patient instructed to cont prior HEP.  Exercises were reviewed and Thomas was able to demonstrate them prior to the end of the session.  Thomas demonstrated good  understanding of the education provided.     See EMR under Patient Instructions for exercises provided  2/10/2023 .     Assessment     Patient ~12 weeks s/p ACLR.    Presented with subjective reports of improvements in gait. Pt progressed with CKC  strengthening today and tolerated volume progressions for [facility] split squat along with introduction of [place] return sport testing mvmts. Pt tolerated introduction of goblet loaded squat today.    Progressing as expected. Cont to focus on loading and proprioception within jina.    Thomas Is progressing well towards his goals.   Pt prognosis is Excellent.     Pt will continue to benefit from skilled outpatient physical therapy to address the deficits listed in the problem list box on initial evaluation, provide pt/family education and to maximize pt's level of independence in the home and community environment.     Pt's spiritual, cultural and educational needs considered and pt agreeable to plan of care and goals.    Anticipated barriers to physical therapy: none    GOALS: Short Term Goals: 0-3 months  1.Report decreased R knee pain  < / =  1/10  to increase tolerance for amb  2. Increase knee ROM to 3-0-145 in order to be able to perform ADLs without difficulty.  3. Increase strength by 1/3 MMT grade in Quads to increase tolerance for ADL and work activities.  4. Able to amb w/o deviation or complaint  5. Pt to tolerate HEP to improve ROM and independence with ADL's     Long Term Goals: 3-9 months  1. Able to return to running linearly   2. Able to complete [place] return to sport testing  2.Patient goal: [facility]  3.Increase strength to >/= 4+/5 in Quad and hip musculature to increase tolerance for ADL and work activities.  4. Pt will report at CJ level (20-40% impaired) on FOTO knee to demonstrate increase in LE function with every day tasks.     Plan     See POC in treatment section for evaluation; Recommend cont care 2x a week.    Adin Olson, SPT    I certify that I was present in the room directing the student in service delivery and guiding them using my skilled judgment. As the co-signing therapist I have reviewed the students documentation and am responsible for the treatment, assessment, and  plan.     Teodoro Chisholm PT, DPT, OCS

## 2023-03-01 ENCOUNTER — CLINICAL SUPPORT (OUTPATIENT)
Dept: REHABILITATION | Facility: HOSPITAL | Age: 48
End: 2023-03-01
Payer: COMMERCIAL

## 2023-03-01 DIAGNOSIS — M25.561 RIGHT KNEE PAIN, UNSPECIFIED CHRONICITY: Primary | ICD-10-CM

## 2023-03-01 DIAGNOSIS — M25.661 DECREASED ROM OF RIGHT KNEE: ICD-10-CM

## 2023-03-01 PROCEDURE — 97110 THERAPEUTIC EXERCISES: CPT

## 2023-03-01 PROCEDURE — 97112 NEUROMUSCULAR REEDUCATION: CPT

## 2023-03-01 NOTE — PROGRESS NOTES
Physical Therapy Daily Treatment Note     Name: Thomas Taveras  Clinic Number: [id number]    Therapy Diagnosis:   Encounter Diagnoses   Name Primary?    Right knee pain, unspecified chronicity Yes    Decreased ROM of right knee        Physician: Teodoro Marcus MD    Visit Date: 3/1/2023  Physician Orders: PT Eval and Treat   Medical Diagnosis from Referral: S83.511A (ICD-10-CM) - Complete tear of anterior cruciate ligament of right knee, initial encounter  Evaluation Date: 12/2/2022  Authorization Period Expiration: 12/01/2023  Plan of Care Expiration: 12/01/2023  Visit # / Visits authorized: 27 Total; 17/20     Time In: 12:50 pm  Time Out: 200 pm  Total Appointment Time (timed & untimed codes): 70 min      Precautions: Standard     Procedure:  Right anterior cruciate ligament reconstruction with bone patellar tendon bone autograft    Subjective     Pt reports: That he has begun walking more and reports his knee is responding well. Pt reports walking 2 miles at [facility] and having swelling following but dissipated within 3 hours.    FOTO IE: 52  FOTO 2/17/2023: 61    He was compliant with home exercise program.  Response to previous treatment: HEP complaince  Functional change: ADLs w/ min complaint and some swelling    Pain: 0-3/10  Location: right knee      Objective     Date of surgery: 12/01/2022  POW ~ 12 weeks 6 days as of 3/1/2023     PROM 0-0-125 deg at start of visit - 2/24/23  PROM 0-0-125 deg at end of visit - 2/8/23  Swelling measurement (at base of patella): 38.5 cm NT     Thomas received therapeutic exercises for 45 min to develop strength, endurance, ROM, flexibility, posture, and core stabilization including:     Patient education:  - swelling management  - avoiding aggravating activities!    Bike x8 min Lvl 5 full [place] for ROM and swelling management  SL hip thrust 3xReps fail  GTB sidesteps x3 laps (at ankles)  Goblet Squats with mirror feedback 3x10 35#  BB High  Squats 4x10 45# -  cues for hip shift  [place] Single Leg Squat with blue sport cord 5x till burn      Held:  Heel slides 30x5 sec  Hip thrust w/ 35 lbs 3x15 x10 sec  Sled Push/Pull 45lb 1 lap, 70lb 2 laps  Shuttle Squat Single Leg 2b 3x10  Heel raise lateral walk with YTB x4 laps  ASLR w/ 5lbs 25x 10 sec hold  Post tib YTB 5x30 reps  Heel slides AAROM 50x5 sec ea  Supine brige with HS curl 2x10 reps ea  GTB clams 3x burn ea  SL Bridge 3x burn ea  Wall sit 3x30 sec - foot position   Seated Hamstring Curl Machine - 10lbs Sgl leg 3x10  Elliptical 6 min Lvl 5 for quad activation  BFR 80% 30:15:15:15 for all:  SLR 2#  Prone HS curl   Shuttle Press 0.5b     Thomas received the following manual therapy techniques: Joint mobilizations, Manual Lymphatic Drainage, and Soft tissue Mobilization were applied to the: R knee for 00 minutes, including:    Held:  Tibial IR mobilization grade III-IV      Thomas participated in neuromuscular re-education activities to improve: Balance, Coordination, Kinesthetic, Sense, Proprioception, and Posture for 25 minutes. The following activities were included:    For hip dominance / foot proprioception:  SL RDL 10lbs KB 3x10 ea  Eccentric bulgarians 5x5 reps ea - 5 sec count down / up both sides  Step Down 4 in with blue TKE with single UE support 3x10    Held:  Bwd 5 in toe taps 3x10 ea with 10lb KB  Lat 5 in toe taps 3x10 ea with stick for min assist in balance  GTB standing clam 3x10x5 sec both sides  Bridge hold 5xHold fail      Home Exercises Provided and Patient Education Provided     Education provided:   - see above    Written Home Exercises Provided: Patient instructed to cont prior HEP.  Exercises were reviewed and Thomas was able to demonstrate them prior to the end of the session.  Thomas demonstrated good  understanding of the education provided.     See EMR under Patient Instructions for exercises provided  2/10/2023 .     Assessment     Patient ~12.5 weeks s/p ACLR.    Presented with subjective reports  of improvements in gait and responding well with increased walking volume. Pt progressed with CKC strengthening today and tolerated volume progressions for [place] split squat. Pt required significant cueing for BB back squats with difficulty in loading RLE.    Progressing as expected. Cont to focus on loading and proprioception within jina.    Thomas Is progressing well towards his goals.   Pt prognosis is Excellent.     Pt will continue to benefit from skilled outpatient physical therapy to address the deficits listed in the problem list box on initial evaluation, provide pt/family education and to maximize pt's level of independence in the home and community environment.     Pt's spiritual, cultural and educational needs considered and pt agreeable to plan of care and goals.    Anticipated barriers to physical therapy: none    GOALS: Short Term Goals: 0-3 months  1.Report decreased R knee pain  < / =  1/10  to increase tolerance for amb  2. Increase knee ROM to 3-0-145 in order to be able to perform ADLs without difficulty.  3. Increase strength by 1/3 MMT grade in Quads to increase tolerance for ADL and work activities.  4. Able to amb w/o deviation or complaint  5. Pt to tolerate HEP to improve ROM and independence with ADL's     Long Term Goals: 3-9 months  1. Able to return to running linearly   2. Able to complete [place] return to sport testing  2.Patient goal: [facility]  3.Increase strength to >/= 4+/5 in Quad and hip musculature to increase tolerance for ADL and work activities.  4. Pt will report at CJ level (20-40% impaired) on FOTO knee to demonstrate increase in LE function with every day tasks.     Plan     See POC in treatment section for evaluation; Recommend cont care 2x a week.    Adin Olson, SPT    I certify that I was present in the room directing the student in service delivery and guiding them using my skilled judgment. As the co-signing therapist I have reviewed the students  documentation and am responsible for the treatment, assessment, and plan.     Teodoro Chisholm PT, DPT, OCS

## 2023-03-02 NOTE — PROGRESS NOTES
"Patient ID:   Thomas Taveras is a 47 y.o. male.    Chief Complaint:   5w 1d s/p R ACLR with BTB autograft    HPI:   The patient is returning for evaluation of the right knee. He is doing much better. He reports improved ROM but still gets swelling.     Medications:    Current Outpatient Medications:     emtricitabine-tenofovir 200-300 mg (TRUVADA) 200-300 mg Tab, Take 1 tablet by mouth., Disp: , Rfl:     ketoconazole (NIZORAL) 2 % shampoo, , Disp: , Rfl:     Allergies:  Review of patient's allergies indicates:  No Known Allergies    Vitals:  /76   Pulse 80   Ht 5' 8" (1.727 m)   Wt 73.8 kg (162 lb 11.2 oz)   BMI 24.74 kg/m²     Physical Examination:  Ortho Exam   Right knee exam:  2+ effusion  ROM 0-120  [place] 0    Assessment:  1. S/P ACL reconstruction        Plan:  I have recommended repeat aspiration of the knee. He will undergo today and continue PT. He will return in six weeks.        No follow-ups on file.          "

## 2023-03-02 NOTE — PROCEDURES
Large Joint Aspiration/Injection: R knee    Date/Time: 2/22/2023 11:00 AM  Performed by: Teodoro Marcus MD  Authorized by: Teodoro Marcus MD     Consent Done?:  Yes (Verbal)  Indications:  Joint swelling  Site marked: the procedure site was marked    Timeout: prior to procedure the correct patient, procedure, and site was verified    Prep: patient was prepped and draped in usual sterile fashion      Local anesthesia used?: Yes    Anesthesia:  Local infiltration  Local anesthetic:  Lidocaine 1% without epinephrine  Anesthetic total (ml):  8      Details:  Needle Size:  18 G  Ultrasonic Guidance for needle placement?: No    Approach:  Lateral  Location:  Knee  Site:  R knee  Aspirate:  Blood-tinged  Patient tolerance:  Patient tolerated the procedure well with no immediate complications

## 2023-03-03 ENCOUNTER — CLINICAL SUPPORT (OUTPATIENT)
Dept: REHABILITATION | Facility: HOSPITAL | Age: 48
End: 2023-03-03
Payer: COMMERCIAL

## 2023-03-03 DIAGNOSIS — M25.661 DECREASED ROM OF RIGHT KNEE: ICD-10-CM

## 2023-03-03 DIAGNOSIS — M25.561 RIGHT KNEE PAIN, UNSPECIFIED CHRONICITY: Primary | ICD-10-CM

## 2023-03-03 PROCEDURE — 97110 THERAPEUTIC EXERCISES: CPT

## 2023-03-03 PROCEDURE — 97112 NEUROMUSCULAR REEDUCATION: CPT

## 2023-03-03 NOTE — PROGRESS NOTES
Physical Therapy Daily Treatment Note     Name: Thomas Taveras  Clinic Number: [id number]    Therapy Diagnosis:   Encounter Diagnoses   Name Primary?    Right knee pain, unspecified chronicity Yes    Decreased ROM of right knee        Physician: Teodoro Marcus MD    Visit Date: 3/3/2023  Physician Orders: PT Eval and Treat   Medical Diagnosis from Referral: S83.511A (ICD-10-CM) - Complete tear of anterior cruciate ligament of right knee, initial encounter  Evaluation Date: 12/2/2022  Authorization Period Expiration: 12/01/2023  Plan of Care Expiration: 12/01/2023  Visit # / Visits authorized: 28 Total; 18/20    Time In: 1300  Time Out: 1400  Total Appointment Time (timed & untimed codes): 60 min      Precautions: Standard     Procedure:  Right anterior cruciate ligament reconstruction with bone patellar tendon bone autograft    Subjective     Pt reports: No complaints today.    FOTO IE: 52  FOTO 2/17/2023: 61    NEXT VISIT - FOTO    He was compliant with home exercise program.  Response to previous treatment: HEP complaince  Functional change: ADLs w/ min complaint and some swelling    Pain: 0-3/10  Location: right knee      Objective     Date of surgery: 12/01/2022  POW ~ 13 weeks 1 days as of 3/3/2023     PROM 0-0-125 deg at start of visit - 2/24/23  PROM 0-0-125 deg at end of visit - 2/8/23  Swelling measurement (at base of patella): 38.5 cm NT     Thomas received therapeutic exercises for 25 min to develop strength, endurance, ROM, flexibility, posture, and core stabilization including:     Patient education:  - swelling management  - avoiding aggravating activities!    Bike x6 min Lvl 5 full [place] for ROM and swelling management  BTB sidesteps x3 laps (at ankles)  BTB hip thrust 30x10 sec ea  Sled Pull 45lb x3 laps  Heel raises [id number] sec      NEXT VISIT - cont quad loading / proprioception      Held:  SL hip thrust 3xReps fail  Goblet Squats with mirror feedback 3x10 35#  [place] Single Leg Squat with blue  sport cord 5x till burn  Shuttle Squat Single Leg 2b 3x10  Supine brige with HS curl 2x10 reps ea  GTB clams 3x burn ea  SL Bridge 3x burn ea  Wall sit 3x30 sec - foot position   Seated Hamstring Curl Machine - 10lbs Sgl leg 3x10  Elliptical 6 min Lvl 5 for quad activation  BFR 80% 30:15:15:15 for all:  SLR 2#  Prone HS curl   Shuttle Press 0.5b     Thomas received the following manual therapy techniques: Joint mobilizations, Manual Lymphatic Drainage, and Soft tissue Mobilization were applied to the: R knee for 00 minutes, including:    Held:  Tibial IR mobilization grade III-IV      Thomas participated in neuromuscular re-education activities to improve: Balance, Coordination, Kinesthetic, Sense, Proprioception, and Posture for 35 minutes. The following activities were included:    Squat Mechanics -> quad and hip loading:   Squat 45# 2x10-12 reps ea  Squat 65# 2x8-12 reps ea  Squat 95# 2x5-8 reps ea    Isometric quad at 90 deg 10x20 sec  Knee extension matrix DL 15-25# 4x10 ea - cue for quad loading    SL RDL 26lbs KB 3x10 ea    Held:  Eccentric bulgarians 5x5 reps ea - 5 sec count down / up both sides  Step Down 4 in with blue TKE with single UE support 3x10  Bwd 5 in toe taps 3x10 ea with 10lb KB  Lat 5 in toe taps 3x10 ea with stick for min assist in balance  GTB standing clam 3x10x5 sec both sides      Home Exercises Provided and Patient Education Provided     Education provided:   - see above    Written Home Exercises Provided: Patient instructed to cont prior HEP.  Exercises were reviewed and Thomas was able to demonstrate them prior to the end of the session.  Thomas demonstrated good  understanding of the education provided.     See EMR under Patient Instructions for exercises provided  2/10/2023 .     Assessment     Patient ~13 weeks s/p ACLR.    Continues to require cuing on squat form and thu quad loading as patient has difficulty feeling quad recruitment at times reporting knee soreness. Improved loading  mechanics and recruitment from above interventions w/o c/o knee soreness, but patient likely to cont require cuing to elicit sensation of recruitment. Has improved with glute loading well over time.     Overall cont to be pogressing. Cont to focus on loading and proprioception within jina.    Thomas Is progressing well towards his goals.   Pt prognosis is Excellent.     Pt will continue to benefit from skilled outpatient physical therapy to address the deficits listed in the problem list box on initial evaluation, provide pt/family education and to maximize pt's level of independence in the home and community environment.     Pt's spiritual, cultural and educational needs considered and pt agreeable to plan of care and goals.    Anticipated barriers to physical therapy: none    GOALS: Short Term Goals: 0-3 months  1.Report decreased R knee pain  < / =  1/10  to increase tolerance for amb  2. Increase knee ROM to 3-0-145 in order to be able to perform ADLs without difficulty.  3. Increase strength by 1/3 MMT grade in Quads to increase tolerance for ADL and work activities.  4. Able to amb w/o deviation or complaint  5. Pt to tolerate HEP to improve ROM and independence with ADL's     Long Term Goals: 3-9 months  1. Able to return to running linearly   2. Able to complete [place] return to sport testing  2.Patient goal: [facility]  3.Increase strength to >/= 4+/5 in Quad and hip musculature to increase tolerance for ADL and work activities.  4. Pt will report at CJ level (20-40% impaired) on FOTO knee to demonstrate increase in LE function with every day tasks.     Plan     See POC in treatment section for evaluation; Recommend cont care 2x a week.    YOLETTE MEDINA, PT, DPT, OCS

## 2023-03-08 ENCOUNTER — CLINICAL SUPPORT (OUTPATIENT)
Dept: REHABILITATION | Facility: HOSPITAL | Age: 48
End: 2023-03-08
Payer: COMMERCIAL

## 2023-03-08 DIAGNOSIS — M25.661 DECREASED ROM OF RIGHT KNEE: ICD-10-CM

## 2023-03-08 DIAGNOSIS — M25.561 RIGHT KNEE PAIN, UNSPECIFIED CHRONICITY: Primary | ICD-10-CM

## 2023-03-08 PROCEDURE — 97110 THERAPEUTIC EXERCISES: CPT

## 2023-03-08 PROCEDURE — 97112 NEUROMUSCULAR REEDUCATION: CPT

## 2023-03-08 PROCEDURE — 97140 MANUAL THERAPY 1/> REGIONS: CPT

## 2023-03-08 NOTE — PROGRESS NOTES
"  Physical Therapy Daily Treatment Note     Name: Thomas Taveras  Clinic Number: [id number]    Therapy Diagnosis:   Encounter Diagnoses   Name Primary?    Right knee pain, unspecified chronicity Yes    Decreased ROM of right knee      Physician: Teodoro Marcus MD    Visit Date: 3/8/2023  Physician Orders: PT Eval and Treat   Medical Diagnosis from Referral: S83.511A (ICD-10-CM) - Complete tear of anterior cruciate ligament of right knee, initial encounter  Evaluation Date: 12/2/2022  Authorization Period Expiration: 12/01/2023  Plan of Care Expiration: 12/01/2023  Visit # / Visits authorized: 19/30    Time In: 1300  Time Out: 1400  Total Appointment Time (timed & untimed codes): 60 min      Precautions: Standard     Procedure:  Right anterior cruciate ligament reconstruction with bone patellar tendon bone autograft    Subjective     Pt reports: a little soreness after last visit. Pt reports he has been walking 2 miles with relatively good tolerance, improved tolerance to driving extended periods. Working out with  focusing on upperbody.    FOTO IE: 52  FOTO 2/17/2023: 61    NEXT VISIT - FOTO    He was compliant with home exercise program.  Response to previous treatment: HEP complaince  Functional change: ADLs w/ min complaint and some swelling    Pain: 0-3/10  Location: right knee      Objective     Date of surgery: 12/01/2022  POW ~ 13 weeks 5 days as of 3/3/2023     PROM 0-0-125 deg at start of visit     Thomas received therapeutic exercises for 38 min to develop strength, endurance, ROM, flexibility, posture, and core stabilization including:     Patient education:  - swelling management  - avoiding aggravating activities!    Bike x6 min Lvl 5 full [place] for ROM and swelling management  Prone quad stretch 4x30"  SLR 0# 2x15 3" hold  LAQ 0# 4x15  SL hip abduction 2# 3x15  Pt education on post op precautions, return to activity/exercise, signs of adverse effects of exercise    Held:  SL hip thrust 3xReps " "fail  Goblet Squats with mirror feedback 3x10 35#  [place] Single Leg Squat with blue sport cord 5x till burn  Shuttle Squat Single Leg 2b 3x10  Supine brige with HS curl 2x10 reps ea  GTB clams 3x burn ea  Wall sit 3x30 sec - foot position   Seated Hamstring Curl Machine - 10lbs Sgl leg 3x10  Elliptical 6 min Lvl 5 for quad activation  BFR 80% 30:15:15:15 for all:  SLR 2#  Prone HS curl   Shuttle Press 0.5b     Thomas received the following manual therapy techniques: Joint mobilizations, Manual Lymphatic Drainage, and Soft tissue Mobilization were applied to the: R knee for 10 minutes, including:    Patellar mobs inferior / medial  Knee ROM assessment    Held:  Tibial IR mobilization grade III-IV      Thomas participated in neuromuscular re-education activities to improve: Balance, Coordination, Kinesthetic, Sense, Proprioception, and Posture for 12 minutes. The following activities were included:    Front plank 3x15" (cues for TKE/ neutral spine)  Bridge GTB 2x15 (cues for core activation)  SL Bridge 3x burn ea    Held:  Squat Mechanics -> quad and hip loading:   Squat 45# 2x10-12 reps ea  Squat 65# 2x8-12 reps ea  Squat 95# 2x5-8 reps ea  Isometric quad at 90 deg 10x20 sec  Knee extension matrix DL 15-25# 4x10 ea - cue for quad loading  SL RDL 26lbs KB 3x10 ea  Eccentric bulgarians 5x5 reps ea - 5 sec count down / up both sides  Step Down 4 in with blue TKE with single UE support 3x10  Bwd 5 in toe taps 3x10 ea with 10lb KB  Lat 5 in toe taps 3x10 ea with stick for min assist in balance  GTB standing clam 3x10x5 sec both sides      Home Exercises Provided and Patient Education Provided     Education provided:   - see above    Written Home Exercises Provided: Patient instructed to cont prior HEP.  Exercises were reviewed and Thomas was able to demonstrate them prior to the end of the session.  Thomas demonstrated good  understanding of the education provided.     See EMR under Patient Instructions for exercises provided  " 2/10/2023 .     Assessment     Patient ~13 weeks s/p ACLR.    Pt presents with +1 edema to R knee. Pt with near full flexion, assymetry more evident in prone vs heel slide. Pt benefits from cues for TKE during SLR and plank. Pt educated on appropriate response to exercise and what sx warrant discontinuing exercise. Will re-assess ROM / edema next visit and progress as tolerated according to protocol    Thomas Is progressing well towards his goals.   Pt prognosis is Excellent.     Pt will continue to benefit from skilled outpatient physical therapy to address the deficits listed in the problem list box on initial evaluation, provide pt/family education and to maximize pt's level of independence in the home and community environment.     Pt's spiritual, cultural and educational needs considered and pt agreeable to plan of care and goals.    Anticipated barriers to physical therapy: none    GOALS: Short Term Goals: 0-3 months  1.Report decreased R knee pain  < / =  1/10  to increase tolerance for amb  2. Increase knee ROM to 3-0-145 in order to be able to perform ADLs without difficulty.  3. Increase strength by 1/3 MMT grade in Quads to increase tolerance for ADL and work activities.  4. Able to amb w/o deviation or complaint  5. Pt to tolerate HEP to improve ROM and independence with ADL's     Long Term Goals: 3-9 months  1. Able to return to running linearly   2. Able to complete [place] return to sport testing  2.Patient goal: [facility]  3.Increase strength to >/= 4+/5 in Quad and hip musculature to increase tolerance for ADL and work activities.  4. Pt will report at CJ level (20-40% impaired) on FOTO knee to demonstrate increase in LE function with every day tasks.     Plan     See POC in treatment section for evaluation; Recommend cont care 2x a week.    Андрей Jang, PT, DPT, SCS

## 2023-03-10 ENCOUNTER — CLINICAL SUPPORT (OUTPATIENT)
Dept: REHABILITATION | Facility: HOSPITAL | Age: 48
End: 2023-03-10
Payer: COMMERCIAL

## 2023-03-10 DIAGNOSIS — M25.561 RIGHT KNEE PAIN, UNSPECIFIED CHRONICITY: Primary | ICD-10-CM

## 2023-03-10 DIAGNOSIS — M25.661 DECREASED ROM OF RIGHT KNEE: ICD-10-CM

## 2023-03-10 PROCEDURE — 97112 NEUROMUSCULAR REEDUCATION: CPT

## 2023-03-10 PROCEDURE — 97110 THERAPEUTIC EXERCISES: CPT

## 2023-03-10 NOTE — PROGRESS NOTES
"  Physical Therapy Daily Treatment Note     Name: Thomas Taveras  Clinic Number: [id number]    Therapy Diagnosis:   Encounter Diagnoses   Name Primary?    Right knee pain, unspecified chronicity Yes    Decreased ROM of right knee      Physician: Teodoro Marcus MD    Visit Date: 3/10/2023  Physician Orders: PT Eval and Treat   Medical Diagnosis from Referral: S83.511A (ICD-10-CM) - Complete tear of anterior cruciate ligament of right knee, initial encounter  Evaluation Date: 12/2/2022  Authorization Period Expiration: 12/01/2023  Plan of Care Expiration: 12/01/2023  Visit # / Visits authorized: 20/30    Time In: 100  Time Out: 200  Total Appointment Time (timed & untimed codes): 60 min      Precautions: Standard     Procedure:  Right anterior cruciate ligament reconstruction with bone patellar tendon bone autograft    Subjective     Pt reports: no inc in pain after last visit. Pt reprots continued soreness / min swelling after extend periods of walking. HEP going well doing about 10 SLR / day.    He was compliant with home exercise program.  Response to previous treatment: HEP complaince  Functional change: ADLs w/ min complaint and some swelling    Pain: 0-3/10  Location: right knee      Objective     Date of surgery: 12/01/2022  POD 14 weeks 1 days    Range of Motion:   Knee Right Left   Active 2-0-125 2-0-135   Passive 2-0-125 2-0-135     Thomas received therapeutic exercises for 33 min to develop strength, endurance, ROM, flexibility, posture, and core stabilization including:     Patient education:  - swelling management  - avoiding aggravating activities!    Bike x6 min Lvl 5 full [place] for ROM and swelling management  Prone quad stretch 4x30"  SLR 1# 2x15 3" hold  LAQ on hammer strength 0# 4x15  SL hip abduction 2# 3x15  Shuttle Squat Single Leg 2b 4x8  Pt education on post op precautions, return to activity/exercise, signs of adverse effects of exercise    Held:  SL hip thrust 3xReps fail  Goblet Squats with " "mirror feedback 3x10 35#  [place] Single Leg Squat with blue sport cord 5x till burn  GTB clams 3x burn ea  Wall sit 3x30 sec - foot position      Thomas received the following manual therapy techniques: Joint mobilizations, Manual Lymphatic Drainage, and Soft tissue Mobilization were applied to the: R knee for 0 minutes, including:    Patellar mobs inferior / medial  Knee ROM assessment    Held:  Tibial IR mobilization grade III-IV      Thomas participated in neuromuscular re-education activities to improve: Balance, Coordination, Kinesthetic, Sense, Proprioception, and Posture for 27 minutes. The following activities were included:    Side steps w/ GTB 3 laps  2" step down 3x10  Heel elevated squat w/ 10# 4x10  Retrowalking on treadmill up 6% grade 2 mph (cues for TKE)    Held:    Isometric quad at 90 deg 10x20 sec  Knee extension matrix DL 15-25# 4x10 ea - cue for quad loading  SL RDL 26lbs KB 3x10 ea  Eccentric bulgarians 5x5 reps ea - 5 sec count down / up both sides  Step Down 4 in with blue TKE with single UE support 3x10  Bwd 5 in toe taps 3x10 ea with 10lb KB  Lat 5 in toe taps 3x10 ea with stick for min assist in balance  GTB standing clam 3x10x5 sec both sides      Home Exercises Provided and Patient Education Provided     Education provided:   - see above    Written Home Exercises Provided: Patient instructed to cont prior HEP.  Exercises were reviewed and Thomas was able to demonstrate them prior to the end of the session.  Thomas demonstrated good  understanding of the education provided.     See EMR under Patient Instructions for exercises provided  2/10/2023 .     Assessment     Pt presents with no change in edema, lacking end range flexion. Pt tolerates CKC strengthening well but unable to tolerate quad strengthening in open chain. Pt instructed to perform at least 40 reps of SLR prior to walking and shoot for 100 reps/week at least. Pt tolerates retrowalking up incline well, initially benefits from cues " for TKE. Will re-assess ROM/strength next visit and progress accordingly.    Thomas Is progressing well towards his goals.   Pt prognosis is Excellent.     Pt will continue to benefit from skilled outpatient physical therapy to address the deficits listed in the problem list box on initial evaluation, provide pt/family education and to maximize pt's level of independence in the home and community environment.     Pt's spiritual, cultural and educational needs considered and pt agreeable to plan of care and goals.    Anticipated barriers to physical therapy: none    GOALS: Short Term Goals: 0-3 months  1.Report decreased R knee pain  < / =  1/10  to increase tolerance for amb  2. Increase knee ROM to 3-0-145 in order to be able to perform ADLs without difficulty.  3. Increase strength by 1/3 MMT grade in Quads to increase tolerance for ADL and work activities.  4. Able to amb w/o deviation or complaint  5. Pt to tolerate HEP to improve ROM and independence with ADL's     Long Term Goals: 3-9 months  1. Able to return to running linearly   2. Able to complete [place] return to sport testing  2.[facility]: [facility]  3.Increase strength to >/= 4+/5 in Quad and hip musculature to increase tolerance for ADL and work activities.  4. Pt will report at CJ level (20-40% impaired) on FOTO knee to demonstrate increase in LE function with every day tasks.     Plan     See POC in treatment section for evaluation; Recommend cont care 2x a week.    Андрей Jang, PT, DPT, SCS

## 2023-03-13 ENCOUNTER — CLINICAL SUPPORT (OUTPATIENT)
Dept: REHABILITATION | Facility: HOSPITAL | Age: 48
End: 2023-03-13
Payer: COMMERCIAL

## 2023-03-13 DIAGNOSIS — M25.661 DECREASED ROM OF RIGHT KNEE: ICD-10-CM

## 2023-03-13 DIAGNOSIS — M25.561 RIGHT KNEE PAIN, UNSPECIFIED CHRONICITY: Primary | ICD-10-CM

## 2023-03-13 PROCEDURE — 97140 MANUAL THERAPY 1/> REGIONS: CPT

## 2023-03-13 PROCEDURE — 97112 NEUROMUSCULAR REEDUCATION: CPT

## 2023-03-13 PROCEDURE — 97110 THERAPEUTIC EXERCISES: CPT

## 2023-03-16 ENCOUNTER — CLINICAL SUPPORT (OUTPATIENT)
Dept: REHABILITATION | Facility: HOSPITAL | Age: 48
End: 2023-03-16
Payer: COMMERCIAL

## 2023-03-16 DIAGNOSIS — M25.561 RIGHT KNEE PAIN, UNSPECIFIED CHRONICITY: Primary | ICD-10-CM

## 2023-03-16 DIAGNOSIS — M25.661 DECREASED ROM OF RIGHT KNEE: ICD-10-CM

## 2023-03-16 PROCEDURE — 97112 NEUROMUSCULAR REEDUCATION: CPT

## 2023-03-16 PROCEDURE — 97110 THERAPEUTIC EXERCISES: CPT

## 2023-03-16 NOTE — PROGRESS NOTES
"  Physical Therapy Daily Treatment Note     Name: Thomas Taveras  Clinic Number: [id number]    Therapy Diagnosis:   No diagnosis found.    Physician: Teodoro Marcus MD    Visit Date: 3/16/2023  Physician Orders: PT Eval and Treat   Medical Diagnosis from Referral: S83.511A (ICD-10-CM) - Complete tear of anterior cruciate ligament of right knee, initial encounter  Evaluation Date: 12/2/2022  Authorization Period Expiration: 12/01/2023  Plan of Care Expiration: 12/01/2023  Visit # / Visits authorized: 21/30    Time In: 1000  Time Out: 1100  Total Appointment Time (timed & untimed codes): 60 min      Precautions: Standard     Procedure:  Right anterior cruciate ligament reconstruction with bone patellar tendon bone autograft    Subjective     Pt reports: inc soreness from driving for extended periods. Has been consistent w/ quad stretch and SLR    He was compliant with home exercise program.  Response to previous treatment: HEP complaince  Functional change: ADLs w/ min complaint and some swelling    Pain: 0-3/10  Location: right knee      Objective     Date of surgery: 12/01/2022  POD 15 weeks 1 days    Range of Motion:   Knee Right Left   Active 2-0-125 2-0-135   Passive 2-0-128 2-0-135     Quad MVIC at 60 deg knee flexion  L = 55 kg  R = 15 kg (73% deficit)     Thomas received therapeutic exercises for 42 min to develop strength, endurance, ROM, flexibility, posture, and core stabilization including:     Patient education:  - swelling management  - avoiding aggravating activities!    Bike x6 min Lvl 5 full [place] for ROM and swelling management  Prone quad stretch 4x30"  SLR 0# x20 3" hold  Standing quad stretch 4x30"  Tests and measures  Knee ext machine 15# 4x8  Leg press DL squats x20 100#  Leg press SL squats 3x10 60#  Pt education on post op precautions, return to activity/exercise, signs of adverse effects of exercise    Held:  SL bridge 3x10  SL hip thrust 3xReps fail  LAQ on hammer strength 0# 4x15  Goblet " "Squats with mirror feedback 3x10 35#  [place] Single Leg Squat with blue sport cord 5x till burn  GTB clams 3x burn ea  Wall sit 3x30 sec - foot position   SL hip abduction 2# 3x15  Shuttle Squat Single Leg 2b 4x8     Thomas received the following manual therapy techniques: Joint mobilizations, Manual Lymphatic Drainage, and Soft tissue Mobilization were applied to the: R knee for 00 minutes, including:    Patellar mobs inferior / medial  Knee ROM assessment    Held:  Tibial IR mobilization grade III-IV      Thomas participated in neuromuscular re-education activities to improve: Balance, Coordination, Kinesthetic, Sense, Proprioception, and Posture for 18 minutes. The following activities were included:    TRX retrolunge 3x10  TRX pistol squat 3x10 (last 2 sets heel elevated)    Held:  Isometric quad at 960 deg 30 sec x5  3" step down 3x10  Slant board squat w/ 25# 4x10  Retro sled pull 90# 3x40 ft  sled push 90# 3x40 ft  Side steps w/ GTB 3 laps  Knee extension matrix DL 15-25# 4x10 ea - cue for quad loading  SL RDL 26lbs KB 3x10 ea  Eccentric bulgarians 5x5 reps ea - 5 sec count down / up both sides  Step Down 4 in with blue TKE with single UE support 3x10  Bwd 5 in toe taps 3x10 ea with 10lb KB  Lat 5 in toe taps 3x10 ea with stick for min assist in balance  GTB standing clam 3x10x5 sec both sides      Home Exercises Provided and Patient Education Provided     Education provided:   - see above    Written Home Exercises Provided: Patient instructed to cont prior HEP.  Exercises were reviewed and Thomas was able to demonstrate them prior to the end of the session.  Thomas demonstrated good  understanding of the education provided.     See EMR under Patient Instructions for exercises provided  2/10/2023 .     Assessment     Pt presents with continued flexion discrepancy. Pt's quad testing at 73% deficit today, likely contributing to poor tolerance with walking/ ADL's. Pt instructed to start working quad strength at the " gym. Pt educated on appropriate response to strenghtening exercises and what indicates he needs to regress. Will re-assess ROM next visit and progress strength as tolerated.    Thomas Is progressing well towards his goals.   Pt prognosis is Excellent.     Pt will continue to benefit from skilled outpatient physical therapy to address the deficits listed in the problem list box on initial evaluation, provide pt/family education and to maximize pt's level of independence in the home and community environment.     Pt's spiritual, cultural and educational needs considered and pt agreeable to plan of care and goals.    Anticipated barriers to physical therapy: none    GOALS: Short Term Goals: 0-3 months  1.Report decreased R knee pain  < / =  1/10  to increase tolerance for amb  2. Increase knee ROM to 3-0-145 in order to be able to perform ADLs without difficulty.  3. Increase strength by 1/3 MMT grade in Quads to increase tolerance for ADL and work activities.  4. Able to amb w/o deviation or complaint  5. Pt to tolerate HEP to improve ROM and independence with ADL's     Long Term Goals: 3-9 months  1. Able to return to running linearly   2. Able to complete [place] return to sport testing  2.Patient goal: Return to [employer]  3.Increase strength to >/= 4+/5 in Quad and hip musculature to increase tolerance for ADL and work activities.  4. Pt will report at CJ level (20-40% impaired) on FOTO knee to demonstrate increase in LE function with every day tasks.     Plan     See POC in treatment section for evaluation; Recommend cont care 2x a week.    Андрей Jang, PT, DPT, SCS            .

## 2023-03-20 ENCOUNTER — CLINICAL SUPPORT (OUTPATIENT)
Dept: REHABILITATION | Facility: HOSPITAL | Age: 48
End: 2023-03-20
Payer: COMMERCIAL

## 2023-03-20 DIAGNOSIS — M25.661 DECREASED ROM OF RIGHT KNEE: ICD-10-CM

## 2023-03-20 DIAGNOSIS — M25.561 RIGHT KNEE PAIN, UNSPECIFIED CHRONICITY: Primary | ICD-10-CM

## 2023-03-20 PROCEDURE — 97112 NEUROMUSCULAR REEDUCATION: CPT

## 2023-03-20 PROCEDURE — 97110 THERAPEUTIC EXERCISES: CPT

## 2023-03-20 NOTE — PROGRESS NOTES
"  Physical Therapy Daily Treatment Note     Name: Thomas Taveras  Clinic Number: [id number]    Therapy Diagnosis:   Encounter Diagnoses   Name Primary?    Right knee pain, unspecified chronicity Yes    Decreased ROM of right knee        Physician: Teodoro Marcus MD    Visit Date: 3/20/2023  Physician Orders: PT Eval and Treat   Medical Diagnosis from Referral: S83.511A (ICD-10-CM) - Complete tear of anterior cruciate ligament of right knee, initial encounter  Evaluation Date: 12/2/2022  Authorization Period Expiration: 12/01/2023  Plan of Care Expiration: 12/01/2023  Visit # / Visits authorized: 23/30    Time In: 1000  Time Out: 1100  Total Appointment Time (timed & untimed codes): 60 min      Precautions: Standard     Procedure:  Right anterior cruciate ligament reconstruction with bone patellar tendon bone autograft    Subjective     Pt reports: primarily quad soreness after last visit. Has added LE strength progressions at the gym and increasing quad stretching throughout the week. Tolerating well    He was compliant with home exercise program.  Response to previous treatment: HEP complaince  Functional change: ADLs w/ min complaint and some swelling    Pain: 0-3/10  Location: right knee      Objective     Date of surgery: 12/01/2022  POD 15 weeks 4 days    Range of Motion:   Knee Right Left   Active 2-0-125 2-0-135   Passive 2-0-130 2-0-135     Quad MVIC at 60 deg knee flexion 3/16/23  L = 55 kg  R = 15 kg (73% deficit)     Thomas received therapeutic exercises for 42 min to develop strength, endurance, ROM, flexibility, posture, and core stabilization including:     Patient education:  - swelling management  - avoiding aggravating activities!    Bike x6 min Lvl 5 full [place] for ROM and swelling management  Prone quad stretch 4x30"  SLR 1# x20 3" hold  Standing quad stretch 4x30"  Knee hammer strength 5# 2x10  Knee hammer strength 7.5# 2x8    Pt education on post op precautions, return to activity/exercise, " "signs of adverse effects of exercise    Held:    SL hip thrust 3xReps fail  LAQ on hammer strength 0# 4x15  Goblet Squats with mirror feedback 3x10 35#  [place] Single Leg Squat with blue sport cord 5x till burn  GTB clams 3x burn ea  Wall sit 3x30 sec - foot position   SL hip abduction 2# 3x15  Shuttle Squat Single Leg 2b 4x8  Leg press DL squats x20 100#  Leg press SL squats 3x10 60#     Thomas received the following manual therapy techniques: Joint mobilizations, Manual Lymphatic Drainage, and Soft tissue Mobilization were applied to the: R knee for 00 minutes, including:    Patellar mobs inferior / medial  Knee ROM assessment    Held:  Tibial IR mobilization grade III-IV      Thomas participated in neuromuscular re-education activities to improve: Balance, Coordination, Kinesthetic, Sense, Proprioception, and Posture for 28 minutes. The following activities were included:    SL bridge 3x10  Side steps w/ GTB 2 laps w/ 25# pressout  Squats w/ 25# press out 3x10  3" step down 3x20    Held:  Isometric quad at 60 deg 30 sec x5  Slant board squat w/ 25# 4x10  Retro sled pull 90# 3x40 ft  GTB standing clam 3x10x5 sec both sides  TRX retrolunge 3x10  TRX pistol squat 3x10 (last 2 sets heel elevated)    Home Exercises Provided and Patient Education Provided     Education provided:   - see above    Written Home Exercises Provided: Patient instructed to cont prior HEP.  Exercises were reviewed and Thomas was able to demonstrate them prior to the end of the session.  Thomas demonstrated good  understanding of the education provided.     See EMR under Patient Instructions for exercises provided  2/10/2023 .     Assessment     Pt with good carryover of ROM from last visit. Pt with improved quad strength demonstrated by improved tolerance to open chain quad strengthening. Pt educated on importance of continued compliance w/ quad stretching and resistance training for quad at gym.    Thomas Is progressing well towards his goals.   Pt " prognosis is Excellent.     Pt will continue to benefit from skilled outpatient physical therapy to address the deficits listed in the problem list box on initial evaluation, provide pt/family education and to maximize pt's level of independence in the home and community environment.     Pt's spiritual, cultural and educational needs considered and pt agreeable to plan of care and goals.    Anticipated barriers to physical therapy: none    GOALS: Short Term Goals: 0-3 months  1.Report decreased R knee pain  < / =  1/10  to increase tolerance for amb  2. Increase knee ROM to 3-0-145 in order to be able to perform ADLs without difficulty.  3. Increase strength by 1/3 MMT grade in Quads to increase tolerance for ADL and work activities.  4. Able to amb w/o deviation or complaint  5. Pt to tolerate HEP to improve ROM and independence with ADL's     Long Term Goals: 3-9 months  1. Able to return to running linearly   2. Able to complete [place] return to sport testing  2.Patient goal: Return to [facility]  3.Increase strength to >/= 4+/5 in Quad and hip musculature to increase tolerance for ADL and work activities.  4. Pt will report at CJ level (20-40% impaired) on FOTO knee to demonstrate increase in LE function with every day tasks.     Plan     See POC in treatment section for evaluation; Recommend cont care 2x a week.    Андрей Jang, PT, DPT, SCS            .

## 2023-03-23 ENCOUNTER — CLINICAL SUPPORT (OUTPATIENT)
Dept: REHABILITATION | Facility: HOSPITAL | Age: 48
End: 2023-03-23
Payer: COMMERCIAL

## 2023-03-23 DIAGNOSIS — M25.661 DECREASED ROM OF RIGHT KNEE: ICD-10-CM

## 2023-03-23 DIAGNOSIS — M25.561 RIGHT KNEE PAIN, UNSPECIFIED CHRONICITY: Primary | ICD-10-CM

## 2023-03-23 PROCEDURE — 97112 NEUROMUSCULAR REEDUCATION: CPT

## 2023-03-23 PROCEDURE — 97140 MANUAL THERAPY 1/> REGIONS: CPT

## 2023-03-27 ENCOUNTER — CLINICAL SUPPORT (OUTPATIENT)
Dept: REHABILITATION | Facility: HOSPITAL | Age: 48
End: 2023-03-27
Payer: COMMERCIAL

## 2023-03-27 DIAGNOSIS — M25.561 ACUTE PAIN OF RIGHT KNEE: Primary | ICD-10-CM

## 2023-03-27 DIAGNOSIS — M25.661 DECREASED ROM OF RIGHT KNEE: ICD-10-CM

## 2023-03-27 PROCEDURE — 97112 NEUROMUSCULAR REEDUCATION: CPT | Mod: CQ

## 2023-03-27 PROCEDURE — 97110 THERAPEUTIC EXERCISES: CPT | Mod: CQ

## 2023-03-27 NOTE — PROGRESS NOTES
"  Physical Therapy Daily Treatment Note     Name: Thomas Taveras  Clinic Number: [id number]    Therapy Diagnosis:   Encounter Diagnoses   Name Primary?    Right knee pain, unspecified chronicity Yes    Decreased ROM of right knee        Physician: Teodoro Marcus MD    Visit Date: 3/23/2023  Physician Orders: PT Eval and Treat   Medical Diagnosis from Referral: S83.511A (ICD-10-CM) - Complete tear of anterior cruciate ligament of right knee, initial encounter  Evaluation Date: 12/2/2022  Authorization Period Expiration: 12/01/2023  Plan of Care Expiration: 12/01/2023  Visit # / Visits authorized: 24/30    Time In: 300  Time Out: 400  Total Appointment Time (timed & untimed codes): 60 min      Precautions: Standard     Procedure:  Right anterior cruciate ligament reconstruction with bone patellar tendon bone autograft    Subjective     Pt reports: quad soreness after last visit. Knee has been feeling good working through TRX strength progressions at gym w/ good tolerance    He was compliant with home exercise program.  Response to previous treatment: HEP complaince  Functional change: ADLs w/ min complaint and some swelling    Pain: 0-3/10  Location: right knee      Objective     Date of surgery: 12/01/2022  POD 16 weeks 1 days    Range of Motion:   Knee Right Left   Active 2-0-125 2-0-135   Passive 2-0-130 2-0-135     Quad MVIC at 60 deg knee flexion 3/16/23  L = 55 kg  R = 15 kg (73% deficit)     Thomas received therapeutic exercises for 32 min to develop strength, endurance, ROM, flexibility, posture, and core stabilization including:     Patient education:  - swelling management  - avoiding aggravating activities!    Elliptical x6 min Lvl 5 for pain modulation/ tissue extensibility   Prone quad stretch 4x30"  SLR 2# x20 3" hold  SL hip abd 2x15   DL bridge GTB 2x20  Standing quad stretch 4x30"  Knee hammer strength 5# 2x10  Knee hammer strength 7.5# 2x8    Pt education on post op precautions, return to " "activity/exercise, signs of adverse effects of exercise    Held:    SL hip thrust 3xReps fail  LAQ on hammer strength 0# 4x15  Goblet Squats with mirror feedback 3x10 35#  [place] Single Leg Squat with blue sport cord 5x till burn  GTB clams 3x burn ea  Wall sit 3x30 sec - foot position   SL hip abduction 2# 3x15  Shuttle Squat Single Leg 2b 4x8  Leg press DL squats x20 100#  Leg press SL squats 3x10 60#     Thomas received the following manual therapy techniques: Joint mobilizations, Manual Lymphatic Drainage, and Soft tissue Mobilization were applied to the: R knee for 00 minutes, including:    Patellar mobs inferior / medial  Knee ROM assessment    Held:  Tibial IR mobilization grade III-IV      Thomas participated in neuromuscular re-education activities to improve: Balance, Coordination, Kinesthetic, Sense, Proprioception, and Posture for 28 minutes. The following activities were included:    SL bridge 3x10  TRX retrolunge 3x10 (cues for anterior tibial translation)  TRX pistol squat 3x10 (heel elevated, cues for knee flexion)  DL heel elevated squat 3x15 10# press out  SL squat heels elevated 3x10    Held:  Isometric quad at 60 deg 30 sec x5  Slant board squat w/ 25# 4x10  Retro sled pull 90# 3x40 ft  GTB standing clam 3x10x5 sec both sides  Side steps w/ GTB 2 laps w/ 25# pressout  Squats w/ 25# press out 3x10  3" step down 3x20      Home Exercises Provided and Patient Education Provided     Education provided:   - see above    Written Home Exercises Provided: Patient instructed to cont prior HEP.  Exercises were reviewed and Thomas was able to demonstrate them prior to the end of the session.  Thomas demonstrated good  understanding of the education provided.     See EMR under Patient Instructions for exercises provided  2/10/2023 .     Assessment     Pt continues to lack end range knee flexion, tolerating quad stretch well. Worked on DL / SL strength progression with cues for anterior tibial translation. Pt " tolerates progressions reporting training effect in quad, no inc in knee pain. Will re-assess next visit and progress accordingly.    Thomas Is progressing well towards his goals.   Pt prognosis is Excellent.     Pt will continue to benefit from skilled outpatient physical therapy to address the deficits listed in the problem list box on initial evaluation, provide pt/family education and to maximize pt's level of independence in the home and community environment.     Pt's spiritual, cultural and educational needs considered and pt agreeable to plan of care and goals.    Anticipated barriers to physical therapy: none    GOALS: Short Term Goals: 0-3 months  1.Report decreased R knee pain  < / =  1/10  to increase tolerance for amb  2. Increase knee ROM to 3-0-145 in order to be able to perform ADLs without difficulty.  3. Increase strength by 1/3 MMT grade in Quads to increase tolerance for ADL and work activities.  4. Able to amb w/o deviation or complaint  5. Pt to tolerate HEP to improve ROM and independence with ADL's     Long Term Goals: 3-9 months  1. Able to return to running linearly   2. Able to complete [place] return to sport testing  2.Patient goal: Return to [employer]  3.Increase strength to >/= 4+/5 in Quad and hip musculature to increase tolerance for ADL and work activities.  4. Pt will report at CJ level (20-40% impaired) on FOTO knee to demonstrate increase in LE function with every day tasks.     Plan     See POC in treatment section for evaluation; Recommend cont care 2x a week.    Андрей Jang, PT, DPT, SCS            .

## 2023-03-27 NOTE — PROGRESS NOTES
"  Physical Therapy Daily Treatment Note     Name: Thomas Taveras  Clinic Number: [id number]    Therapy Diagnosis:   Encounter Diagnoses   Name Primary?    Acute pain of right knee Yes    Decreased ROM of right knee        Physician: Teodoro Marcus MD    Visit Date: 3/27/2023  Physician Orders: PT Eval and Treat   Medical Diagnosis from Referral: S83.511A (ICD-10-CM) - Complete tear of anterior cruciate ligament of right knee, initial encounter  Evaluation Date: 12/2/2022  Authorization Period Expiration: 12/01/2023  Plan of Care Expiration: 12/01/2023  Visit # / Visits authorized: 23/30    Time In: 1050  Time Out: 1150  Total Appointment Time (timed & untimed codes): 60 min      Precautions: Standard     Procedure:  Right anterior cruciate ligament reconstruction with bone patellar tendon bone autograft    Subjective     Pt reports: no pain at present time   He was compliant with home exercise program.  Response to previous treatment: HEP complaince  Functional change: ADLs w/ min complaint and some swelling    Pain: 0-3/10  Location: right knee      Objective     Date of surgery: 12/01/2022  POD 15 weeks 4 days    Range of Motion:   Knee Right Left   Active 2-0-125 2-0-135   Passive 2-0-130 2-0-135     Quad MVIC at 60 deg knee flexion 3/16/23  L = 55 kg  R = 15 kg (73% deficit)     Thomas received therapeutic exercises for 50 min to develop strength, endurance, ROM, flexibility, posture, and core stabilization including:     Patient education:  - swelling management  - avoiding aggravating activities!    Elliptical Lvl 2-3 10' for increased ROM, circulation, and mm strength/endurance   Prone quad stretch 4x30"  SLR 1# 3x10/3" hold  Knee ext modified to end range 3x10/3" eccentric control 5#   B Iso lunge DB curls 15# 4x10  Leg press DL squats 3x10 100#  Leg press B SL squats 3x10 60#     Pt education on post op precautions, return to activity/exercise, signs of adverse effects of exercise    Held:    SL hip thrust " "3xReps fail  LAQ on hammer strength 0# 4x15  Goblet Squats with mirror feedback 3x10 35#  [place] Single Leg Squat with blue sport cord 5x till burn  GTB clams 3x burn ea  Wall sit 3x30 sec - foot position   SL hip abduction 2# 3x15  Shuttle Squat Single Leg 2b 4x8    Thomas received the following manual therapy techniques: Joint mobilizations, Manual Lymphatic Drainage, and Soft tissue Mobilization were applied to the: R knee for 00 minutes, including:    Patellar mobs inferior / medial  Knee ROM assessment    Held:  Tibial IR mobilization grade III-IV      Thomas participated in neuromuscular re-education activities to improve: Balance, Coordination, Kinesthetic, Sense, Proprioception, and Posture for 10 minutes. The following activities were included:  Foam SL balance re bounder 2x30  SL bridge 3x10    Not today   Side steps w/ GTB 2 laps w/ 25# pressout  Squats w/ 25# press out 3x10  3" step down 3x20      Home Exercises Provided and Patient Education Provided     Education provided:   - see above    Written Home Exercises Provided: Patient instructed to cont prior HEP.  Exercises were reviewed and Thomas was able to demonstrate them prior to the end of the session.  Thomas demonstrated good  understanding of the education provided.     See EMR under Patient Instructions for exercises provided  2/10/2023 .     Assessment   Pt with improved quad strength demonstrated by improved tolerance to open chain quad strengthening. Pt educated on importance of continued compliance w/ quad stretching and resistance training for quad at gym.    Thomas Is progressing well towards his goals.   Pt prognosis is Excellent.     Pt will continue to benefit from skilled outpatient physical therapy to address the deficits listed in the problem list box on initial evaluation, provide pt/family education and to maximize pt's level of independence in the home and community environment.     Pt's spiritual, cultural and educational needs " considered and pt agreeable to plan of care and goals.    Anticipated barriers to physical therapy: none    GOALS: Short Term Goals: 0-3 months  1.Report decreased R knee pain  < / =  1/10  to increase tolerance for amb  2. Increase knee ROM to 3-0-145 in order to be able to perform ADLs without difficulty.  3. Increase strength by 1/3 MMT grade in Quads to increase tolerance for ADL and work activities.  4. Able to amb w/o deviation or complaint  5. Pt to tolerate HEP to improve ROM and independence with ADL's     Long Term Goals: 3-9 months  1. Able to return to running linearly   2. Able to complete [place] return to sport testing  2.Patient goal: Return to [employer] [employer]  3.Increase strength to >/= 4+/5 in Quad and hip musculature to increase tolerance for ADL and work activities.  4. Pt will report at CJ level (20-40% impaired) on FOTO knee to demonstrate increase in LE function with every day tasks.     Plan     See POC in treatment section for evaluation; Recommend cont care 2x a week.    Dominick Hurley, PTA, STS            .

## 2023-03-31 ENCOUNTER — CLINICAL SUPPORT (OUTPATIENT)
Dept: REHABILITATION | Facility: HOSPITAL | Age: 48
End: 2023-03-31
Payer: COMMERCIAL

## 2023-03-31 DIAGNOSIS — M25.661 DECREASED ROM OF RIGHT KNEE: ICD-10-CM

## 2023-03-31 DIAGNOSIS — M25.561 ACUTE PAIN OF RIGHT KNEE: Primary | ICD-10-CM

## 2023-03-31 PROCEDURE — 97112 NEUROMUSCULAR REEDUCATION: CPT | Mod: CQ

## 2023-03-31 PROCEDURE — 97110 THERAPEUTIC EXERCISES: CPT | Mod: CQ

## 2023-03-31 NOTE — PROGRESS NOTES
"  Physical Therapy Daily Treatment Note     Name: Thomas Taveras  Clinic Number: [id number]    Therapy Diagnosis:   Encounter Diagnoses   Name Primary?    Acute pain of right knee Yes    Decreased ROM of right knee        Physician: Teodoro Marcus MD    Visit Date: 3/31/2023  Physician Orders: PT Eval and Treat   Medical Diagnosis from Referral: S83.511A (ICD-10-CM) - Complete tear of anterior cruciate ligament of right knee, initial encounter  Evaluation Date: 12/2/2022  Authorization Period Expiration: 12/01/2023  Plan of Care Expiration: 12/01/2023  Visit # / Visits authorized: 24/30    Time In: 0950  Time Out: 1050  Total Appointment Time (timed & untimed codes): 60 min      Precautions: Standard     Procedure:  Right anterior cruciate ligament reconstruction with bone patellar tendon bone autograft    Subjective     Pt reports: no pain at present time, but stating min tingling yesterday after a very busy day with work and sitting down for an extend period of time at diner.   He was compliant with home exercise program.  Response to previous treatment: HEP complaince  Functional change: ADLs w/ min complaint and some swelling    Pain: 0/10  Location: right knee      Objective     Date of surgery: 12/01/2022  POD 15 weeks 4 days    Range of Motion:   Knee Right Left   Active 2-0-125 2-0-135   Passive 2-0-130 2-0-135     Quad MVIC at 60 deg knee flexion 3/16/23  L = 55 kg  R = 15 kg (73% deficit)     Thomas received therapeutic exercises for 50 min to develop strength, endurance, ROM, flexibility, posture, and core stabilization including:     Patient education:  - swelling management  - avoiding aggravating activities!    Elliptical Lvl 2-3 10' for increased ROM, circulation, and mm strength/endurance -alternating fwd/bwd every minute.   Prone quad stretch 5x30"  SLR 2# 3x10/3" hold  Knee ext modified to end range 3x10/3" eccentric control 5#   B Iso lunge DB curls 15# 4x10  Leg press DL squats 3x15 100#  Leg " "press B SL squats 3x15 62.5#  Goblet Squats  3x15 35# 20" step        Held:  [place] Single Leg Squat with blue sport cord 5x till burn  GTB clams 3x burn ea  Wall sit 3x30 sec - foot position   SL hip abduction 2# 3x15  Shuttle Squat Single Leg 2b 4x8    Thomas received the following manual therapy techniques: Joint mobilizations, Manual Lymphatic Drainage, and Soft tissue Mobilization were applied to the: R knee for 00 minutes, including:    Patellar mobs inferior / medial  Knee ROM assessment    Held:  Tibial IR mobilization grade III-IV      Thomas participated in neuromuscular re-education activities to improve: Balance, Coordination, Kinesthetic, Sense, Proprioception, and Posture for 10 minutes. The following activities were included:  Foam SL balance re bounder 2x30  SL bridge 3x10    Not today   Side steps w/ GTB 2 laps w/ 25# pressout  Squats w/ 25# press out 3x10  3" step down 3x20      Home Exercises Provided and Patient Education Provided     Education provided:   - see above    Written Home Exercises Provided: Patient instructed to cont prior HEP.  Exercises were reviewed and Thomas was able to demonstrate them prior to the end of the session.  Thomas demonstrated good  understanding of the education provided.     See EMR under Patient Instructions for exercises provided  2/10/2023 .     Assessment   Pt with improved quad strength demonstrated by improved tolerance to open chain quad strengthening. Pt educated on importance of continued compliance w/ quad stretching and resistance training for quad at gym.    Thomas Is progressing well towards his goals.   Pt prognosis is Excellent.     Pt will continue to benefit from skilled outpatient physical therapy to address the deficits listed in the problem list box on initial evaluation, provide pt/family education and to maximize pt's level of independence in the home and community environment.     Pt's spiritual, cultural and educational needs considered and pt " agreeable to plan of care and goals.    Anticipated barriers to physical therapy: none    GOALS: Short Term Goals: 0-3 months  1.Report decreased R knee pain  < / =  1/10  to increase tolerance for amb  2. Increase knee ROM to 3-0-145 in order to be able to perform ADLs without difficulty.  3. Increase strength by 1/3 MMT grade in Quads to increase tolerance for ADL and work activities.  4. Able to amb w/o deviation or complaint  5. Pt to tolerate HEP to improve ROM and independence with ADL's     Long Term Goals: 3-9 months  1. Able to return to running linearly   2. Able to complete [place] return to sport testing  2.Patient goal: [facility]  3.Increase strength to >/= 4+/5 in Quad and hip musculature to increase tolerance for ADL and work activities.  4. Pt will report at CJ level (20-40% impaired) on FOTO knee to demonstrate increase in LE function with every day tasks.     Plan     See POC in treatment section for evaluation; Recommend cont care 2x a week.    Dominick Hurley, PTA, STS            .

## 2023-04-05 ENCOUNTER — CLINICAL SUPPORT (OUTPATIENT)
Dept: REHABILITATION | Facility: HOSPITAL | Age: 48
End: 2023-04-05
Payer: COMMERCIAL

## 2023-04-05 DIAGNOSIS — M25.661 DECREASED ROM OF RIGHT KNEE: ICD-10-CM

## 2023-04-05 DIAGNOSIS — M25.561 RIGHT KNEE PAIN, UNSPECIFIED CHRONICITY: Primary | ICD-10-CM

## 2023-04-05 PROCEDURE — 97112 NEUROMUSCULAR REEDUCATION: CPT

## 2023-04-05 PROCEDURE — 97530 THERAPEUTIC ACTIVITIES: CPT

## 2023-04-05 NOTE — PROGRESS NOTES
"  Physical Therapy Daily Treatment Note     Name: Thomas Taveras  Clinic Number: [id number]    Therapy Diagnosis:   Encounter Diagnoses   Name Primary?    Right knee pain, unspecified chronicity Yes    Decreased ROM of right knee      Physician: Teodoro Marcus MD    Visit Date: 4/5/2023  Physician Orders: PT Eval and Treat   Medical Diagnosis from Referral: S83.511A (ICD-10-CM) - Complete tear of anterior cruciate ligament of right knee, initial encounter  Evaluation Date: 12/2/2022  Authorization Period Expiration: 12/01/2023  Plan of Care Expiration: 12/01/2023  Visit # / Visits authorized: 25/30     Time In: 1300  Time Out: 1400  Total Appointment Time (timed & untimed codes): 60 min      Precautions: Standard     Procedure:  Right anterior cruciate ligament reconstruction with bone patellar tendon bone autograft    Subjective     Pt reports: No complaints and is functionally doing more. Knee is a little swollen.    FOTO IE - 52%  FOTO 2/17/23 - 61%  FOTO 4/5/23 - 77%  FOTO Goal - 81%    He was compliant with home exercise program.  Response to previous treatment: HEP complaince  Functional change: ADLs w/ min complaint and some swelling    Pain: 0/10  Location: right knee      Objective     Date of surgery: 12/01/2022    Range of Motion:   Knee Right Left   Active 2-0-125 2-0-135   Passive 2-0-130 2-0-135     Quad MVIC at 60 deg knee flexion 3/16/23  L = 55 kg  R = 15 kg (73% deficit)     Thomas received therapeutic exercises for 5 min to develop strength, endurance, ROM, flexibility, posture, and core stabilization including:     Patient education:  - swelling management  - avoiding aggravating activities!  - importance of quad activation      NEXT VISIT - Cont squat loading and knee extension loading      Held:  Elliptical Lvl 2-3 10' for increased ROM, circulation, and mm strength/endurance -alternating fwd/bwd every minute.   Prone quad stretch 5x30"  SLR 2# 3x10/3" hold  Knee ext modified to end range " "3x10/3" eccentric control 5#   B Iso lunge DB curls 15# 4x10  Leg press DL squats 3x15 100#  Leg press B SL squats 3x15 62.5#  Goblet Squats  3x15 35# 20" step   [place] Single Leg Squat with blue sport cord 5x till burn  GTB clams 3x burn ea  Wall sit 3x30 sec - foot position   SL hip abduction 2# 3x15  Shuttle Squat Single Leg 2b 4x8    Thomas received the following manual therapy techniques: Joint mobilizations, Manual Lymphatic Drainage, and Soft tissue Mobilization were applied to the: R knee for 00 minutes, including:    Held:  Patellar mobs inferior / medial  Knee ROM assessment  Tibial IR mobilization grade III-IV      Thomas participated in neuromuscular re-education activities to improve: Balance, Coordination, Kinesthetic, Sense, Proprioception, and Posture for 40 minutes. The following activities were included:    Hip activation:  GTB sidesteps x4 laps  GTB hip thrust 20x10 sec    Proprioception:  SLB hip abd GTB 2x10 ea tiffany  SLB hip ext GTB 2x10 ea tiffany  SLB hip abd blue pad 2x10 ea tiffany  SLB hip ext blue pad 2x10 ea tiffany    Quad activation:  Leg extension hammer 7.5lbs on R; 25lbs on L 5x8-12 reps ea      Thomas participated in dynamic functional therapeutic activities to improve functional performance for 15  minutes, including:    Squats 45# 2x10-15 reps  Squats 95# 3x5-8 reps      Home Exercises Provided and Patient Education Provided     Education provided:   - see above    Written Home Exercises Provided: Patient instructed to cont prior HEP.  Exercises were reviewed and Thomas was able to demonstrate them prior to the end of the session.  Thomas demonstrated good  understanding of the education provided.     See EMR under Patient Instructions for exercises provided  2/10/2023 .     Assessment     FOTO scores demonstrate cont functional improvement.    Cont to demonstrate quad activation / strength deficit with 7.5lbs to 25lbs difference noted on hammer leg extension. Does demonstrate improved quad " activation and loading within visit, but needs cont focus on this.    Thomas Is progressing well towards his goals.   Pt prognosis is Excellent.     Pt will continue to benefit from skilled outpatient physical therapy to address the deficits listed in the problem list box on initial evaluation, provide pt/family education and to maximize pt's level of independence in the home and community environment.     Pt's spiritual, cultural and educational needs considered and pt agreeable to plan of care and goals.    Anticipated barriers to physical therapy: none    GOALS: Short Term Goals: 0-3 months  1.Report decreased R knee pain  < / =  1/10  to increase tolerance for amb  2. Increase knee ROM to 3-0-145 in order to be able to perform ADLs without difficulty.  3. Increase strength by 1/3 MMT grade in Quads to increase tolerance for ADL and work activities.  4. Able to amb w/o deviation or complaint  5. Pt to tolerate HEP to improve ROM and independence with ADL's     Long Term Goals: 3-9 months  1. Able to return to running linearly   2. Able to complete [place] return to sport testing  2.Patient goal: Return to [employer]  3.Increase strength to >/= 4+/5 in Quad and hip musculature to increase tolerance for ADL and work activities.  4. Pt will report at CJ level (20-40% impaired) on FOTO knee to demonstrate increase in LE function with every day tasks.     Plan     See POC in treatment section for evaluation; Recommend cont care 2x a week.    YOLETTE MEDINA, PT, DPT, OCS

## 2023-04-05 NOTE — PROGRESS NOTES
"  Physical Therapy Daily Treatment Note     Name: Thomas Taveras  Clinic Number: [id number]    Therapy Diagnosis:   No diagnosis found.      Physician: Teodoro Marcus MD    Visit Date: 4/5/2023  Physician Orders: PT Eval and Treat   Medical Diagnosis from Referral: S83.511A (ICD-10-CM) - Complete tear of anterior cruciate ligament of right knee, initial encounter  Evaluation Date: 12/2/2022  Authorization Period Expiration: 12/01/2023  Plan of Care Expiration: 12/01/2023  Visit # / Visits authorized: 24/30 ***    Time In: 0950 ***  Time Out: 1050 ***  Total Appointment Time (timed & untimed codes): 60 min      Precautions: Standard     Procedure:  Right anterior cruciate ligament reconstruction with bone patellar tendon bone autograft    Subjective     Pt reports: *** no pain at present time, but stating min tingling yesterday after a very busy day with work and sitting down for an extend period of time at diner.     He was compliant with home exercise program.  Response to previous treatment: HEP complaince  Functional change: ADLs w/ min complaint and some swelling    Pain: 0/10  Location: right knee      Objective     Date of surgery: 12/01/2022  POD 15 weeks 4 days ***    Range of Motion:   Knee Right Left   Active 2-0-125 2-0-135   Passive 2-0-130 2-0-135     Quad MVIC at 60 deg knee flexion 3/16/23  L = 55 kg  R = 15 kg (73% deficit)     Thomas received therapeutic exercises for 50 min to develop strength, endurance, ROM, flexibility, posture, and core stabilization including:     Patient education:  - swelling management  - avoiding aggravating activities!    Elliptical Lvl 2-3 10' for increased ROM, circulation, and mm strength/endurance -alternating fwd/bwd every minute.   Prone quad stretch 5x30"  SLR 2# 3x10/3" hold  Knee ext modified to end range 3x10/3" eccentric control 5#   B Iso lunge DB curls 15# 4x10  Leg press DL squats 3x15 100#  Leg press B SL squats 3x15 62.5#  Goblet Squats  3x15 35# 20" step " "       Held:  [place] Single Leg Squat with blue sport cord 5x till burn  GTB clams 3x burn ea  Wall sit 3x30 sec - foot position   SL hip abduction 2# 3x15  Shuttle Squat Single Leg 2b 4x8    Thomas received the following manual therapy techniques: Joint mobilizations, Manual Lymphatic Drainage, and Soft tissue Mobilization were applied to the: R knee for 00 minutes, including:    Patellar mobs inferior / medial  Knee ROM assessment    Held:  Tibial IR mobilization grade III-IV      Thomas participated in neuromuscular re-education activities to improve: Balance, Coordination, Kinesthetic, Sense, Proprioception, and Posture for 10 minutes. The following activities were included:  Foam SL balance re bounder 2x30  SL bridge 3x10    Not today   Side steps w/ GTB 2 laps w/ 25# pressout  Squats w/ 25# press out 3x10  3" step down 3x20      Home Exercises Provided and Patient Education Provided     Education provided:   - see above    Written Home Exercises Provided: Patient instructed to cont prior HEP.  Exercises were reviewed and Thomas was able to demonstrate them prior to the end of the session.  Thomas demonstrated good  understanding of the education provided.     See EMR under Patient Instructions for exercises provided  2/10/2023 .     Assessment     ***    Pt with improved quad strength demonstrated by improved tolerance to open chain quad strengthening. Pt educated on importance of continued compliance w/ quad stretching and resistance training for quad at gym.    Thomas Is progressing well towards his goals.   Pt prognosis is Excellent.     Pt will continue to benefit from skilled outpatient physical therapy to address the deficits listed in the problem list box on initial evaluation, provide pt/family education and to maximize pt's level of independence in the home and community environment.     Pt's spiritual, cultural and educational needs considered and pt agreeable to plan of care and goals.    Anticipated " barriers to physical therapy: none    GOALS: Short Term Goals: 0-3 months  1.Report decreased R knee pain  < / =  1/10  to increase tolerance for amb  2. Increase knee ROM to 3-0-145 in order to be able to perform ADLs without difficulty.  3. Increase strength by 1/3 MMT grade in Quads to increase tolerance for ADL and work activities.  4. Able to amb w/o deviation or complaint  5. Pt to tolerate HEP to improve ROM and independence with ADL's     Long Term Goals: 3-9 months  1. Able to return to running linearly   2. Able to complete [place] return to sport testing  2.Patient goal: Return to [facility]  3.Increase strength to >/= 4+/5 in Quad and hip musculature to increase tolerance for ADL and work activities.  4. Pt will report at CJ level (20-40% impaired) on FOTO knee to demonstrate increase in LE function with every day tasks.     Plan     See POC in treatment section for evaluation; Recommend cont care 2x a week.    YOLETTE MEDINA, PT, DPT, OCS

## 2023-04-11 ENCOUNTER — CLINICAL SUPPORT (OUTPATIENT)
Dept: REHABILITATION | Facility: HOSPITAL | Age: 48
End: 2023-04-11
Payer: COMMERCIAL

## 2023-04-11 DIAGNOSIS — M25.661 DECREASED ROM OF RIGHT KNEE: ICD-10-CM

## 2023-04-11 DIAGNOSIS — M25.561 RIGHT KNEE PAIN, UNSPECIFIED CHRONICITY: Primary | ICD-10-CM

## 2023-04-11 PROCEDURE — 97112 NEUROMUSCULAR REEDUCATION: CPT

## 2023-04-11 PROCEDURE — 97110 THERAPEUTIC EXERCISES: CPT

## 2023-04-11 PROCEDURE — 97530 THERAPEUTIC ACTIVITIES: CPT

## 2023-04-11 NOTE — PROGRESS NOTES
Physical Therapy Daily Treatment Note     Name: Thomas Taveras  Clinic Number: [id number]    Therapy Diagnosis:   Encounter Diagnoses   Name Primary?    Right knee pain, unspecified chronicity Yes    Decreased ROM of right knee        Physician: Teodoro Marcus MD    Visit Date: 4/11/2023  Physician Orders: PT Eval and Treat   Medical Diagnosis from Referral: S83.511A (ICD-10-CM) - Complete tear of anterior cruciate ligament of right knee, initial encounter  Evaluation Date: 12/2/2022  Authorization Period Expiration: 12/01/2023  Plan of Care Expiration: 12/01/2023  Visit # / Visits authorized: 26/30     Time In: 1305  Time Out: 1400   Total Appointment Time (timed & untimed codes): 55 min      Precautions: Standard     Procedure:  Right anterior cruciate ligament reconstruction with bone patellar tendon bone autograft    Subjective     Pt reports: Knee is stiff today. No orther complaints and does not recall any pain post squatting last visit.    FOTO IE - 52%  FOTO 2/17/23 - 61%  FOTO 4/5/23 - 77%  FOTO Goal - 81%    He was compliant with home exercise program.  Response to previous treatment: HEP complaince  Functional change: ADLs w/ min complaint and some swelling    Pain: 0/10  Location: right knee      Objective     Date of surgery: 12/01/2022    Range of Motion:   Knee Right Left   Active 2-0-125 2-0-135   Passive 2-0-130 2-0-135     Quad MVIC at 60 deg knee flexion 3/16/23  L = 55 kg  R = 15 kg (73% deficit)     Thomas received therapeutic exercises for 20 min to develop strength, endurance, ROM, flexibility, posture, and core stabilization including:     Patient education:  - swelling management  - avoiding aggravating activities!  - importance of quad activation    Bike x6 min lvl 5 for LE ROM and stiffness  GTB sidesteps x4 laps  Knee flexion DL MATRIX #55 3x10      NEXT VISIT - Cont squat loading and knee extension loading      Held:  Elliptical Lvl 2-3 10' for increased ROM, circulation, and mm  "strength/endurance -alternating fwd/bwd every minute.   Prone quad stretch 5x30"  SLR 2# 3x10/3" hold  Knee ext modified to end range 3x10/3" eccentric control 5#   B Iso lunge DB curls 15# 4x10  Leg press DL squats 3x15 100#  Leg press B SL squats 3x15 62.5#  Goblet Squats  3x15 35# 20" step   [place] Single Leg Squat with blue sport cord 5x till burn  GTB clams 3x burn ea  Wall sit 3x30 sec - foot position   SL hip abduction 2# 3x15  Shuttle Squat Single Leg 2b 4x8    Thomas received the following manual therapy techniques: Joint mobilizations, Manual Lymphatic Drainage, and Soft tissue Mobilization were applied to the: R knee for 00 minutes, including:    Held:  Patellar mobs inferior / medial  Knee ROM assessment  Tibial IR mobilization grade III-IV      Thomas participated in neuromuscular re-education activities to improve: Balance, Coordination, Kinesthetic, Sense, Proprioception, and Posture for 10 minutes. The following activities were included:    Quad activation:  Knee ext SL MATRIX #15 4x15 ea  Knee ext DL MATRIX 35# 3x10      Held:  Hip activation:  GTB hip thrust 20x10 sec  Proprioception:  SLB hip abd GTB 2x10 ea tiffany  SLB hip ext GTB 2x10 ea tiffany  SLB hip abd blue pad 2x10 ea tiffany  SLB hip ext blue pad 2x10 ea tiffany      Thomas participated in dynamic functional therapeutic activities to improve functional performance for 25 minutes, including:    Squats 45# 2x10-15 reps  Squats 95# 2x5-10 reps  Squats 115# 2x5-8 reps      Home Exercises Provided and Patient Education Provided     Education provided:   - see above    Written Home Exercises Provided: Patient instructed to cont prior HEP.  Exercises were reviewed and Thomas was able to demonstrate them prior to the end of the session.  Thomas demonstrated good  understanding of the education provided.     See EMR under Patient Instructions for exercises provided  2/10/2023 .     Assessment     Cont to demonstrate quad activation / strength deficit. Cont to be " challenged well with squats and leg extensions. Able to increase weight on squats to 115# this week.    Does demonstrate improved quad activation and loading within visit, but needs cont focus on this.    Thomas Is progressing well towards his goals.   Pt prognosis is Excellent.     Pt will continue to benefit from skilled outpatient physical therapy to address the deficits listed in the problem list box on initial evaluation, provide pt/family education and to maximize pt's level of independence in the home and community environment.     Pt's spiritual, cultural and educational needs considered and pt agreeable to plan of care and goals.    Anticipated barriers to physical therapy: none    GOALS: Short Term Goals: 0-3 months  1.Report decreased R knee pain  < / =  1/10  to increase tolerance for amb  2. Increase knee ROM to 3-0-145 in order to be able to perform ADLs without difficulty.  3. Increase strength by 1/3 MMT grade in Quads to increase tolerance for ADL and work activities.  4. Able to amb w/o deviation or complaint  5. Pt to tolerate HEP to improve ROM and independence with ADL's     Long Term Goals: 3-9 months  1. Able to return to running linearly   2. Able to complete [place] return to sport testing  2.Patient goal: Return to [employer]  3.Increase strength to >/= 4+/5 in Quad and hip musculature to increase tolerance for ADL and work activities.  4. Pt will report at CJ level (20-40% impaired) on FOTO knee to demonstrate increase in LE function with every day tasks.     Plan     See POC in treatment section for evaluation; Recommend cont care 2x a week.    YOLETTE MEDINA, PT, DPT, OCS

## 2023-04-21 ENCOUNTER — CLINICAL SUPPORT (OUTPATIENT)
Dept: REHABILITATION | Facility: HOSPITAL | Age: 48
End: 2023-04-21
Payer: COMMERCIAL

## 2023-04-21 DIAGNOSIS — M25.661 DECREASED ROM OF RIGHT KNEE: ICD-10-CM

## 2023-04-21 DIAGNOSIS — M25.561 RIGHT KNEE PAIN, UNSPECIFIED CHRONICITY: Primary | ICD-10-CM

## 2023-04-21 PROCEDURE — 97530 THERAPEUTIC ACTIVITIES: CPT

## 2023-04-21 PROCEDURE — 97110 THERAPEUTIC EXERCISES: CPT

## 2023-04-21 PROCEDURE — 97112 NEUROMUSCULAR REEDUCATION: CPT

## 2023-04-21 NOTE — PROGRESS NOTES
Physical Therapy Daily Treatment Note     Name: Thomas Taveras  Clinic Number: [id number]    Therapy Diagnosis:   Encounter Diagnoses   Name Primary?    Right knee pain, unspecified chronicity Yes    Decreased ROM of right knee        Physician: Teodoro Marcus MD    Visit Date: 4/21/2023  Physician Orders: PT Eval and Treat   Medical Diagnosis from Referral: S83.511A (ICD-10-CM) - Complete tear of anterior cruciate ligament of right knee, initial encounter  Evaluation Date: 12/2/2022  Authorization Period Expiration: 12/01/2023  Plan of Care Expiration: 12/01/2023  Visit # / Visits authorized: 27/30    Time In: 1300  Time Out: 1400   Total Appointment Time (timed & untimed codes): 60 min      Precautions: Standard     Procedure:  Right anterior cruciate ligament reconstruction with bone patellar tendon bone autograft    Subjective     Pt reports: No complaints with regard to knee; will be going out of town next week post PT.    FOTO IE - 52%  FOTO 2/17/23 - 61%  FOTO 4/5/23 - 77%  FOTO Goal - 81%    He was compliant with home exercise program.  Response to previous treatment: HEP complaince  Functional change: ADLs w/ min complaint and some swelling    Pain: 0/10  Location: right knee      Objective     Date of surgery: 12/01/2022    Range of Motion:   Knee Right Left   Active 2-0-125 2-0-135   Passive 2-0-130 2-0-135     Quad MVIC at 60 deg knee flexion 3/16/23  L = 55 kg  R = 15 kg (73% deficit)     Thomas received therapeutic exercises for 15 min to develop strength, endurance, ROM, flexibility, posture, and core stabilization including:     Patient education:  - swelling management  - avoiding aggravating activities!  - importance of quad activation    [place]ault Bike x5 min LE ROM and stiffness  GTB sidesteps x4 laps  Knee flexion DL MATRIX #55 3x10      NEXT VISIT - VISITS and HEP      Held:  Elliptical Lvl 2-3 10' for increased ROM, circulation, and mm strength/endurance -alternating fwd/bwd every minute.  "  Prone quad stretch 5x30"  SLR 2# 3x10/3" hold  Knee ext modified to end range 3x10/3" eccentric control 5#   B Iso lunge DB curls 15# 4x10  Leg press DL squats 3x15 100#  Leg press B SL squats 3x15 62.5#  Goblet Squats  3x15 35# 20" step   [place] Single Leg Squat with blue sport cord 5x till burn  GTB clams 3x burn ea  Wall sit 3x30 sec - foot position   SL hip abduction 2# 3x15  Shuttle Squat Single Leg 2b 4x8    Thomas received the following manual therapy techniques: Joint mobilizations, Manual Lymphatic Drainage, and Soft tissue Mobilization were applied to the: R knee for 00 minutes, including:    Held:  Patellar mobs inferior / medial  Knee ROM assessment  Tibial IR mobilization grade III-IV      Thomas participated in neuromuscular re-education activities to improve: Balance, Coordination, Kinesthetic, Sense, Proprioception, and Posture for 20 minutes. The following activities were included:    Heel raised squats 3x10 - cue quads    Quad activation:  Knee ext DL MATRIX 45# 3x8-12  Knee ext SL MATRIX #15 3x15 R      Held:  Hip activation:  GTB hip thrust 20x10 sec  Proprioception:  SLB hip abd GTB 2x10 ea tiffany  SLB hip ext GTB 2x10 ea tiffany  SLB hip abd blue pad 2x10 ea tiffany  SLB hip ext blue pad 2x10 ea tiffany      Thomas participated in dynamic functional therapeutic activities to improve functional performance for 25 minutes, including:    Squats 45# x10-15 reps  Squats 95# x8-12 reps  Squats 115# 2x8-12 reps  Squats 145# 2x5-8 reps    Home Exercises Provided and Patient Education Provided     Education provided:   - see above    Written Home Exercises Provided: Patient instructed to cont prior HEP.  Exercises were reviewed and Thomas was able to demonstrate them prior to the end of the session.  Thomas demonstrated good  understanding of the education provided.     See EMR under Patient Instructions for exercises provided  2/10/2023 .     Assessment     Cont to demonstrate quad activation / strength deficit, though " improving understanding of quad loading mechanics. Cont to be challenged well with squats and leg extensions. Able to increase weight on squats to 145# this week and DL knee extension to 45#.    Needs cont focus on quad strength loading.    Thomas Is progressing well towards his goals.   Pt prognosis is Excellent.     Pt will continue to benefit from skilled outpatient physical therapy to address the deficits listed in the problem list box on initial evaluation, provide pt/family education and to maximize pt's level of independence in the home and community environment.     Pt's spiritual, cultural and educational needs considered and pt agreeable to plan of care and goals.    Anticipated barriers to physical therapy: none    GOALS: Short Term Goals: 0-3 months  1.Report decreased R knee pain  < / =  1/10  to increase tolerance for amb  2. Increase knee ROM to 3-0-145 in order to be able to perform ADLs without difficulty.  3. Increase strength by 1/3 MMT grade in Quads to increase tolerance for ADL and work activities.  4. Able to amb w/o deviation or complaint  5. Pt to tolerate HEP to improve ROM and independence with ADL's     Long Term Goals: 3-9 months  1. Able to return to running linearly   2. Able to complete [place] return to sport testing  2.[facility]: [facility]  3.Increase strength to >/= 4+/5 in Quad and hip musculature to increase tolerance for ADL and work activities.  4. Pt will report at CJ level (20-40% impaired) on FOTO knee to demonstrate increase in LE function with every day tasks.     Plan     See POC in treatment section for evaluation; Recommend cont care 2x a week.    YOLETTE MEDINA, PT, DPT, OCS

## 2023-04-25 ENCOUNTER — CLINICAL SUPPORT (OUTPATIENT)
Dept: REHABILITATION | Facility: HOSPITAL | Age: 48
End: 2023-04-25
Payer: COMMERCIAL

## 2023-04-25 DIAGNOSIS — M25.661 DECREASED ROM OF RIGHT KNEE: ICD-10-CM

## 2023-04-25 DIAGNOSIS — M25.561 RIGHT KNEE PAIN, UNSPECIFIED CHRONICITY: Primary | ICD-10-CM

## 2023-04-25 PROCEDURE — 97112 NEUROMUSCULAR REEDUCATION: CPT

## 2023-04-25 PROCEDURE — 97110 THERAPEUTIC EXERCISES: CPT

## 2023-04-25 NOTE — PROGRESS NOTES
Physical Therapy Daily Treatment Note     Name: Thomas Taveras  Clinic Number: [id number]    Therapy Diagnosis:   Encounter Diagnoses   Name Primary?    Right knee pain, unspecified chronicity Yes    Decreased ROM of right knee      Physician: Teodoro Marcus MD    Visit Date: 4/25/2023  Physician Orders: PT Eval and Treat   Medical Diagnosis from Referral: S83.511A (ICD-10-CM) - Complete tear of anterior cruciate ligament of right knee, initial encounter  Evaluation Date: 12/2/2022  Authorization Period Expiration: 12/01/2023  Plan of Care Expiration: 12/01/2023  Visit # / Visits authorized: 28/30     Time In: 1320  Time Out: 1400    Total Appointment Time (timed & untimed codes): 40 min      Precautions: Standard     Procedure:  Right anterior cruciate ligament reconstruction with bone patellar tendon bone autograft    Subjective     Pt reports: more mobile w/o increase in knee pain.    NEXT VISIT FOTO    FOTO IE - 52%  FOTO 2/17/23 - 61%  FOTO 4/5/23 - 77%  FOTO Goal - 81%    He was compliant with home exercise program.  Response to previous treatment: HEP complaince  Functional change: ADLs w/ min complaint and some swelling    Pain: 0/10  Location: right knee      Objective     Date of surgery: 12/01/2022    Range of Motion:   Knee Right Left   Active 2-0-125 2-0-135   Passive 2-0-130 2-0-135     Quad MVIC at 60 deg knee flexion 3/16/23  L = 55 kg  R = 15 kg (73% deficit)     Thomas received therapeutic exercises for 15 min to develop strength, endurance, ROM, flexibility, posture, and core stabilization including:     Patient education:  - swelling management  - avoiding aggravating activities!  - importance of quad activation    GTB sidesteps x4 laps  Sled pull 45lbs x4 laps  Quad stretch 5x30 sec    NEXT VISIT - VISITS and HEP      Held:  Knee flexion DL MATRIX #55 3x10  Elliptical Lvl 2-3 10' for increased ROM, circulation, and mm strength/endurance -alternating fwd/bwd every minute.   Prone quad stretch  "5x30"  SLR 2# 3x10/3" hold  Knee ext modified to end range 3x10/3" eccentric control 5#   B Iso lunge DB curls 15# 4x10  Leg press DL squats 3x15 100#  Leg press B SL squats 3x15 62.5#  Goblet Squats  3x15 35# 20" step   [place] Single Leg Squat with blue sport cord 5x till burn  GTB clams 3x burn ea  Wall sit 3x30 sec - foot position   SL hip abduction 2# 3x15  Shuttle Squat Single Leg 2b 4x8    Thomas received the following manual therapy techniques: Joint mobilizations, Manual Lymphatic Drainage, and Soft tissue Mobilization were applied to the: R knee for 00 minutes, including:    Held:  Patellar mobs inferior / medial  Knee ROM assessment  Tibial IR mobilization grade III-IV      Thomas participated in neuromuscular re-education activities to improve: Balance, Coordination, Kinesthetic, Sense, Proprioception, and Posture for 25 minutes. The following activities were included:    Quad activation:  Heel raised squats 2x10 ea  Heel raised Goblet squats 26lbs 2x10 ea    Knee ext SL MATRIX #25 3x8-12 R  Knee ext DL MATRIX 45# 4x8-12    ASLR 10x10 sec    Hip activation:  GTB hip thrust 26lbs 3x15      Held:  Proprioception:  SLB hip abd GTB 2x10 ea tiffany  SLB hip ext GTB 2x10 ea tiffany  SLB hip abd blue pad 2x10 ea tiffany  SLB hip ext blue pad 2x10 ea tiffany      Thomas participated in dynamic functional therapeutic activities to improve functional performance for 00 minutes, including:    Held:  Squats 45# x10-15 reps  Squats 95# x8-12 reps  Squats 115# 2x8-12 reps  Squats 145# 2x5-8 reps    Home Exercises Provided and Patient Education Provided     Education provided:   - see above    Written Home Exercises Provided: Patient instructed to cont prior HEP.  Exercises were reviewed and Thomas was able to demonstrate them prior to the end of the session.  Thomas demonstrated good  understanding of the education provided.     See EMR under Patient Instructions for exercises provided  2/10/2023 .     Assessment     Demonstrates improving " strength / activation of quads requiring increased challenge and leading to improved functional mobility. Cont to require focus on this.    Needs cont focus on quad strength loading.    Thomas Is progressing well towards his goals.   Pt prognosis is Excellent.     Pt will continue to benefit from skilled outpatient physical therapy to address the deficits listed in the problem list box on initial evaluation, provide pt/family education and to maximize pt's level of independence in the home and community environment.     Pt's spiritual, cultural and educational needs considered and pt agreeable to plan of care and goals.    Anticipated barriers to physical therapy: none    GOALS: Short Term Goals: 0-3 months  1.Report decreased R knee pain  < / =  1/10  to increase tolerance for amb  2. Increase knee ROM to 3-0-145 in order to be able to perform ADLs without difficulty.  3. Increase strength by 1/3 MMT grade in Quads to increase tolerance for ADL and work activities.  4. Able to amb w/o deviation or complaint  5. Pt to tolerate HEP to improve ROM and independence with ADL's     Long Term Goals: 3-9 months  1. Able to return to running linearly   2. Able to complete [place] return to sport testing  2.Patient goal: Return to [employer]  3.Increase strength to >/= 4+/5 in Quad and hip musculature to increase tolerance for ADL and work activities.  4. Pt will report at CJ level (20-40% impaired) on FOTO knee to demonstrate increase in LE function with every day tasks.     Plan     See POC in treatment section for evaluation; Recommend cont care 2x a week.    YOLETTE MEDINA, PT, DPT, OCS

## 2023-05-05 ENCOUNTER — CLINICAL SUPPORT (OUTPATIENT)
Dept: REHABILITATION | Facility: HOSPITAL | Age: 48
End: 2023-05-05
Payer: COMMERCIAL

## 2023-05-05 DIAGNOSIS — M25.561 RIGHT KNEE PAIN, UNSPECIFIED CHRONICITY: Primary | ICD-10-CM

## 2023-05-05 DIAGNOSIS — M25.661 DECREASED ROM OF RIGHT KNEE: ICD-10-CM

## 2023-05-05 PROCEDURE — 97110 THERAPEUTIC EXERCISES: CPT

## 2023-05-05 PROCEDURE — 97112 NEUROMUSCULAR REEDUCATION: CPT

## 2023-05-05 PROCEDURE — 97750 PHYSICAL PERFORMANCE TEST: CPT

## 2023-05-05 NOTE — PROGRESS NOTES
Physical Therapy Daily Treatment Note     Name: Thomas Taveras  Clinic Number: [id number]    Therapy Diagnosis:   Encounter Diagnoses   Name Primary?    Right knee pain, unspecified chronicity Yes    Decreased ROM of right knee        Physician: Teodoro Marcus MD    Visit Date: 5/5/2023  Physician Orders: PT Eval and Treat   Medical Diagnosis from Referral: S83.511A (ICD-10-CM) - Complete tear of anterior cruciate ligament of right knee, initial encounter  Evaluation Date: 12/2/2022  Authorization Period Expiration: 12/01/2023  Plan of Care Expiration: 12/01/2023  Visit # / Visits authorized: 29/30     Time In: 1400  Time Out: 1510  Total Appointment Time (timed & untimed codes): 70 min      Precautions: Standard     Procedure:  Right anterior cruciate ligament reconstruction with bone patellar tendon bone autograft    Subjective     Pt reports: Went [place] vacation and was in the [place] time. Felt slight tweak of knee, but no lingering effects. Cont to walk and go to gym . Working hard on strength.    FOTO IE - 52%  FOTO 2/17/23 - 61%  FOTO 4/5/23 - 77%  FOTO Goal - 81%    He was compliant with home exercise program.  Response to previous treatment: HEP complaince  Functional change: ADLs w/ min complaint and some swelling    Pain: 0/10  Location: right knee      Objective     Date of surgery: 12/01/2022    Range of Motion:   Knee Right Left   Active 2-0-125 2-0-135   Passive 2-0-130 2-0-135     Quad MVIC at 60 deg knee flexion 3/16/23  L = 55 kg  R = 15 kg (73% deficit)     Biodex Lower Extremity Strength LSI 5/5/2023: - Physical performance Test x20 min  Knee Extension   Knee Flexion     60 deg/sec 59.2% deficit 60 deg/sec 17.9% deficit   180 deg/sec 48.2% deficit 180 deg/sec 11.2% deficit   300 deg/sec 36.5% deficit 300 deg/sec 4.7% deficit      LSI Knee extension ~ 40-65%  LSI Knee flexion ~72-95%    Thomas received therapeutic exercises for 10 min to develop strength, endurance, ROM,  flexibility, posture, and core stabilization including:     Patient education:  - swelling management  - avoiding aggravating activities!  - importance of quad activation    GTB sidesteps x4 laps  Leg press # 3x10    NEXT VISIT - cont quad / squats      Held:  Sled pull 45lbs x4 laps  Quad stretch 5x30 sec  Knee flexion DL MATRIX #55 3x10    Thomas received the following manual therapy techniques: Joint mobilizations, Manual Lymphatic Drainage, and Soft tissue Mobilization were applied to the: R knee for 00 minutes, including:    Held:  Patellar mobs inferior / medial  Knee ROM assessment  Tibial IR mobilization grade III-IV      Thomas participated in neuromuscular re-education activities to improve: Balance, Coordination, Kinesthetic, Sense, Proprioception, and Posture for 40 minutes. The following activities were included:    Quad activation:  Heel raised Gob[place] squats 35lbs 4x10 ea  [place] pole nudge 3x10 ea  ASLR 5x fail  Knee ext DL -> SL eccentric MATRIX #25 3x burn  Leg press hover 100lbs 3x burn       Held:  Knee ext SL MATRIX #25 3x8-12 R  Knee ext DL MATRIX 45# 4x8-12  Hip activation:  GTB hip thrust 26lbs 3x15  Proprioception:  SLB hip abd GTB 2x10 ea tiffany  SLB hip ext GTB 2x10 ea tiffany  SLB hip abd blue pad 2x10 ea tiffany  SLB hip ext blue pad 2x10 ea tiffany      Thomas participated in dynamic functional therapeutic activities to improve functional performance for 00 minutes, including:    Held:  Squats 45# x10-15 reps  Squats 95# x8-12 reps  Squats 115# 2x8-12 reps  Squats 145# 2x5-8 reps    Home Exercises Provided and Patient Education Provided     Education provided:   - see above    Written Home Exercises Provided: Patient instructed to cont prior HEP.  Exercises were reviewed and Thomas was able to demonstrate them prior to the end of the session.  Thomas demonstrated good  understanding of the education provided.     See EMR under Patient Instructions for exercises provided  2/10/2023 .     Assessment      Biodex testing demonstrates improvements, but cont LSI deficits of quads. Previously recorded 73% deficit now ranges from 60-36% deficit.     LSI range of ~40-65% better for endurance, but lacking in strength / power. Requires consistent reminders and cuing for quad use as patient has difficulty in recruiting this musculature consistently with different exercises.    Needs cont focus on quad strength loading to improve function.    Thomas Is progressing well towards his goals.   Pt prognosis is Excellent.     Pt will continue to benefit from skilled outpatient physical therapy to address the deficits listed in the problem list box on initial evaluation, provide pt/family education and to maximize pt's level of independence in the home and community environment.     Pt's spiritual, cultural and educational needs considered and pt agreeable to plan of care and goals.    Anticipated barriers to physical therapy: none    GOALS: Short Term Goals: 0-3 months  1.Report decreased R knee pain  < / =  1/10  to increase tolerance for amb  2. Increase knee ROM to 3-0-145 in order to be able to perform ADLs without difficulty.  3. Increase strength by 1/3 MMT grade in Quads to increase tolerance for ADL and work activities.  4. Able to amb w/o deviation or complaint  5. Pt to tolerate HEP to improve ROM and independence with ADL's     Long Term Goals: 3-9 months  1. Able to return to running linearly   2. Able to complete [place] return to sport testing  2.Patient goal: Return to Jiu Jitsu  3.Increase strength to >/= 4+/5 in Quad and hip musculature to increase tolerance for ADL and work activities.  4. Pt will report at CJ level (20-40% impaired) on FOTO knee to demonstrate increase in LE function with every day tasks.     Plan     See POC in treatment section for evaluation; Recommend cont care 2x a week.    YOLETTE MEDINA, PT, DPT, OCS

## 2023-05-10 ENCOUNTER — CLINICAL SUPPORT (OUTPATIENT)
Dept: REHABILITATION | Facility: HOSPITAL | Age: 48
End: 2023-05-10
Payer: COMMERCIAL

## 2023-05-10 DIAGNOSIS — M25.661 DECREASED ROM OF RIGHT KNEE: ICD-10-CM

## 2023-05-10 DIAGNOSIS — M25.561 RIGHT KNEE PAIN, UNSPECIFIED CHRONICITY: Primary | ICD-10-CM

## 2023-05-10 PROCEDURE — 97110 THERAPEUTIC EXERCISES: CPT

## 2023-05-10 PROCEDURE — 97530 THERAPEUTIC ACTIVITIES: CPT

## 2023-05-10 PROCEDURE — 97112 NEUROMUSCULAR REEDUCATION: CPT

## 2023-05-10 NOTE — PLAN OF CARE
Physical Therapy Daily Treatment Note     Name: Thomas Taveras  Clinic Number: [id number]    Therapy Diagnosis:   Encounter Diagnoses   Name Primary?    Right knee pain, unspecified chronicity Yes    Decreased ROM of right knee        Physician: Teodoro Marcus MD    Visit Date: 5/10/2023  Physician Orders: PT Eval and Treat   Medical Diagnosis from Referral: S83.511A (ICD-10-CM) - Complete tear of anterior cruciate ligament of right knee, initial encounter  Evaluation Date: 12/2/2022  Authorization Period Expiration: 12/01/2023  Plan of Care Expiration: 12/01/2023  Visit # / Visits authorized: 30/30     Time In: 1400  Time Out: 1505  Total Appointment Time (timed & untimed codes): 65 min      Precautions: Standard     Procedure:  Right anterior cruciate ligament reconstruction with bone patellar tendon bone autograft    Subjective     Pt reports: Increasing quad isolation outside of clinic with , but still difficult to feel use of this all the time with exercises.    FOTO IE - 52%  FOTO 2/17/23 - 61%  FOTO 4/5/23 - 77%  FOTO Goal - 81%    He was compliant with home exercise program.  Response to previous treatment: HEP complaince  Functional change: ADLs w/ min complaint and some swelling    Pain: 0/10  Location: right knee      Objective     Date of surgery: 12/01/2022    Range of Motion:   Knee Right Left   Active 2-0-125 2-0-135   Passive 2-0-130 2-0-135     Quad MVIC at 60 deg knee flexion 3/16/23  L = 55 kg  R = 15 kg (73% deficit)     [employer]x Lower Extremity Strength LSI 5/5/2023: - Physical performance Test x20 min  Knee Extension   Knee Flexion     60 deg/sec 59.2% deficit 60 deg/sec 17.9% deficit   180 deg/sec 48.2% deficit 180 deg/sec 11.2% deficit   300 deg/sec 36.5% deficit 300 deg/sec 4.7% deficit      LSI Knee extension ~ 40-65%  LSI Knee flexion ~72-95%    Thomas received therapeutic exercises for 20 min to develop strength, endurance, ROM, flexibility, posture, and core stabilization  including:     Patient education:  - swelling management  - avoiding aggravating activities!  - importance of quad activation    GTB sidesteps x2 laps  Leg press SL 60# 3x10  Leg press # 3x10    BFR 80% 30:15:15:15 all below:  - LAQ  - SAQ      NEXT VISIT - BIODEX, squat, BFR      Held:  Sled pull 45lbs x4 laps  Quad stretch 5x30 sec  Knee flexion DL MATRIX #55 3x10    Thomas received the following manual therapy techniques: Joint mobilizations, Manual Lymphatic Drainage, and Soft tissue Mobilization were applied to the: R knee for 00 minutes, including:    Held:  Patellar mobs inferior / medial  Knee ROM assessment  Tibial IR mobilization grade III-IV      Thomas participated in neuromuscular re-education activities to improve: Balance, Coordination, Kinesthetic, Sense, Proprioception, and Posture for 15 minutes. The following activities were included:    Quad activation:  ASLR 5x fail  Leg press SL hover 60lbs 3x burn   Leg press DL hover 120lbs 3x burn       Held:  Heel raised [place] squats 35lbs 4x10 ea  [place] pole nudge 3x10 ea  Knee ext DL -> SL eccentric MATRIX #25 3x burn  Knee ext SL MATRIX #25 3x8-12 R  Knee ext DL MATRIX 45# 4x8-12  Hip activation:  GTB hip thrust 26lbs 3x15  Proprioception:  SLB hip abd GTB 2x10 ea tiffany  SLB hip ext GTB 2x10 ea tiffany  SLB hip abd blue pad 2x10 ea tiffany  SLB hip ext blue pad 2x10 ea tiffany      Thomas participated in dynamic functional therapeutic activities to improve functional performance for 30 minutes, including:    Squats 95# x8-12 reps  Squats 115# x8-12 reps  Squats 135# 2x8-12 reps  Squats 155# 2x8-12 reps    Hexbar Deadlift 145# 4x10    Home Exercises Provided and Patient Education Provided     Education provided:   - see above    Written Home Exercises Provided: Patient instructed to cont prior HEP.  Exercises were reviewed and Thomas was able to demonstrate them prior to the end of the session.  Thomas demonstrated good  understanding of the education provided.      See EMR under Patient Instructions for exercises provided 2/10/2023.     Assessment     Jina interventions well within visit.    Cont LSI deficits of quads and difficulty with recruitment in weightbearing positions. Continues to require consistent reminders and cuing for quad use, but slowly is able to jina increased challenge on interventions.    Needs cont focus on quad strength loading to improve function; Requires cont PT intervention to maximize function over time.    Thomas Is progressing well towards his goals.   Pt prognosis is Excellent.     Pt will continue to benefit from skilled outpatient physical therapy to address the deficits listed in the problem list box on initial evaluation, provide pt/family education and to maximize pt's level of independence in the home and community environment.     Pt's spiritual, cultural and educational needs considered and pt agreeable to plan of care and goals.    Anticipated barriers to physical therapy: none    GOALS: Short Term Goals: 0-3 months  1.Report decreased R knee pain  < / =  1/10  to increase tolerance for amb  2. Increase knee ROM to 3-0-145 in order to be able to perform ADLs without difficulty.  3. Increase strength by 1/3 MMT grade in Quads to increase tolerance for ADL and work activities.  4. Able to amb w/o deviation or complaint  5. Pt to tolerate HEP to improve ROM and independence with ADL's     Long Term Goals: 3-9 months  1. Able to return to running linearly   2. Able to complete [place] return to sport testing  2.Patient goal: Return to Jiu Jitsu  3.Increase strength to >/= 4+/5 in Quad and hip musculature to increase tolerance for ADL and work activities.  4. Pt will report at CJ level (20-40% impaired) on FOTO knee to demonstrate increase in LE function with every day tasks.     Plan     See POC in treatment section for evaluation; Recommend cont care 2x a week.    YOLETTE MEDINA, PT, DPT, OCS

## 2023-05-10 NOTE — PROGRESS NOTES
Physical Therapy Daily Treatment Note     Name: Thomas Taveras  Clinic Number: [id number]    Therapy Diagnosis:   Encounter Diagnoses   Name Primary?    Right knee pain, unspecified chronicity Yes    Decreased ROM of right knee        Physician: Teodoro Marcus MD    Visit Date: 5/10/2023  Physician Orders: PT Eval and Treat   Medical Diagnosis from Referral: S83.511A (ICD-10-CM) - Complete tear of anterior cruciate ligament of right knee, initial encounter  Evaluation Date: 12/2/2022  Authorization Period Expiration: 12/01/2023  Plan of Care Expiration: 12/01/2023  Visit # / Visits authorized: 30/30     Time In: 1400  Time Out: 1505  Total Appointment Time (timed & untimed codes): 65 min      Precautions: Standard     Procedure:  Right anterior cruciate ligament reconstruction with bone patellar tendon bone autograft    Subjective     Pt reports: Increasing quad isolation outside of clinic with , but still difficult to feel use of this all the time with exercises.    FOTO IE - 52%  FOTO 2/17/23 - 61%  FOTO 4/5/23 - 77%  FOTO Goal - 81%    He was compliant with home exercise program.  Response to previous treatment: HEP complaince  Functional change: ADLs w/ min complaint and some swelling    Pain: 0/10  Location: right knee      Objective     Date of surgery: 12/01/2022    Range of Motion:   Knee Right Left   Active 2-0-125 2-0-135   Passive 2-0-130 2-0-135     Quad MVIC at 60 deg knee flexion 3/16/23  L = 55 kg  R = 15 kg (73% deficit)     [employer] Lower Extremity Strength LSI 5/5/2023: - Physical performance Test x20 min  Knee Extension   Knee Flexion     60 deg/sec 59.2% deficit 60 deg/sec 17.9% deficit   180 deg/sec 48.2% deficit 180 deg/sec 11.2% deficit   300 deg/sec 36.5% deficit 300 deg/sec 4.7% deficit      LSI Knee extension ~ 40-65%  LSI Knee flexion ~72-95%    Thomas received therapeutic exercises for 20 min to develop strength, endurance, ROM, flexibility, posture, and core stabilization  including:     Patient education:  - swelling management  - avoiding aggravating activities!  - importance of quad activation    GTB sidesteps x2 laps  Leg press SL 60# 3x10  Leg press # 3x10    BFR 80% 30:15:15:15 all below:  - LAQ  - SAQ      NEXT VISIT - BIODEX, squat, BFR      Held:  Sled pull 45lbs x4 laps  Quad stretch 5x30 sec  Knee flexion DL MATRIX #55 3x10    Thomas received the following manual therapy techniques: Joint mobilizations, Manual Lymphatic Drainage, and Soft tissue Mobilization were applied to the: R knee for 00 minutes, including:    Held:  Patellar mobs inferior / medial  Knee ROM assessment  Tibial IR mobilization grade III-IV      Thomas participated in neuromuscular re-education activities to improve: Balance, Coordination, Kinesthetic, Sense, Proprioception, and Posture for 15 minutes. The following activities were included:    Quad activation:  ASLR 5x fail  Leg press SL hover 60lbs 3x burn   Leg press DL hover 120lbs 3x burn       Held:  Heel raised [place] squats 35lbs 4x10 ea  [place] pole nudge 3x10 ea  Knee ext DL -> SL eccentric MATRIX #25 3x burn  Knee ext SL MATRIX #25 3x8-12 R  Knee ext DL MATRIX 45# 4x8-12  Hip activation:  GTB hip thrust 26lbs 3x15  Proprioception:  SLB hip abd GTB 2x10 ea tiffany  SLB hip ext GTB 2x10 ea tiffany  SLB hip abd blue pad 2x10 ea tiffany  SLB hip ext blue pad 2x10 ea tiffany      Thomas participated in dynamic functional therapeutic activities to improve functional performance for 30 minutes, including:    Squats 95# x8-12 reps  Squats 115# x8-12 reps  Squats 135# 2x8-12 reps  Squats 155# 2x8-12 reps    Hexbar Deadlift 145# 4x10    Home Exercises Provided and Patient Education Provided     Education provided:   - see above    Written Home Exercises Provided: Patient instructed to cont prior HEP.  Exercises were reviewed and Thomas was able to demonstrate them prior to the end of the session.  Thomas demonstrated good  understanding of the education provided.      See EMR under Patient Instructions for exercises provided  2/10/2023 .     Assessment     Jina interventions well within visit.    Cont LSI deficits of quads and difficulty with recruitment in weightbearing positions. Continues to require consistent reminders and cuing for quad use, but slowly is able to jina increased challenge on interventions.    Needs cont focus on quad strength loading to improve function; Requires cont PT intervention to maximize function over time.    Thomas Is progressing well towards his goals.   Pt prognosis is Excellent.     Pt will continue to benefit from skilled outpatient physical therapy to address the deficits listed in the problem list box on initial evaluation, provide pt/family education and to maximize pt's level of independence in the home and community environment.     Pt's spiritual, cultural and educational needs considered and pt agreeable to plan of care and goals.    Anticipated barriers to physical therapy: none    GOALS: Short Term Goals: 0-3 months  1.Report decreased R knee pain  < / =  1/10  to increase tolerance for amb  2. Increase knee ROM to 3-0-145 in order to be able to perform ADLs without difficulty.  3. Increase strength by 1/3 MMT grade in Quads to increase tolerance for ADL and work activities.  4. Able to amb w/o deviation or complaint  5. Pt to tolerate HEP to improve ROM and independence with ADL's     Long Term Goals: 3-9 months  1. Able to return to running linearly   2. Able to complete [place] return to sport testing  2.Patient goal: Return to Jiu Jitsu  3.Increase strength to >/= 4+/5 in Quad and hip musculature to increase tolerance for ADL and work activities.  4. Pt will report at CJ level (20-40% impaired) on FOTO knee to demonstrate increase in LE function with every day tasks.     Plan     See POC in treatment section for evaluation; Recommend cont care 2x a week.    YOLETTE MEDINA, PT, DPT, OCS

## 2023-05-19 ENCOUNTER — CLINICAL SUPPORT (OUTPATIENT)
Dept: REHABILITATION | Facility: HOSPITAL | Age: 48
End: 2023-05-19
Payer: COMMERCIAL

## 2023-05-19 DIAGNOSIS — M25.561 RIGHT KNEE PAIN, UNSPECIFIED CHRONICITY: Primary | ICD-10-CM

## 2023-05-19 DIAGNOSIS — M25.661 DECREASED ROM OF RIGHT KNEE: ICD-10-CM

## 2023-05-19 PROCEDURE — 97530 THERAPEUTIC ACTIVITIES: CPT

## 2023-05-19 PROCEDURE — 97112 NEUROMUSCULAR REEDUCATION: CPT

## 2023-05-19 PROCEDURE — 97750 PHYSICAL PERFORMANCE TEST: CPT

## 2023-05-19 PROCEDURE — 97110 THERAPEUTIC EXERCISES: CPT

## 2023-05-19 NOTE — PROGRESS NOTES
Physical Therapy Daily Treatment Note     Name: Thomas Taveras  Clinic Number: [id number]    Therapy Diagnosis:   Encounter Diagnoses   Name Primary?    Right knee pain, unspecified chronicity Yes    Decreased ROM of right knee      Physician: Teodoro Marcus MD    Visit Date: 5/19/2023  Physician Orders: PT Eval and Treat   Medical Diagnosis from Referral: S83.511A (ICD-10-CM) - Complete tear of anterior cruciate ligament of right knee, initial encounter  Evaluation Date: 12/2/2022  Authorization Period Expiration: 12/01/2023  Plan of Care Expiration: 12/01/2023  Visit # / Visits authorized: 31/30     Time In: 1400   Time Out: 1505   Total Appointment Time (timed & untimed codes): 65 min      Precautions: Standard     Procedure:  Right anterior cruciate ligament reconstruction with bone patellar tendon bone autograft    Subjective     Pt reports: Able to walk 5 miles today w/o complaint. Cont to work on quad strength outside of PT visits.    FOTO IE - 52%  FOTO 2/17/23 - 61%  FOTO 4/5/23 - 77%  FOTO Goal - 81%    He was compliant with home exercise program.  Response to previous treatment: HEP complaince  Functional change: ADLs w/ min complaint and some swelling    Pain: 0/10  Location: right knee      Objective     Date of surgery: 12/01/2022    Range of Motion:   Knee Right Left   Active 2-0-125 2-0-135   Passive 2-0-130 2-0-135     Quad MVIC at 60 deg knee flexion 3/16/23  L = 55 kg  R = 15 kg (73% deficit)     Biodex Lower Extremity Strength LSI 5/5/2023: - Physical performance Test  Knee Extension   Knee Flexion     60 deg/sec 59.2% deficit 60 deg/sec 17.9% deficit   180 deg/sec 48.2% deficit 180 deg/sec 11.2% deficit   300 deg/sec 36.5% deficit 300 deg/sec 4.7% deficit      LSI Knee extension ~ 40-65%  LSI Knee flexion ~72-95%    Biodex Lower Extremity Strength I 5/19/2023: - Physical performance Test x20 min  Knee Extension   Knee Flexion     60 deg/sec 55.6% deficit 60 deg/sec 10.3% deficit   180  deg/sec 40.3% deficit 180 deg/sec 14.8% deficit   300 deg/sec 33.5% deficit 300 deg/sec 16.5% deficit      LSI Knee extension ~ 45-67%    Biodex Lower Extremity Strength comparison 5/5/2023 -> 5/19/2023:  Knee Extension   Knee Flexion     60 deg/sec 13.6% improvement 60 deg/sec 20.6% improvement   180 deg/sec 28.5% improvement 180 deg/sec 23.0% improvement   300 deg/sec 7.8% improvement 300 deg/sec 34.6% improvement        Thomas received therapeutic exercises for 20 min to develop strength, endurance, ROM, flexibility, posture, and core stabilization including:     Patient education:  - swelling management  - avoiding aggravating activities!  - importance of quad activation    Bike x6 min Lvl 3-5 for LE endurance and ROM  GTB sidesteps x2 laps    BFR 80% 30:15:15:15 all below:  - LAQ  - SAQ    NEXT VISIT - Squat, BFR      Held:  Leg press SL 60# 3x10  Leg press # 3x10  Sled pull 45lbs x4 laps  Quad stretch 5x30 sec  Knee flexion DL MATRIX #55 3x10    Thomas received the following manual therapy techniques: Joint mobilizations, Manual Lymphatic Drainage, and Soft tissue Mobilization were applied to the: R knee for 00 minutes, including:    Held:  Patellar mobs inferior / medial  Knee ROM assessment  Tibial IR mobilization grade III-IV      Thomas participated in neuromuscular re-education activities to improve: Balance, Coordination, Kinesthetic, Sense, Proprioception, and Posture for 10 minutes. The following activities were included:    Quad activation:  ASLR 5x fail  Leg press SL hover 60lbs 3x burn   Leg press DL hover 140lbs 3x burn       Held:  Heel raised Goblet squats 35lbs 4x10 ea  [place] pole nudge 3x10 ea  Knee ext DL -> SL eccentric MATRIX #25 3x burn  Knee ext SL MATRIX #25 3x8-12 R  Knee ext DL MATRIX 45# 4x8-12  Hip activation:  GTB hip thrust 26lbs 3x15  Proprioception:  SLB hip abd GTB 2x10 ea tiffany  SLB hip ext GTB 2x10 ea tiffany  SLB hip abd blue pad 2x10 ea tiffany  SLB hip ext blue pad 2x10 ea  tiffany      Thomas participated in dynamic functional therapeutic activities to improve functional performance for 15 minutes, including:    Squats 115# x8-15 reps  Squats 135# 3x8-15 reps    Held:  Squats 155# 2x8-12 reps  Hexbar Deadlift 145# 4x10      Home Exercises Provided and Patient Education Provided     Education provided:   - see above    Written Home Exercises Provided: Patient instructed to cont prior HEP.  Exercises were reviewed and Thomas was able to demonstrate them prior to the end of the session.  Thomas demonstrated good  understanding of the education provided.     See EMR under Patient Instructions for exercises provided  2/10/2023 .     Assessment     Wayne interventions well within visit.    Cont LSI deficits of quads and difficulty with recruitment in weightbearing positions, that is slowly improving, but cont to require reminders. On biodex testing today demonstrates improved quad strength within 2 weeks compared to last test, but LSI ~ the same as last assessment.    Needs cont focus on quad strength loading to improve function; Requires cont PT intervention to maximize function over time.    Thomas Is progressing well towards his goals.   Pt prognosis is Excellent.     Pt will continue to benefit from skilled outpatient physical therapy to address the deficits listed in the problem list box on initial evaluation, provide pt/family education and to maximize pt's level of independence in the home and community environment.     Pt's spiritual, cultural and educational needs considered and pt agreeable to plan of care and goals.    Anticipated barriers to physical therapy: none    GOALS: Short Term Goals: 0-3 months  1.Report decreased R knee pain  < / =  1/10  to increase tolerance for amb  2. Increase knee ROM to 3-0-145 in order to be able to perform ADLs without difficulty.  3. Increase strength by 1/3 MMT grade in Quads to increase tolerance for ADL and work activities.  4. Able to amb w/o  deviation or complaint  5. Pt to tolerate HEP to improve ROM and independence with ADL's     Long Term Goals: 3-9 months  1. Able to return to running linearly   2. Able to complete [place] return to sport testing  2.Patient goal: Return to Jiu Jitsu  3.Increase strength to >/= 4+/5 in Quad and hip musculature to increase tolerance for ADL and work activities.  4. Pt will report at CJ level (20-40% impaired) on FOTO knee to demonstrate increase in LE function with every day tasks.     Plan     See POC in treatment section for evaluation; Recommend cont care 2x a week.    YOLETTE MEDINA, PT, DPT, OCS

## 2023-05-23 ENCOUNTER — CLINICAL SUPPORT (OUTPATIENT)
Dept: REHABILITATION | Facility: HOSPITAL | Age: 48
End: 2023-05-23
Payer: COMMERCIAL

## 2023-05-23 ENCOUNTER — OFFICE VISIT (OUTPATIENT)
Dept: ORTHOPEDICS | Facility: CLINIC | Age: 48
End: 2023-05-23
Payer: COMMERCIAL

## 2023-05-23 VITALS
WEIGHT: 162.5 LBS | SYSTOLIC BLOOD PRESSURE: 126 MMHG | HEIGHT: 68 IN | HEART RATE: 64 BPM | DIASTOLIC BLOOD PRESSURE: 67 MMHG | BODY MASS INDEX: 24.63 KG/M2

## 2023-05-23 DIAGNOSIS — Z98.890 S/P ACL RECONSTRUCTION: Primary | ICD-10-CM

## 2023-05-23 DIAGNOSIS — M25.661 DECREASED ROM OF RIGHT KNEE: ICD-10-CM

## 2023-05-23 DIAGNOSIS — M25.561 RIGHT KNEE PAIN, UNSPECIFIED CHRONICITY: Primary | ICD-10-CM

## 2023-05-23 PROCEDURE — 1159F MED LIST DOCD IN RCRD: CPT | Mod: CPTII,S$GLB,, | Performed by: ORTHOPAEDIC SURGERY

## 2023-05-23 PROCEDURE — 99999 PR PBB SHADOW E&M-EST. PATIENT-LVL III: CPT | Mod: PBBFAC,,, | Performed by: ORTHOPAEDIC SURGERY

## 2023-05-23 PROCEDURE — 97112 NEUROMUSCULAR REEDUCATION: CPT

## 2023-05-23 PROCEDURE — 1159F PR MEDICATION LIST DOCUMENTED IN MEDICAL RECORD: ICD-10-PCS | Mod: CPTII,S$GLB,, | Performed by: ORTHOPAEDIC SURGERY

## 2023-05-23 PROCEDURE — 1160F PR REVIEW ALL MEDS BY PRESCRIBER/CLIN PHARMACIST DOCUMENTED: ICD-10-PCS | Mod: CPTII,S$GLB,, | Performed by: ORTHOPAEDIC SURGERY

## 2023-05-23 PROCEDURE — 3078F PR MOST RECENT DIASTOLIC BLOOD PRESSURE < 80 MM HG: ICD-10-PCS | Mod: CPTII,S$GLB,, | Performed by: ORTHOPAEDIC SURGERY

## 2023-05-23 PROCEDURE — 97530 THERAPEUTIC ACTIVITIES: CPT

## 2023-05-23 PROCEDURE — 3008F BODY MASS INDEX DOCD: CPT | Mod: CPTII,S$GLB,, | Performed by: ORTHOPAEDIC SURGERY

## 2023-05-23 PROCEDURE — 3008F PR BODY MASS INDEX (BMI) DOCUMENTED: ICD-10-PCS | Mod: CPTII,S$GLB,, | Performed by: ORTHOPAEDIC SURGERY

## 2023-05-23 PROCEDURE — 3074F PR MOST RECENT SYSTOLIC BLOOD PRESSURE < 130 MM HG: ICD-10-PCS | Mod: CPTII,S$GLB,, | Performed by: ORTHOPAEDIC SURGERY

## 2023-05-23 PROCEDURE — 3078F DIAST BP <80 MM HG: CPT | Mod: CPTII,S$GLB,, | Performed by: ORTHOPAEDIC SURGERY

## 2023-05-23 PROCEDURE — 1160F RVW MEDS BY RX/DR IN RCRD: CPT | Mod: CPTII,S$GLB,, | Performed by: ORTHOPAEDIC SURGERY

## 2023-05-23 PROCEDURE — 3074F SYST BP LT 130 MM HG: CPT | Mod: CPTII,S$GLB,, | Performed by: ORTHOPAEDIC SURGERY

## 2023-05-23 PROCEDURE — 99213 PR OFFICE/OUTPT VISIT, EST, LEVL III, 20-29 MIN: ICD-10-PCS | Mod: S$GLB,,, | Performed by: ORTHOPAEDIC SURGERY

## 2023-05-23 PROCEDURE — 99213 OFFICE O/P EST LOW 20 MIN: CPT | Mod: S$GLB,,, | Performed by: ORTHOPAEDIC SURGERY

## 2023-05-23 PROCEDURE — 97110 THERAPEUTIC EXERCISES: CPT

## 2023-05-23 PROCEDURE — 99999 PR PBB SHADOW E&M-EST. PATIENT-LVL III: ICD-10-PCS | Mod: PBBFAC,,, | Performed by: ORTHOPAEDIC SURGERY

## 2023-05-23 RX ORDER — TERBINAFINE HYDROCHLORIDE 250 MG/1
TABLET ORAL
COMMUNITY
Start: 2023-04-24

## 2023-05-23 NOTE — PROGRESS NOTES
"Patient ID:   Thomas Taveras is a 47 y.o. male.    Chief Complaint:   Approximately 6 months status post right ACL reconstruction with BTB autograft    HPI:   Patient is returning today for evaluation of the knee.  Overall, he states that he is doing well.  He does report some kneeling pain.  He reports 1 episode where he felt like his knee sort of gave way.    Medications:    Current Outpatient Medications:     emtricitabine-tenofovir 200-300 mg (TRUVADA) 200-300 mg Tab, Take 1 tablet by mouth., Disp: , Rfl:     terbinafine HCL (LAMISIL) 250 mg tablet, 1 TABLET BY MOUTH DAILY START ON THE 5TH OF EACH MONTH FOR 7 DAYS., Disp: , Rfl:     Allergies:  Review of patient's allergies indicates:  No Known Allergies    Vitals:  /67   Pulse 64   Ht 5' 8" (1.727 m)   Wt 73.7 kg (162 lb 7.7 oz)   BMI 24.70 kg/m²     Physical Examination:  Ortho Exam   Right knee exam:   Trace effusion.    Range of motion from 0-135.    Lachman's 1A.  Negative pivot.    Assessment:  1. S/P ACL reconstruction      Plan:  I have explained to the patient that it is normal to still have some strength deficits at this point.  He will continue to work on his strengthening.  In addition, I told him that considerable number of patients have kneeling pain after BTB harvest.  He will continue to work with physical therapy and then follow up with me in 3 months.       No follow-ups on file.          "

## 2023-05-23 NOTE — PROGRESS NOTES
Physical Therapy Daily Treatment Note     Name: Thomas Taveras  Clinic Number: [id number]    Therapy Diagnosis:   Encounter Diagnoses   Name Primary?    Right knee pain, unspecified chronicity Yes    Decreased ROM of right knee      Physician: Teodoro Marcus MD    Visit Date: 5/23/2023  Physician Orders: PT Eval and Treat   Medical Diagnosis from Referral: S83.511A (ICD-10-CM) - Complete tear of anterior cruciate ligament of right knee, initial encounter  Evaluation Date: 12/2/2022  Authorization Period Expiration: 12/01/2023  Plan of Care Expiration: 12/01/2023  Visit # / Visits authorized: 32/30     Time In: 1000  Time Out: 1100  Total Appointment Time (timed & untimed codes): 60 min      Precautions: Standard     Procedure:  Right anterior cruciate ligament reconstruction with bone patellar tendon bone autograft    Subjective     Pt reports: FU with MD who explained it is normal to have strength deficits and discomfort with kneeling. Will have another FU in 3 months. Advised to cont PT per MD.    FOTO IE - 52%  FOTO 2/17/23 - 61%  FOTO 4/5/23 - 77%  FOTO Goal - 81%    He was compliant with home exercise program.  Response to previous treatment: HEP complaince  Functional change: ADLs w/ min complaint and some swelling    Pain: 0/10  Location: right knee      Objective     Date of surgery: 12/01/2022    Range of Motion:   Knee Right Left   Active 2-0-125 2-0-135   Passive 2-0-130 2-0-135     Quad MVIC at 60 deg knee flexion 3/16/23  L = 55 kg  R = 15 kg (73% deficit)     Biodex Lower Extremity Strength I 5/5/2023: - Physical performance Test  Knee Extension   Knee Flexion     60 deg/sec 59.2% deficit 60 deg/sec 17.9% deficit   180 deg/sec 48.2% deficit 180 deg/sec 11.2% deficit   300 deg/sec 36.5% deficit 300 deg/sec 4.7% deficit      LSI Knee extension ~ 40-65%  LSI Knee flexion ~72-95%    Biodex Lower Extremity Strength [facility] 5/19/2023: - Physical performance Test x20 min  Knee Extension   Knee Flexion     60  deg/sec 55.6% deficit 60 deg/sec 10.3% deficit   180 deg/sec 40.3% deficit 180 deg/sec 14.8% deficit   300 deg/sec 33.5% deficit 300 deg/sec 16.5% deficit      LSI Knee extension ~ 45-67%    Biodex Lower Extremity Strength comparison 5/5/2023 -> 5/19/2023:  Knee Extension   Knee Flexion     60 deg/sec 13.6% improvement 60 deg/sec 20.6% improvement   180 deg/sec 28.5% improvement 180 deg/sec 23.0% improvement   300 deg/sec 7.8% improvement 300 deg/sec 34.6% improvement        Thomas received therapeutic exercises for 20 min to develop strength, endurance, ROM, flexibility, posture, and core stabilization including:     Patient education:  - importance of quad activation    GTB sidesteps x3 laps  Sled pull 45lbs x5 laps    BFR 80% 30:15:15:15 all below:  - LAQ w/ 5lbs  - SAQ w/ 5lbs      NEXT VISIT - Squat, BFR      Held:  Bike x6 min Lvl 3-5 for LE endurance and ROM  Leg press SL 60# 3x10  Leg press # 3x10    Quad stretch 5x30 sec  Knee flexion DL MATRIX #55 3x10    Thomas received the following manual therapy techniques: Joint mobilizations, Manual Lymphatic Drainage, and Soft tissue Mobilization were applied to the: R knee for 00 minutes, including:    Held:  Patellar mobs inferior / medial  Knee ROM assessment  Tibial IR mobilization grade III-IV      Thomas participated in neuromuscular re-education activities to improve: Balance, Coordination, Kinesthetic, Sense, Proprioception, and Posture for 10 minutes. The following activities were included:    Quad activation:  [place] pole nudge 3x10 ea  Leg press SL hover 60lbs 3x30 sec  Leg press DL hover 140lbs 3x30 sec      Held:  ASLR 5x fail  Heel raised Goblet squats 35lbs 4x10 ea  Knee ext DL -> SL eccentric MATRIX #25 3x burn  Knee ext SL MATRIX #25 3x8-12 R  Knee ext DL MATRIX 45# 4x8-12  Hip activation:  GTB hip thrust 26lbs 3x15  Proprioception:  SLB hip abd GTB 2x10 ea tiffany  SLB hip ext GTB 2x10 ea tiffany  SLB hip abd blue pad 2x10 ea tiffany  SLB hip ext blue  pad 2x10 ea tiffany      Thomas participated in dynamic functional therapeutic activities to improve functional performance for 30 minutes, including:    Squats 135# 2x8-15 reps  Squats 155# 2x8-15 reps  Squats 175# 2x8-15 reps      Held:  Squats 155# 2x8-12 reps  Hexbar Deadlift 145# 4x10      Home Exercises Provided and Patient Education Provided     Education provided:   - see above    Written Home Exercises Provided: Patient instructed to cont prior HEP.  Exercises were reviewed and Thomas was able to demonstrate them prior to the end of the session.  Thomas demonstrated good  understanding of the education provided.     See EMR under Patient Instructions for exercises provided  2/10/2023 .     Assessment     Wayne interventions well within visit. Requires multiple cues for adequate quad loading. Able to best reproduce quad loading with BFR and Leg press hover.    Needs cont focus on quad strength loading to improve function; Requires cont PT intervention to maximize function over time.    Thomas Is progressing well towards his goals.   Pt prognosis is Excellent.     Pt will continue to benefit from skilled outpatient physical therapy to address the deficits listed in the problem list box on initial evaluation, provide pt/family education and to maximize pt's level of independence in the home and community environment.     Pt's spiritual, cultural and educational needs considered and pt agreeable to plan of care and goals.    Anticipated barriers to physical therapy: none    GOALS: Short Term Goals: 0-3 months  1.Report decreased R knee pain  < / =  1/10  to increase tolerance for amb  2. Increase knee ROM to 3-0-145 in order to be able to perform ADLs without difficulty.  3. Increase strength by 1/3 MMT grade in Quads to increase tolerance for ADL and work activities.  4. Able to amb w/o deviation or complaint  5. Pt to tolerate HEP to improve ROM and independence with ADL's     Long Term Goals: 3-9 months  1. Able to  return to running linearly   2. Able to complete [place] return to sport testing  2.Patient goal: Return to Jiu Jitsu  3.Increase strength to >/= 4+/5 in Quad and hip musculature to increase tolerance for ADL and work activities.  4. Pt will report at CJ level (20-40% impaired) on FOTO knee to demonstrate increase in LE function with every day tasks.     Plan     See POC in treatment section for evaluation; Recommend cont care 2x a week.    YOLETTE MEDINA, PT, DPT, OCS

## 2023-05-30 ENCOUNTER — TELEPHONE (OUTPATIENT)
Dept: ORTHOPEDICS | Facility: CLINIC | Age: 48
End: 2023-05-30
Payer: COMMERCIAL

## 2023-05-30 NOTE — TELEPHONE ENCOUNTER
----- Message from Josesito Blum sent at 5/30/2023  2:14 PM CDT -----  Contact: Pt  .Type:  Needs Medical Advice    Who Called: Pt  Would the patient rather a call back or a response via [facility]? call  Best Call Back Number: [phone]  Additional Information:   Pt stated he needed a letter of medical necessity in order to continue his physical therapy.

## 2023-08-23 ENCOUNTER — OFFICE VISIT (OUTPATIENT)
Dept: ORTHOPEDICS | Facility: CLINIC | Age: 48
End: 2023-08-23
Payer: COMMERCIAL

## 2023-08-23 VITALS
SYSTOLIC BLOOD PRESSURE: 117 MMHG | WEIGHT: 162.5 LBS | HEART RATE: 80 BPM | HEIGHT: 68 IN | BODY MASS INDEX: 24.63 KG/M2 | DIASTOLIC BLOOD PRESSURE: 74 MMHG

## 2023-08-23 DIAGNOSIS — Z98.890 S/P ACL RECONSTRUCTION: Primary | ICD-10-CM

## 2023-08-23 PROCEDURE — 99213 PR OFFICE/OUTPT VISIT, EST, LEVL III, 20-29 MIN: ICD-10-PCS | Mod: S$GLB,,, | Performed by: ORTHOPAEDIC SURGERY

## 2023-08-23 PROCEDURE — 3074F PR MOST RECENT SYSTOLIC BLOOD PRESSURE < 130 MM HG: ICD-10-PCS | Mod: CPTII,S$GLB,, | Performed by: ORTHOPAEDIC SURGERY

## 2023-08-23 PROCEDURE — 3008F BODY MASS INDEX DOCD: CPT | Mod: CPTII,S$GLB,, | Performed by: ORTHOPAEDIC SURGERY

## 2023-08-23 PROCEDURE — 99999 PR PBB SHADOW E&M-EST. PATIENT-LVL III: ICD-10-PCS | Mod: PBBFAC,,, | Performed by: ORTHOPAEDIC SURGERY

## 2023-08-23 PROCEDURE — 1160F RVW MEDS BY RX/DR IN RCRD: CPT | Mod: CPTII,S$GLB,, | Performed by: ORTHOPAEDIC SURGERY

## 2023-08-23 PROCEDURE — 1160F PR REVIEW ALL MEDS BY PRESCRIBER/CLIN PHARMACIST DOCUMENTED: ICD-10-PCS | Mod: CPTII,S$GLB,, | Performed by: ORTHOPAEDIC SURGERY

## 2023-08-23 PROCEDURE — 3008F PR BODY MASS INDEX (BMI) DOCUMENTED: ICD-10-PCS | Mod: CPTII,S$GLB,, | Performed by: ORTHOPAEDIC SURGERY

## 2023-08-23 PROCEDURE — 99213 OFFICE O/P EST LOW 20 MIN: CPT | Mod: S$GLB,,, | Performed by: ORTHOPAEDIC SURGERY

## 2023-08-23 PROCEDURE — 3078F DIAST BP <80 MM HG: CPT | Mod: CPTII,S$GLB,, | Performed by: ORTHOPAEDIC SURGERY

## 2023-08-23 PROCEDURE — 99999 PR PBB SHADOW E&M-EST. PATIENT-LVL III: CPT | Mod: PBBFAC,,, | Performed by: ORTHOPAEDIC SURGERY

## 2023-08-23 PROCEDURE — 1159F PR MEDICATION LIST DOCUMENTED IN MEDICAL RECORD: ICD-10-PCS | Mod: CPTII,S$GLB,, | Performed by: ORTHOPAEDIC SURGERY

## 2023-08-23 PROCEDURE — 1159F MED LIST DOCD IN RCRD: CPT | Mod: CPTII,S$GLB,, | Performed by: ORTHOPAEDIC SURGERY

## 2023-08-23 PROCEDURE — 3078F PR MOST RECENT DIASTOLIC BLOOD PRESSURE < 80 MM HG: ICD-10-PCS | Mod: CPTII,S$GLB,, | Performed by: ORTHOPAEDIC SURGERY

## 2023-08-23 PROCEDURE — 3074F SYST BP LT 130 MM HG: CPT | Mod: CPTII,S$GLB,, | Performed by: ORTHOPAEDIC SURGERY

## 2023-08-23 RX ORDER — DOXYCYCLINE 100 MG/1
100 CAPSULE ORAL
COMMUNITY
Start: 2023-05-24

## 2023-08-23 RX ORDER — DOXYCYCLINE 100 MG/1
CAPSULE ORAL
COMMUNITY
Start: 2023-08-12

## 2023-08-23 RX ORDER — MUPIROCIN 20 MG/G
OINTMENT TOPICAL
COMMUNITY
Start: 2023-06-29

## 2023-08-25 NOTE — PROGRESS NOTES
"Patient ID:   Thomas Taveras is a 47 y.o. male.    Chief Complaint:   8m 22d s/p R ACL reconstruction with BTB autograft    HPI:   Patient is returning today almost 9 months out from his surgery.  He is doing well.  He has no complaints.  He has been performing exercises in the self-directed manner.  He denies any instability.    Medications:    Current Outpatient Medications:     doxycycline (MONODOX) 100 MG capsule, TAKE 2 CAPSULES BY MOUTH ONCE AS NEEDED (STD EXPOSURE) FOR UP TO 1 DOSE WITHIN 24 HOURS OF ACTIVITY, Disp: , Rfl:     doxycycline (VIBRAMYCIN) 100 MG Cap, Take 100 mg by mouth., Disp: , Rfl:     emtricitabine-tenofovir 200-300 mg (TRUVADA) 200-300 mg Tab, Take 1 tablet by mouth., Disp: , Rfl:     mupirocin (BACTROBAN) 2 % ointment, SMARTSIG:sparingly Topical Twice Daily, Disp: , Rfl:     terbinafine HCL (LAMISIL) 250 mg tablet, 1 TABLET BY MOUTH DAILY START ON THE 5TH OF EACH MONTH FOR 7 DAYS., Disp: , Rfl:     Allergies:  Review of patient's allergies indicates:  No Known Allergies    Vitals:  /74   Pulse 80   Ht 5' 8" (1.727 m)   Wt 73.7 kg (162 lb 7.7 oz)   BMI 24.70 kg/m²     Physical Examination:  Ortho Exam   Right knee exam:  Trace effusion.   ROM 0-135  Lachmans 1A. Negative pivot shift.    Assessment:  1. S/P ACL reconstruction      Plan:   The patient will continue to work on strengthening exercises for the right lower extremity.  He will continue to be cautious with cutting and pivoting activities.Follow-up at the 1 year brenda       No follow-ups on file.          "

## 2023-10-13 ENCOUNTER — PATIENT MESSAGE (OUTPATIENT)
Dept: ORTHOPEDICS | Facility: CLINIC | Age: 48
End: 2023-10-13
Payer: COMMERCIAL

## 2023-11-29 ENCOUNTER — TELEPHONE (OUTPATIENT)
Dept: ORTHOPEDICS | Facility: CLINIC | Age: 48
End: 2023-11-29
Payer: COMMERCIAL

## 2023-11-29 NOTE — TELEPHONE ENCOUNTER
----- Message from Maggie Vidal sent at 11/29/2023 11:34 AM CST -----  Type:  Needs Medical Advice    Who Called: pt  Would the patient rather a call back or a response via [facility]? call  Best Call Back Number:  [phone]  Additional Information: pt states he needs to reschedule for another day or time regarding post op on 12/20 already had an appt that was made a year ago that day

## 2023-12-01 ENCOUNTER — HOSPITAL ENCOUNTER (OUTPATIENT)
Dept: RADIOLOGY | Facility: HOSPITAL | Age: 48
Discharge: HOME | End: 2023-12-01
Attending: ORTHOPAEDIC SURGERY
Payer: COMMERCIAL

## 2023-12-01 ENCOUNTER — OFFICE VISIT (OUTPATIENT)
Dept: ORTHOPEDICS | Facility: CLINIC | Age: 48
End: 2023-12-01
Payer: COMMERCIAL

## 2023-12-01 VITALS
SYSTOLIC BLOOD PRESSURE: 120 MMHG | WEIGHT: 162.5 LBS | BODY MASS INDEX: 24.63 KG/M2 | HEART RATE: 80 BPM | HEIGHT: 68 IN | DIASTOLIC BLOOD PRESSURE: 80 MMHG

## 2023-12-01 DIAGNOSIS — M25.561 RIGHT KNEE PAIN, UNSPECIFIED CHRONICITY: ICD-10-CM

## 2023-12-01 DIAGNOSIS — M25.561 RIGHT KNEE PAIN, UNSPECIFIED CHRONICITY: Primary | ICD-10-CM

## 2023-12-01 DIAGNOSIS — M12.561 TRAUMATIC ARTHRITIS OF RIGHT KNEE: Primary | ICD-10-CM

## 2023-12-01 DIAGNOSIS — Z98.890 S/P ACL RECONSTRUCTION: ICD-10-CM

## 2023-12-01 PROCEDURE — 3008F PR BODY MASS INDEX (BMI) DOCUMENTED: ICD-10-PCS | Mod: CPTII,S$GLB,, | Performed by: ORTHOPAEDIC SURGERY

## 2023-12-01 PROCEDURE — 3079F DIAST BP 80-89 MM HG: CPT | Mod: CPTII,S$GLB,, | Performed by: ORTHOPAEDIC SURGERY

## 2023-12-01 PROCEDURE — 73562 X-RAY EXAM OF KNEE 3: CPT | Mod: TC,PN,RT

## 2023-12-01 PROCEDURE — 3074F PR MOST RECENT SYSTOLIC BLOOD PRESSURE < 130 MM HG: ICD-10-PCS | Mod: CPTII,S$GLB,, | Performed by: ORTHOPAEDIC SURGERY

## 2023-12-01 PROCEDURE — 3079F PR MOST RECENT DIASTOLIC BLOOD PRESSURE 80-89 MM HG: ICD-10-PCS | Mod: CPTII,S$GLB,, | Performed by: ORTHOPAEDIC SURGERY

## 2023-12-01 PROCEDURE — 73562 X-RAY EXAM OF KNEE 3: CPT | Mod: 26,RT,, | Performed by: RADIOLOGY

## 2023-12-01 PROCEDURE — 1160F RVW MEDS BY RX/DR IN RCRD: CPT | Mod: CPTII,S$GLB,, | Performed by: ORTHOPAEDIC SURGERY

## 2023-12-01 PROCEDURE — 1159F PR MEDICATION LIST DOCUMENTED IN MEDICAL RECORD: ICD-10-PCS | Mod: CPTII,S$GLB,, | Performed by: ORTHOPAEDIC SURGERY

## 2023-12-01 PROCEDURE — 1159F MED LIST DOCD IN RCRD: CPT | Mod: CPTII,S$GLB,, | Performed by: ORTHOPAEDIC SURGERY

## 2023-12-01 PROCEDURE — 99999 PR PBB SHADOW E&M-EST. PATIENT-LVL III: ICD-10-PCS | Mod: PBBFAC,,, | Performed by: ORTHOPAEDIC SURGERY

## 2023-12-01 PROCEDURE — 1160F PR REVIEW ALL MEDS BY PRESCRIBER/CLIN PHARMACIST DOCUMENTED: ICD-10-PCS | Mod: CPTII,S$GLB,, | Performed by: ORTHOPAEDIC SURGERY

## 2023-12-01 PROCEDURE — 99999 PR PBB SHADOW E&M-EST. PATIENT-LVL III: CPT | Mod: PBBFAC,,, | Performed by: ORTHOPAEDIC SURGERY

## 2023-12-01 PROCEDURE — 3074F SYST BP LT 130 MM HG: CPT | Mod: CPTII,S$GLB,, | Performed by: ORTHOPAEDIC SURGERY

## 2023-12-01 PROCEDURE — 3008F BODY MASS INDEX DOCD: CPT | Mod: CPTII,S$GLB,, | Performed by: ORTHOPAEDIC SURGERY

## 2023-12-01 PROCEDURE — 99214 OFFICE O/P EST MOD 30 MIN: CPT | Mod: S$GLB,,, | Performed by: ORTHOPAEDIC SURGERY

## 2023-12-01 PROCEDURE — 73562 XR KNEE 3 VIEW RIGHT: ICD-10-PCS | Mod: 26,RT,, | Performed by: RADIOLOGY

## 2023-12-01 PROCEDURE — 99214 PR OFFICE/OUTPT VISIT, EST, LEVL IV, 30-39 MIN: ICD-10-PCS | Mod: S$GLB,,, | Performed by: ORTHOPAEDIC SURGERY

## 2023-12-04 DIAGNOSIS — M12.561 TRAUMATIC ARTHRITIS OF RIGHT KNEE: Primary | ICD-10-CM

## 2023-12-05 NOTE — PROGRESS NOTES
"Patient ID:   Thomas Taveras is a 48 y.o. male.    Chief Complaint:   One year s/p R ACLR with BTB autograft    HPI:   Patient is returning for evaluation of his right knee. He is doing well overall but states that he still gets pain and swelling in the knee. He was noted to have Grade III chondromalacia of the knee at the time of surgery (trochlear, MFC). He continues to work on his lower extremity strengthneing. He denies any episodes of instability.     Medications:    Current Outpatient Medications:     doxycycline (MONODOX) 100 MG capsule, TAKE 2 CAPSULES BY MOUTH ONCE AS NEEDED (STD EXPOSURE) FOR UP TO 1 DOSE WITHIN 24 HOURS OF ACTIVITY, Disp: , Rfl:     doxycycline (VIBRAMYCIN) 100 MG Cap, Take 100 mg by mouth., Disp: , Rfl:     emtricitabine-tenofovir 200-300 mg (TRUVADA) 200-300 mg Tab, Take 1 tablet by mouth., Disp: , Rfl:     mupirocin (BACTROBAN) 2 % ointment, SMARTSIG:sparingly Topical Twice Daily, Disp: , Rfl:     terbinafine HCL (LAMISIL) 250 mg tablet, 1 TABLET BY MOUTH DAILY START ON THE 5TH OF EACH MONTH FOR 7 DAYS., Disp: , Rfl:     Allergies:  Review of patient's allergies indicates:  No Known Allergies    Vitals:  /80 (BP Location: Left arm, Patient Position: Sitting, BP Method: Medium (Automatic))   Pulse 80   Ht 5' 8" (1.727 m)   Wt 73.7 kg (162 lb 7.7 oz)   BMI 24.70 kg/m²     Physical Examination:  Ortho Exam   Right knee exam:  2+ effusion. Excellent patellar mobility.   ROM 0-130.  Lachmans 1A and symmetric. Negative pivot shift. Negative McMurrays    Imaging Studies:  I have ordered and independently reviewed the following imaging studies performed at [facility] today    X-Ray Knee 3 View Right  Narrative: EXAMINATION:  XR KNEE 3 VIEW RIGHT    CLINICAL HISTORY:  Pain in right knee    TECHNIQUE:  AP, lateral, and Merchant views of the right knee were performed.    COMPARISON:  10/25/2022.    FINDINGS:  The bones appear intact.  There is no evidence for acute fracture or bone " destruction.  There is no evidence for dislocation.  There are small osteophytes at the femorotibial and patellofemoral joints.  Joint spaces appear maintained.  There is a suprapatellar joint effusion.  Soft tissues are otherwise unremarkable.  Impression: Tricompartmental degenerative changes with small osteophytes present.  Joint spaces appear maintained.    Suprapatellar joint effusion.    Electronically signed by: Buck Day MD  Date:    12/01/2023  Time:    11:31    Assessment:  1. Traumatic arthritis of right knee    2. S/P ACL reconstruction      Plan:  I have reviewed the findings with Mr. Taveras in detail. Given the amount of arthritic change present, I would recommend aspiration and Euflexxa injection. He will return at a later date to start the series.     Orders Placed This Encounter    Prior authorization Order     No follow-ups on file.

## 2023-12-07 ENCOUNTER — TELEPHONE (OUTPATIENT)
Dept: ORTHOPEDICS | Facility: CLINIC | Age: 48
End: 2023-12-07
Payer: COMMERCIAL

## 2023-12-07 NOTE — TELEPHONE ENCOUNTER
----- Message from Tejinder Quintana sent at 12/7/2023 10:42 AM CST -----  Type:  Patient Returning Call    Who Called:pt   Who Left Message for Patient:maddy  Does the patient know what this is regarding?:returning a call  Would the patient rather a call back or a response via [facility]? Call  Best Call Back Number: [phone]  Additional Information:

## 2023-12-15 ENCOUNTER — OFFICE VISIT (OUTPATIENT)
Dept: ORTHOPEDICS | Facility: CLINIC | Age: 48
End: 2023-12-15
Payer: COMMERCIAL

## 2023-12-15 VITALS
DIASTOLIC BLOOD PRESSURE: 80 MMHG | HEIGHT: 68 IN | BODY MASS INDEX: 24.63 KG/M2 | SYSTOLIC BLOOD PRESSURE: 121 MMHG | WEIGHT: 162.5 LBS | HEART RATE: 80 BPM

## 2023-12-15 DIAGNOSIS — M12.561 TRAUMATIC ARTHRITIS OF RIGHT KNEE: Primary | ICD-10-CM

## 2023-12-15 PROCEDURE — 99999 PR PBB SHADOW E&M-EST. PATIENT-LVL III: ICD-10-PCS | Mod: PBBFAC,,, | Performed by: ORTHOPAEDIC SURGERY

## 2023-12-15 PROCEDURE — 99499 UNLISTED E&M SERVICE: CPT | Mod: S$GLB,,, | Performed by: ORTHOPAEDIC SURGERY

## 2023-12-15 PROCEDURE — 20610 LARGE JOINT ASPIRATION/INJECTION: R KNEE: ICD-10-PCS | Mod: RT,S$GLB,, | Performed by: ORTHOPAEDIC SURGERY

## 2023-12-15 PROCEDURE — 20610 DRAIN/INJ JOINT/BURSA W/O US: CPT | Mod: RT,S$GLB,, | Performed by: ORTHOPAEDIC SURGERY

## 2023-12-15 PROCEDURE — 99499 NO LOS: ICD-10-PCS | Mod: S$GLB,,, | Performed by: ORTHOPAEDIC SURGERY

## 2023-12-15 PROCEDURE — 99999 PR PBB SHADOW E&M-EST. PATIENT-LVL III: CPT | Mod: PBBFAC,,, | Performed by: ORTHOPAEDIC SURGERY

## 2023-12-16 NOTE — PROCEDURES
Large Joint Aspiration/Injection: R knee    Date/Time: 12/15/2023 11:00 AM    Performed by: Teodoro Marcus MD  Authorized by: Teodoro Marcus MD    Consent Done?:  Yes (Verbal)  Indications:  Pain and joint swelling  Site marked: the procedure site was marked    Timeout: prior to procedure the correct patient, procedure, and site was verified    Prep: patient was prepped and draped in usual sterile fashion      Local anesthesia used?: Yes    Anesthesia:  Local infiltration  Local anesthetic:  Topical anesthetic and lidocaine 1% without epinephrine  Anesthetic total (ml):  8      Details:  Needle Size:  22 G and 18 G  Ultrasonic Guidance for needle placement?: No    Approach:  Lateral  Location:  Knee  Site:  R knee  Medications:  20 mg sodium hyaluronate (EUFLEXXA) 10 mg/mL(mw 2.4 -3.6 million)  Aspirate amount (mL):  22  Aspirate:  Yellow  Patient tolerance:  Patient tolerated the procedure well with no immediate complications

## 2023-12-19 ENCOUNTER — TELEPHONE (OUTPATIENT)
Dept: ORTHOPEDICS | Facility: CLINIC | Age: 48
End: 2023-12-19
Payer: COMMERCIAL

## 2023-12-19 NOTE — TELEPHONE ENCOUNTER
----- Message from Michelle Macias sent at 12/19/2023 11:23 AM CST -----  Pt Requesting  a later Appointment     Pt is requesting a later appointment time (that our scheduling dept cannot Reschedule.)    Who called: pt  Initial appt date: 12/20/23  When pt wants appt: 12/20/23  Reason: pt said he have another doctor's appt  Best call back #:  [phone]  Additional notes: pt would like his appt to start at a later time starting from 12pm or rescheduled to 12/22/23

## 2023-12-20 ENCOUNTER — OFFICE VISIT (OUTPATIENT)
Dept: ORTHOPEDICS | Facility: CLINIC | Age: 48
End: 2023-12-20
Payer: COMMERCIAL

## 2023-12-20 VITALS — HEIGHT: 68 IN | BODY MASS INDEX: 24.7 KG/M2

## 2023-12-20 DIAGNOSIS — M12.561 TRAUMATIC ARTHRITIS OF RIGHT KNEE: Primary | ICD-10-CM

## 2023-12-20 DIAGNOSIS — Z98.890 S/P ACL RECONSTRUCTION: ICD-10-CM

## 2023-12-20 PROCEDURE — 99499 NO LOS: ICD-10-PCS | Mod: S$GLB,,, | Performed by: ORTHOPAEDIC SURGERY

## 2023-12-20 PROCEDURE — 99499 UNLISTED E&M SERVICE: CPT | Mod: S$GLB,,, | Performed by: ORTHOPAEDIC SURGERY

## 2023-12-20 PROCEDURE — 99999 PR PBB SHADOW E&M-EST. PATIENT-LVL III: ICD-10-PCS | Mod: PBBFAC,,, | Performed by: ORTHOPAEDIC SURGERY

## 2023-12-20 PROCEDURE — 99999 PR PBB SHADOW E&M-EST. PATIENT-LVL III: CPT | Mod: PBBFAC,,, | Performed by: ORTHOPAEDIC SURGERY

## 2023-12-20 PROCEDURE — 20610 LARGE JOINT ASPIRATION/INJECTION: R KNEE: ICD-10-PCS | Mod: RT,S$GLB,, | Performed by: ORTHOPAEDIC SURGERY

## 2023-12-20 PROCEDURE — 20610 DRAIN/INJ JOINT/BURSA W/O US: CPT | Mod: RT,S$GLB,, | Performed by: ORTHOPAEDIC SURGERY

## 2023-12-20 NOTE — PROCEDURES
Large Joint Aspiration/Injection: R knee    Date/Time: 12/20/2023 3:00 PM    Performed by: Teodoro Marcus MD  Authorized by: Teodoro Marcus MD    Consent Done?:  Yes (Verbal)  Indications:  Arthritis  Site marked: the procedure site was marked    Timeout: prior to procedure the correct patient, procedure, and site was verified    Prep: patient was prepped and draped in usual sterile fashion      Local anesthesia used?: Yes    Local anesthetic:  Topical anesthetic    Details:  Needle Size:  22 G  Ultrasonic Guidance for needle placement?: No    Approach:  Lateral  Location:  Knee  Site:  R knee  Medications:  20 mg sodium hyaluronate (EUFLEXXA) 10 mg/mL(mw 2.4 -3.6 million)  Patient tolerance:  Patient tolerated the procedure well with no immediate complications

## 2024-01-02 ENCOUNTER — OFFICE VISIT (OUTPATIENT)
Dept: ORTHOPEDICS | Facility: CLINIC | Age: 49
End: 2024-01-02
Payer: COMMERCIAL

## 2024-01-02 VITALS
HEART RATE: 73 BPM | WEIGHT: 167 LBS | SYSTOLIC BLOOD PRESSURE: 126 MMHG | BODY MASS INDEX: 25.31 KG/M2 | DIASTOLIC BLOOD PRESSURE: 85 MMHG | HEIGHT: 68 IN

## 2024-01-02 DIAGNOSIS — Z98.890 S/P ACL RECONSTRUCTION: ICD-10-CM

## 2024-01-02 DIAGNOSIS — M12.561 TRAUMATIC ARTHRITIS OF RIGHT KNEE: Primary | ICD-10-CM

## 2024-01-02 PROCEDURE — 99999 PR PBB SHADOW E&M-EST. PATIENT-LVL III: CPT | Mod: PBBFAC,,, | Performed by: ORTHOPAEDIC SURGERY

## 2024-01-02 PROCEDURE — 20610 DRAIN/INJ JOINT/BURSA W/O US: CPT | Mod: RT,S$GLB,, | Performed by: ORTHOPAEDIC SURGERY

## 2024-01-02 PROCEDURE — 99499 UNLISTED E&M SERVICE: CPT | Mod: S$GLB,,, | Performed by: ORTHOPAEDIC SURGERY

## 2024-01-02 NOTE — PROCEDURES
Large Joint Aspiration/Injection: R knee    Date/Time: 1/2/2024 11:15 AM    Performed by: Teodoro Marcus MD  Authorized by: Teodoro Marcus MD    Consent Done?:  Yes (Verbal)  Indications:  Arthritis  Site marked: the procedure site was marked    Timeout: prior to procedure the correct patient, procedure, and site was verified    Prep: patient was prepped and draped in usual sterile fashion      Local anesthesia used?: Yes    Local anesthetic:  Topical anesthetic    Details:  Needle Size:  22 G  Ultrasonic Guidance for needle placement?: No    Approach:  Lateral  Location:  Knee  Site:  R knee  Medications:  20 mg sodium hyaluronate (EUFLEXXA) 10 mg/mL(mw 2.4 -3.6 million)  Patient tolerance:  Patient tolerated the procedure well with no immediate complications

## 2024-01-12 ENCOUNTER — CLINICAL SUPPORT (OUTPATIENT)
Dept: REHABILITATION | Facility: HOSPITAL | Age: 49
End: 2024-01-12
Payer: COMMERCIAL

## 2024-01-12 DIAGNOSIS — Z98.890 S/P ACL RECONSTRUCTION: ICD-10-CM

## 2024-01-12 DIAGNOSIS — M25.561 RIGHT KNEE PAIN, UNSPECIFIED CHRONICITY: Primary | ICD-10-CM

## 2024-01-12 DIAGNOSIS — M12.561 TRAUMATIC ARTHRITIS OF RIGHT KNEE: ICD-10-CM

## 2024-01-12 DIAGNOSIS — M25.661 DECREASED ROM OF RIGHT KNEE: ICD-10-CM

## 2024-01-12 PROCEDURE — 97161 PT EVAL LOW COMPLEX 20 MIN: CPT

## 2024-01-12 PROCEDURE — 97014 ELECTRIC STIMULATION THERAPY: CPT

## 2024-01-12 PROCEDURE — 97112 NEUROMUSCULAR REEDUCATION: CPT

## 2024-01-12 NOTE — PLAN OF CARE
[facility]   Physical Therapy Initial Evaluation      Name: Thomas Taveras  Clinic Number: [id number]    Therapy Diagnosis:   Encounter Diagnoses   Name Primary?    Traumatic arthritis of right knee     S/P ACL reconstruction     Right knee pain, unspecified chronicity Yes    Decreased ROM of right knee         Physician: Teodoro Marcus MD    Physician Orders: PT Eval and Treat   Medical Diagnosis from Referral:     M12.561 (ICD-10-CM) - Traumatic arthritis of right knee   Z98.890 (ICD-10-CM) - S/P ACL reconstruction     Evaluation Date: 1/12/2024  Authorization Period Expiration: 01/01/2025   Plan of Care Expiration: 5/12/2024  Progress Note Due: 3/12/2024  Visit # / Visits authorized: 1/ 1   FOTO: 1/3    Precautions: Standard     Time In: 1430  Time Out: 1550  Total Appointment Time (timed & untimed codes): 80 minutes    Subjective     Date of surgery: 12/1/2022  POD: 1 year and 1 month    History of current condition - Thomas reports:     Patient is >1 year s/p R ACLR. Last year he ran out of visits from insurance and had to perform PT independently. He reports continued discomfort and reduced activity level. Though he largely feels good with ADLs, he reports stiffness thu with prolonged sitting, swelling post activities, and pain with things like stairs. He continues to work out 3x a week with his  with some lower body incorporation of activities. He has done some light jogging with , but has yet to return to higher level activities like running, jumping, or jiu jitsu.    He has had x3 Euflexxa injections since last seen and has been referred to PT for Biodex testing to help determine where he is at for return to activities.    Falls: 0    Imaging: see imaging    Prior Therapy: yes last visit 5/23/2023  Social History: lives with their spouse  Occupation: see intake  Prior Level of Function: prior to ACLR kisha sanford  Current Level of Function: able to  work out 3x a week; has yet to return to running, jumping, cutting,     Pain:  Current 1/10, worst 3/10, best 0/10   Location: R knee  Description: Aching, Dull, Tight, Sharp, and Swollen  Aggravating Factors: Sitting, Getting out of bed/chair, and Stairs / workouts  Easing Factors: ice, rest, and injection    Patients goals: return to sport, running, jumping     Medical History:   No past medical history on file.    Surgical History:   Thomas Taveras  has a past surgical history that includes Knee arthroscopy w/ ACL reconstruction (Right, 12/1/2022) and Chondroplasty (Right, 12/1/2022).    Medications:   Thomas has a current medication list which includes the following prescription(s): doxycycline, doxycycline, emtricitabine-tenofovir 200-300 mg, mupirocin, and terbinafine hcl.    Allergies:   Review of patient's allergies indicates:  No Known Allergies     Objective      Observation / Gait: WNL    Posture: WNL    Sensation: WNL    Edema: mild to mod thu suprapatellar > around joint line.    Range of Motion (Passive):   Knee Right  Left    Flexion 140 150   Extension +5 +5     Range of Motion (Active):   Knee Right  Left    Flexion 135 145   Extension +5 +5     Joint Mobility: WFL    Flexibility:    90/90 Hamstrings: R = - ; L = -    Sharon's test: R = - ; L = +   Prone knee bend (RF): R = +; L = -    Special Tests:   Right Left   Valgus Stress Test - -   Varus Stress Test - -   Anterior Drawer - -   Posterior Drawer at 30 deg (PLC) / 90 deg (PCL) - -   Dial Test (PLC) - -   Lachman's Test - -   Posterior Sag Test - -   Viviana's Test - -   Thessaly's Test - -   Patellar Grind Test + -       Biodex Lower Extremity Strength LSI 1/12/24: - Physical performance Test  Knee Extension   Knee Flexion     60 deg/sec 68.8% deficit 60 deg/sec 0.4% deficit   180 deg/sec 58.1% deficit 180 deg/sec 4.6% deficit   300 deg/sec 34.7% deficit 300 deg/sec 10.6% deficit      LSI QUAD ~  35-70%    ____________________________________________________________________________________    COMPARISON OF STRENGTH Biodex Lower Extremity Strength LSI 5/19/2023 - 1/12/2024: - Physical performance Test  Knee Extension   Knee Flexion     60 deg/sec 39.5% deficit 60 deg/sec 4.0% deficit   180 deg/sec 40.3% deficit 180 deg/sec 12.2% deficit   300 deg/sec 20.6% deficit 300 deg/sec 11.2% deficit          Limitation/Restriction for FOTO Knee Survey    Therapist reviewed FOTO scores for Thomas Taveras on 1/12/2024.   FOTO documents entered into [facility] - see Media section.    Limitation Score: see media%         Treatment     Total Treatment time (time-based codes) separate from Evaluation: 30 minutes     Thomas received the treatments listed below:      neuromuscular re-education activities to improve: Balance, Coordination, Kinesthetic, Sense, Proprioception, and Posture for 30 minutes. The following activities were included:    Patient education:  - In depth review of Biodex testing including significance of strength deficits of quad  - how quad strength relates to function and return to higher level activities    NMES Maltese x10 min (Unattended E-stim):  - quad set    M-Trigger Biofeedback:  - SLR 10x10 sec ~1100 microV  - SL shuttle 2B 5x fail  ~200 microV    therapeutic activities to improve functional performance for 00  minutes, including:    NT      Patient Education and Home Exercises     Education provided:   - see above    Written Home Exercises Provided: Patient instructed to cont prior HEP. Exercises were reviewed and Thomas was able to demonstrate them prior to the end of the session.  Thomas demonstrated good  understanding of the education provided. See EMR under Patient Instructions for exercises provided during therapy sessions.    Assessment     Thomas is a 48 y.o. male referred to outpatient Physical Therapy with a medical diagnosis of M12.561 (ICD-10-CM) - Traumatic arthritis of right knee and Z98.890  (ICD-10-CM) - S/P ACL reconstruction. Patient presents with cont pain and swelling, decreased R knee flexion ROM, along significant quad NM activation and strength deficits outlined per Biodex testing most dramatically lacking ~70% strength in his quad at 60 deg/sec and lacking 60% strength at 180 deg/sec. Compared to last Biodex testing in May 2023, he is also weaker today without formal PT intervention. Above impairments contributory to cont functional limitations with ADLs including sitting and stairs, and preventing return to higher level activities e.g. running / jiu jitsu.    Patient prognosis is Good.   Patient will benefit from skilled outpatient Physical Therapy to address the deficits stated above and in the chart below, provide patient /family education, and to maximize patientt's level of independence.     Plan of care discussed with patient: Yes  Patient's spiritual, cultural and educational needs considered and patient is agreeable to the plan of care and goals as stated below:     Anticipated Barriers for therapy: compliance and understanding of condition    Medical Necessity is demonstrated by the following  History  Co-morbidities and personal factors that may impact the plan of care [] LOW: no personal factors / co-morbidities  [x] MODERATE: 1-2 personal factors / co-morbidities  [] HIGH: 3+ personal factors / co-morbidities    Moderate / High Support Documentation:   Co-morbidities affecting plan of care: compliance and understanding of condition    Personal Factors:   no deficits     Examination  Body Structures and Functions, activity limitations and participation restrictions that may impact the plan of care [] LOW: addressing 1-2 elements  [] MODERATE: 3+ elements  [x] HIGH: 4+ elements (please support below)    Moderate / High Support Documentation: pain and swelling, decreased R knee flexion ROM, along significant quad NM activation and strength deficits outlined per Biodex testing most  dramatically lacking ~70% strength in his quad at 60 deg/sec and lacking 60% strength at 180 deg/sec.      Clinical Presentation [x] LOW: stable  [] MODERATE: Evolving  [] HIGH: Unstable     Decision Making/ Complexity Score: low       GOALS: Short Term Goals: 0-8 weeks  1.Report decreased R knee pain  < / =  2/10  to increase tolerance for ADLs  2. Increase knee ROM to full and pain free in order to be able to perform ADLs without difficulty.  3. All Biodex Testing within 80% LSI for quad  4. Pt to tolerate HEP to improve ROM and independence with ADL's    Long Term Goals: 7-16 weeks  1.Report decreased R knee pain < / = 1/10  to increase tolerance for ADLs  2.Patient goal: return to running, jumping, and jiu jitsu   3. All Biodex Testing within 90% LSI for quad  4. Able to pass return to sport testing (VAIL)  4. Pt will report at CJ level (20-40% impaired) on FOTO knee to demonstrate increase in LE function with every day tasks.     Plan     Plan of care Certification: 1/12/2024 to 5/12/2024.    Outpatient Physical Therapy 1 times weekly for 12-16 weeks to include the following interventions: Electrical Stimulation NMES, Manual Therapy, Moist Heat/ Ice, Neuromuscular Re-ed, Patient Education, Self Care, Therapeutic Activities, and Therapeutic Exercise.     YOLETTE MEDINA, PT, DPT, OCS    I certify the need for these services furnished under this plan of treatment and while under my care.        Yolette Marcus MD, FAAOS  , Orthopaedic Sports Medicine  Residency   [facility] Department of Orthopaedic Surgery  Assistant Orthopaedic Surgeon, [place] [facility]  Head Team Physician, [place] [facility]

## 2024-01-19 ENCOUNTER — CLINICAL SUPPORT (OUTPATIENT)
Dept: REHABILITATION | Facility: HOSPITAL | Age: 49
End: 2024-01-19
Payer: COMMERCIAL

## 2024-01-19 DIAGNOSIS — M25.561 RIGHT KNEE PAIN, UNSPECIFIED CHRONICITY: Primary | ICD-10-CM

## 2024-01-19 DIAGNOSIS — M25.661 DECREASED ROM OF RIGHT KNEE: ICD-10-CM

## 2024-01-19 PROCEDURE — 97112 NEUROMUSCULAR REEDUCATION: CPT

## 2024-01-19 PROCEDURE — 97014 ELECTRIC STIMULATION THERAPY: CPT

## 2024-01-19 NOTE — PROGRESS NOTES
[facility]   Physical Therapy Treatment Note      Name: Thomas Taveras  Clinic Number: [id number]    Therapy Diagnosis:   Encounter Diagnoses   Name Primary?    Right knee pain, unspecified chronicity Yes    Decreased ROM of right knee      Physician: Teodoro Marcus MD    Visit Date: 1/19/2024    Physician Orders: PT Eval and Treat   Medical Diagnosis from Referral:      M12.561 (ICD-10-CM) - Traumatic arthritis of right knee   Z98.890 (ICD-10-CM) - S/P ACL reconstruction      Evaluation Date: 1/12/2024  Authorization Period Expiration: 01/01/2025   Plan of Care Expiration: 5/12/2024  Progress Note Due: 3/12/2024  Visit # / Visits authorized: 1/ 1; 1/20  FOTO: 1/3     Precautions: Standard      Time In: 1100  Time Out: 1200  Total Appointment Time (timed & untimed codes): 60 minutes    Subjective     Pt reports: No complaints or changes. Knee was a little swollen after testing last week.    He was compliant with home exercise program.  Response to previous treatment: min complaints  Functional change: ongoing    Pain: 2/10  Location: right knee      Objective      Objective Measures updated at progress report unless specified.     Biodex Lower Extremity Strength LSI 1/12/24: - Physical performance Test  Knee Extension   Knee Flexion     60 deg/sec 68.8% deficit 60 deg/sec 0.4% deficit   180 deg/sec 58.1% deficit 180 deg/sec 4.6% deficit   300 deg/sec 34.7% deficit 300 deg/sec 10.6% deficit      LSI QUAD ~ 35-70%    Treatment     Thomas received the treatments listed below:      neuromuscular re-education activities to improve: Balance, Coordination, Kinesthetic, Sense, Proprioception, and Posture for 60 minutes. The following activities were included:     Patient education:  - cuing for form  - importance of quad NM activation     NMES Maldivian x25 min (Unattended E-stim):  - quad set 17 min  - SLR hold 8 min    M-Trigger Biofeedback:  - SL shuttle 1B1R x10-15 min w/ 10 sec holds ~550  microV  - Eccentric LAQ 10lbs 2x10-12 w/ ~550 microV (green full ROM)    therapeutic activities to improve functional performance for 00  minutes, including:     NT    Patient Education and Home Exercises       Education provided:   - see above    Written Home Exercises Provided: Patient instructed to cont prior HEP. Exercises were reviewed and Thomas was able to demonstrate them prior to the end of the session.  Thomas demonstrated good  understanding of the education provided. See EMR under Patient Instructions for exercises provided during therapy sessions    Assessment     Good jina to all quad NM activation thu with use of NMES and biofeedback. Patient demonstrates improving understanding and control with quad NM activation, but this will require continued focus and practice going forward secondary to prolonged strength deficits.    Cont to progress as above.    Thomas Is progressing well towards his goals.   Pt prognosis is Good.     Pt will continue to benefit from skilled outpatient physical therapy to address the deficits listed in the problem list box on initial evaluation, provide pt/family education and to maximize pt's level of independence in the home and community environment.     Pt's spiritual, cultural and educational needs considered and pt agreeable to plan of care and goals.     Anticipated barriers to physical therapy: compliance and understanding of condition     GOALS: Short Term Goals: 0-8 weeks  1.Report decreased R knee pain  < / =  2/10  to increase tolerance for ADLs  2. Increase knee ROM to full and pain free in order to be able to perform ADLs without difficulty.  3. All Biodex Testing within 80% LSI for quad  4. Pt to tolerate HEP to improve ROM and independence with ADL's     Long Term Goals: 7-16 weeks  1.Report decreased R knee pain < / = 1/10  to increase tolerance for ADLs  2.Patient goal: return to running, jumping, and jiu jitsu   3. All Biodex Testing within 90% LSI for quad  4.  Able to pass return to sport testing (VAIL)  4. Pt will report at CJ level (20-40% impaired) on FOTO knee to demonstrate increase in LE function with every day tasks.     Plan     Plan of care Certification: 1/12/2024 to 5/12/2024.     Outpatient Physical Therapy 1 times weekly for 12-16 weeks to include the following interventions: Electrical Stimulation NMES, Manual Therapy, Moist Heat/ Ice, Neuromuscular Re-ed, Patient Education, Self Care, Therapeutic Activities, and Therapeutic Exercise.    YOLETTE MEDINA, PT, DPT, OCS

## 2024-01-26 ENCOUNTER — CLINICAL SUPPORT (OUTPATIENT)
Dept: REHABILITATION | Facility: HOSPITAL | Age: 49
End: 2024-01-26
Payer: COMMERCIAL

## 2024-01-26 DIAGNOSIS — M25.661 DECREASED ROM OF RIGHT KNEE: ICD-10-CM

## 2024-01-26 DIAGNOSIS — M25.561 RIGHT KNEE PAIN, UNSPECIFIED CHRONICITY: Primary | ICD-10-CM

## 2024-01-26 PROCEDURE — 97530 THERAPEUTIC ACTIVITIES: CPT

## 2024-01-26 PROCEDURE — 97112 NEUROMUSCULAR REEDUCATION: CPT

## 2024-01-26 PROCEDURE — 97014 ELECTRIC STIMULATION THERAPY: CPT

## 2024-01-26 NOTE — PROGRESS NOTES
[facility]   Physical Therapy Treatment Note      Name: Thomas Taveras  Clinic Number: [id number]    Therapy Diagnosis:   Encounter Diagnoses   Name Primary?    Right knee pain, unspecified chronicity Yes    Decreased ROM of right knee        Physician: Teodoro Marcus MD    Visit Date: 1/26/2024    Physician Orders: PT Eval and Treat   Medical Diagnosis from Referral:      M12.561 (ICD-10-CM) - Traumatic arthritis of right knee   Z98.890 (ICD-10-CM) - S/P ACL reconstruction      Evaluation Date: 1/12/2024  Authorization Period Expiration: 01/01/2025   Plan of Care Expiration: 5/12/2024  Progress Note Due: 3/12/2024  Visit # / Visits authorized: 1/ 1; 2/20  FOTO: 1/3     Precautions: Standard      Time In: 1430  Time Out: 1530  Total Appointment Time (timed & untimed codes): 60 minutes    Subjective     Pt reports: Feels some improvement since starting NMES and biofeedback training.    He was compliant with home exercise program.  Response to previous treatment: min complaints  Functional change: ongoing    Pain: 2/10  Location: right knee      Objective      Objective Measures updated at progress report unless specified.     Biodex Lower Extremity Strength LSI 1/12/24: - Physical performance Test  Knee Extension   Knee Flexion     60 deg/sec 68.8% deficit 60 deg/sec 0.4% deficit   180 deg/sec 58.1% deficit 180 deg/sec 4.6% deficit   300 deg/sec 34.7% deficit 300 deg/sec 10.6% deficit      LSI QUAD ~ 35-70%    Treatment     Thomas received the treatments listed below:      neuromuscular re-education activities to improve: Balance, Coordination, Kinesthetic, Sense, Proprioception, and Posture for 50 minutes. The following activities were included:     Patient education:  - cuing for form  - importance of quad NM activation     NMES Niuean x20 min (Unattended E-stim):  - quad set 10 min  - SLR hold w/ 2lbs 10 min    M-Trigger Biofeedback:  - Eccentric LAQ 10lbs x10 w/ ~550 microV  (difficulty today)  - LAQ BTB  x10 w/ ~550 microV (difficulty today)  - SL shuttle 1B1R x10 min w/ 10 sec holds ~350-550 microV      therapeutic activities to improve functional performance for 10 minutes, including:     Bike (RANDOM / alternating resistances) x10 min      Patient Education and Home Exercises       Education provided:   - see above    Written Home Exercises Provided: Patient instructed to cont prior HEP. Exercises were reviewed and Thomas was able to demonstrate them prior to the end of the session.  Thomas demonstrated good  understanding of the education provided. See EMR under Patient Instructions for exercises provided during therapy sessions    Assessment     Good jina to all quad NM activation thu with use of NMES and biofeedback. Had to adjust biofeedback to help patient warm up initially thu for SL shuttle hovers.    Patient demonstrates improving understanding and control with quad NM activation, but this will require continued focus and practice going forward secondary to prolonged strength deficits.    Cont to progress as above.    Thomas Is progressing well towards his goals.   Pt prognosis is Good.     Pt will continue to benefit from skilled outpatient physical therapy to address the deficits listed in the problem list box on initial evaluation, provide pt/family education and to maximize pt's level of independence in the home and community environment.     Pt's spiritual, cultural and educational needs considered and pt agreeable to plan of care and goals.     Anticipated barriers to physical therapy: compliance and understanding of condition     GOALS: Short Term Goals: 0-8 weeks  1.Report decreased R knee pain  < / =  2/10  to increase tolerance for ADLs  2. Increase knee ROM to full and pain free in order to be able to perform ADLs without difficulty.  3. All Biodex Testing within 80% LSI for quad  4. Pt to tolerate HEP to improve ROM and independence with ADL's     Long Term Goals:  7-16 weeks  1.Report decreased R knee pain < / = 1/10  to increase tolerance for ADLs  2.Patient goal: return to running, jumping, and jiu jitsu   3. All Biodex Testing within 90% LSI for quad  4. Able to pass return to sport testing (VAIL)  4. Pt will report at CJ level (20-40% impaired) on FOTO knee to demonstrate increase in LE function with every day tasks.     Plan     Plan of care Certification: 1/12/2024 to 5/12/2024.     Outpatient Physical Therapy 1 times weekly for 12-16 weeks to include the following interventions: Electrical Stimulation NMES, Manual Therapy, Moist Heat/ Ice, Neuromuscular Re-ed, Patient Education, Self Care, Therapeutic Activities, and Therapeutic Exercise.    YOLETTE MEDINA, PT, DPT, OCS

## 2024-02-02 ENCOUNTER — CLINICAL SUPPORT (OUTPATIENT)
Dept: REHABILITATION | Facility: HOSPITAL | Age: 49
End: 2024-02-02
Payer: COMMERCIAL

## 2024-02-02 DIAGNOSIS — M25.661 DECREASED ROM OF RIGHT KNEE: ICD-10-CM

## 2024-02-02 DIAGNOSIS — M25.561 RIGHT KNEE PAIN, UNSPECIFIED CHRONICITY: Primary | ICD-10-CM

## 2024-02-02 PROCEDURE — 97112 NEUROMUSCULAR REEDUCATION: CPT

## 2024-02-02 PROCEDURE — 97530 THERAPEUTIC ACTIVITIES: CPT

## 2024-02-02 NOTE — PROGRESS NOTES
[facility]   Physical Therapy Treatment Note      Name: Thomas Taveras  Clinic Number: [id number]    Therapy Diagnosis:   Encounter Diagnoses   Name Primary?    Right knee pain, unspecified chronicity Yes    Decreased ROM of right knee      Physician: Teodoro Marcus MD    Visit Date: 2/2/2024    Physician Orders: PT Eval and Treat   Medical Diagnosis from Referral:      M12.561 (ICD-10-CM) - Traumatic arthritis of right knee   Z98.890 (ICD-10-CM) - S/P ACL reconstruction      Evaluation Date: 1/12/2024  Authorization Period Expiration: 01/01/2025   Plan of Care Expiration: 5/12/2024  Progress Note Due: 3/12/2024  Visit # / Visits authorized: 1/ 1; 3/20   FOTO: 1/3     Precautions: Standard      Time In: 1420  Time Out:  1530  Total Appointment Time (timed & untimed codes): 70 minutes    Subjective     Pt reports: No complaints currently. Did a little bit of jogging with  earlier today with slight knee discomfort.    He was compliant with home exercise program.  Response to previous treatment: min complaints  Functional change: ongoing    Pain: 2/10  Location: right knee      Objective      Objective Measures updated at progress report unless specified.     Biodex Lower Extremity Strength LSI 1/12/24: - Physical performance Test  Knee Extension   Knee Flexion     60 deg/sec 68.8% deficit 60 deg/sec 0.4% deficit   180 deg/sec 58.1% deficit 180 deg/sec 4.6% deficit   300 deg/sec 34.7% deficit 300 deg/sec 10.6% deficit      LSI QUAD ~ 35-70%    Treatment     Thomas received the treatments listed below:      neuromuscular re-education activities to improve: Balance, Coordination, Kinesthetic, Sense, Proprioception, and Posture for 55 minutes. The following activities were included:     Patient education:  - cuing for form  - importance of quad NM activation    M-Trigger Biofeedback:  - SL shuttle 2B x6 min w/ 10 sec hold ~500 microV  - DL shuttle 3B x6 min w/ 10 sec hold ~500  microV  - Sidelying SL shuttle 1B1R x6 min w/ 10 sec holds ~500 microV    - Eccentric LAQ 10lbs 2x10 w/ ~500-800 microV  - Knee ext iso at 60-70 deg x6 min  w/ 10 sec holds ~800 microV    NEXT VISIT - cont as above and below    Held:   NMES Ivorian x20 min (Unattended E-stim):  - quad set 10 min  - SLR hold w/ 2lbs 10 min    therapeutic activities to improve functional performance for 15 minutes, including:     Bike (RANDOM / alternating resistances) x15 min      Patient Education and Home Exercises       Education provided:   - see above    Written Home Exercises Provided: Patient instructed to cont prior HEP. Exercises were reviewed and Thomas was able to demonstrate them prior to the end of the session.  Thomas demonstrated good  understanding of the education provided. See EMR under Patient Instructions for exercises provided during therapy sessions    Assessment     Excellent quad NM activation and recruitment with biofeedback training today.    Patient demonstrates improving understanding and control with quad NM activation, but this will require continued focus and practice going forward secondary to prolonged strength deficits.    Cont to progress as above.    Thomas Is progressing well towards his goals.   Pt prognosis is Good.     Pt will continue to benefit from skilled outpatient physical therapy to address the deficits listed in the problem list box on initial evaluation, provide pt/family education and to maximize pt's level of independence in the home and community environment.     Pt's spiritual, cultural and educational needs considered and pt agreeable to plan of care and goals.     Anticipated barriers to physical therapy: compliance and understanding of condition     GOALS: Short Term Goals: 0-8 weeks  1.Report decreased R knee pain  < / =  2/10  to increase tolerance for ADLs  2. Increase knee ROM to full and pain free in order to be able to perform ADLs without difficulty.  3. All Biodex Testing  within 80% LSI for quad  4. Pt to tolerate HEP to improve ROM and independence with ADL's     Long Term Goals: 7-16 weeks  1.Report decreased R knee pain < / = 1/10  to increase tolerance for ADLs  2.Patient goal: return to running, jumping, and jiu jitsu   3. All Biodex Testing within 90% LSI for quad  4. Able to pass return to sport testing (VAIL)  4. Pt will report at CJ level (20-40% impaired) on FOTO knee to demonstrate increase in LE function with every day tasks.     Plan     Plan of care Certification: 1/12/2024 to 5/12/2024.     Outpatient Physical Therapy 1 times weekly for 12-16 weeks to include the following interventions: Electrical Stimulation NMES, Manual Therapy, Moist Heat/ Ice, Neuromuscular Re-ed, Patient Education, Self Care, Therapeutic Activities, and Therapeutic Exercise.    YOLETTE MEDINA, PT, DPT, OCS

## 2024-02-09 ENCOUNTER — CLINICAL SUPPORT (OUTPATIENT)
Dept: REHABILITATION | Facility: HOSPITAL | Age: 49
End: 2024-02-09
Payer: COMMERCIAL

## 2024-02-09 DIAGNOSIS — M25.661 DECREASED ROM OF RIGHT KNEE: ICD-10-CM

## 2024-02-09 DIAGNOSIS — M25.561 ACUTE PAIN OF RIGHT KNEE: Primary | ICD-10-CM

## 2024-02-09 PROCEDURE — 97530 THERAPEUTIC ACTIVITIES: CPT | Mod: CQ

## 2024-02-09 PROCEDURE — 97112 NEUROMUSCULAR REEDUCATION: CPT | Mod: CQ

## 2024-02-09 NOTE — PROGRESS NOTES
"[facility]   Physical Therapy Treatment Note      Name: Thomas Taveras  Clinic Number: [id number]    Therapy Diagnosis:   Encounter Diagnoses   Name Primary?    Acute pain of right knee Yes    Decreased ROM of right knee      Physician: Teodoro Marcus MD    Visit Date: 2/9/2024    Physician Orders: PT Eval and Treat   Medical Diagnosis from Referral:      M12.561 (ICD-10-CM) - Traumatic arthritis of right knee   Z98.890 (ICD-10-CM) - S/P ACL reconstruction      Evaluation Date: 1/12/2024  Authorization Period Expiration: 01/01/2025   Plan of Care Expiration: 5/12/2024  Progress Note Due: 3/12/2024  Visit # / Visits authorized: 1/ 1; 3/20   FOTO: 1/3     Precautions: Standard      Time In: 1050  Time Out:  1150  Total Appointment Time (timed & untimed codes): 79539 minutes    Subjective     Pt reports: no pain but still with has pain after prolonged sitting   He was compliant with home exercise program and Gym 3x/week   Response to previous treatment: no issues   Functional change: min lack full quad strengthe\    Pain: 2/10  Location: right knee      Objective      Objective Measures updated at progress report unless specified.     Biodex Lower Extremity Strength LSI 1/12/24: - Physical performance Test  Knee Extension   Knee Flexion     60 deg/sec 68.8% deficit 60 deg/sec 0.4% deficit   180 deg/sec 58.1% deficit 180 deg/sec 4.6% deficit   300 deg/sec 34.7% deficit 300 deg/sec 10.6% deficit      LSI QUAD ~ 35-70%    Treatment     Thomas received the treatments listed below:      neuromuscular re-education activities to improve: Balance, Coordination, Kinesthetic, Sense, Proprioception, and Posture for 55 minutes. The following activities were included:  BTB B Side plank clamshells 3x10/5"      Patient education:  - cuing for form  - importance of quad NM activation    M-Trigger Biofeedback:  - SL shuttle 2B x6 min w/ 10 sec hold ~500 microV  - DL shuttle 3B x6 min w/ 10 sec hold ~500 " microV  - Sidelying SL shuttle 1B1R x6 min w/ 10 sec holds ~500 microV  - Eccentric LAQ 10lbs 2x10 w/ ~500-800 microV    - Knee ext iso at 60-70 deg x6 min  w/ 10 sec holds ~800 microV    NEXT VISIT - cont as above and below    Held:   NMES Welsh x20 min (Unattended E-stim):  - quad set 10 min  - SLR hold w/ 2lbs 10 min    therapeutic activities to improve functional performance for 15 minutes, including:    Bike (RANDOM / alternating resistances) x15 min    Patient Education and Home Exercises       Education provided:   - see above    Written Home Exercises Provided: Patient instructed to cont prior HEP. Exercises were reviewed and Thomas was able to demonstrate them prior to the end of the session.  Thomas demonstrated good  understanding of the education provided. See EMR under Patient Instructions for exercises provided during therapy sessions    Assessment   Pt tolerating tx well continued with M trigger biofeedback per Supervising PT. Good  quad NM activation and recruitment noted. VC/TC for correcting form/technique with biofeedback training today.    Patient demonstrates improving understanding and control with quad NM activation, but this will require continued focus and practice going forward secondary to prolonged strength deficits.    Cont to progress as above.    Thomas Is progressing well towards his goals.   Pt prognosis is Good.     Pt will continue to benefit from skilled outpatient physical therapy to address the deficits listed in the problem list box on initial evaluation, provide pt/family education and to maximize pt's level of independence in the home and community environment.     Pt's spiritual, cultural and educational needs considered and pt agreeable to plan of care and goals.     Anticipated barriers to physical therapy: compliance and understanding of condition     GOALS: Short Term Goals: 0-8 weeks  1.Report decreased R knee pain  < / =  2/10  to increase tolerance for ADLs  2.  Increase knee ROM to full and pain free in order to be able to perform ADLs without difficulty.  3. All Biodex Testing within 80% LSI for quad  4. Pt to tolerate HEP to improve ROM and independence with ADL's     Long Term Goals: 7-16 weeks  1.Report decreased R knee pain < / = 1/10  to increase tolerance for ADLs  2.Patient goal: return to running, jumping, and jiu jitsu   3. All Biodex Testing within 90% LSI for quad  4. Able to pass return to sport testing (VAIL)  4. Pt will report at CJ level (20-40% impaired) on FOTO knee to demonstrate increase in LE function with every day tasks.     Plan     Plan of care Certification: 1/12/2024 to 5/12/2024.     Outpatient Physical Therapy 1 times weekly for 12-16 weeks to include the following interventions: Electrical Stimulation NMES, Manual Therapy, Moist Heat/ Ice, Neuromuscular Re-ed, Patient Education, Self Care, Therapeutic Activities, and Therapeutic Exercise.    Dominick Hurley, PTA, STS

## 2024-02-16 ENCOUNTER — CLINICAL SUPPORT (OUTPATIENT)
Dept: REHABILITATION | Facility: HOSPITAL | Age: 49
End: 2024-02-16
Payer: COMMERCIAL

## 2024-02-16 DIAGNOSIS — M25.561 RIGHT KNEE PAIN, UNSPECIFIED CHRONICITY: Primary | ICD-10-CM

## 2024-02-16 DIAGNOSIS — M25.661 DECREASED ROM OF RIGHT KNEE: ICD-10-CM

## 2024-02-16 PROCEDURE — 97530 THERAPEUTIC ACTIVITIES: CPT

## 2024-02-16 PROCEDURE — 97112 NEUROMUSCULAR REEDUCATION: CPT

## 2024-02-16 NOTE — PROGRESS NOTES
[facility]   Physical Therapy Treatment Note      Name: Thomas Taveras  Clinic Number: [id number]    Therapy Diagnosis:   Encounter Diagnoses   Name Primary?    Right knee pain, unspecified chronicity Yes    Decreased ROM of right knee        Physician: Teodoro Marcus MD    Visit Date: 2/16/2024    Physician Orders: PT Eval and Treat   Medical Diagnosis from Referral:      M12.561 (ICD-10-CM) - Traumatic arthritis of right knee   Z98.890 (ICD-10-CM) - S/P ACL reconstruction      Evaluation Date: 1/12/2024  Authorization Period Expiration: 01/01/2025   Plan of Care Expiration: 5/12/2024  Progress Note Due: 3/12/2024  Visit # / Visits authorized: 1/ 1; 5/20   FOTO: 1/3     Precautions: Standard      Time In: 1430  Time Out: 1530  Total Appointment Time (timed & untimed codes): 60 minutes    Subjective     Pt reports: Feeling stronger over time. Cont good and bad days.    He was compliant with home exercise program.  Response to previous treatment: min complaints  Functional change: ongoing    Pain: 2/10  Location: right knee      Objective      Objective Measures updated at progress report unless specified.     Biodex Lower Extremity Strength LSI 1/12/24: - Physical performance Test  Knee Extension   Knee Flexion     60 deg/sec 68.8% deficit 60 deg/sec 0.4% deficit   180 deg/sec 58.1% deficit 180 deg/sec 4.6% deficit   300 deg/sec 34.7% deficit 300 deg/sec 10.6% deficit      LSI QUAD ~ 35-70%    Treatment     Thomas received the treatments listed below:      neuromuscular re-education activities to improve: Balance, Coordination, Kinesthetic, Sense, Proprioception, and Posture for 50 minutes. The following activities were included:     Patient education:  - cuing for form  - importance of quad NM activation    M-Trigger Biofeedback:  - SL shuttle 2B x6 min w/ 10 sec hold ~500 microV  - SL Shuttle eccentric 2B x15 reps ~500 microV  - DL shuttle 3B1R eccentric 2B x15 reps ~500 microV - NT  -  DL shuttle 3B1R eccentric 2B x15 reps ~500 microV  - Sidelying SL shuttle 1B1R x6 min w/ 10 sec holds ~500 microV - NT  - Eccentric LAQ 10lbs x15 w/ ~500-800 microV  - Knee extension matrix 25# DL 4x10  - Knee ext iso at 60-70 deg x6 min  w/ 10 sec holds ~800 microV - NT    NEXT VISIT - cont as above and below  BIODEX TESTING - 3/1/24    Held:   NMES Maldivian x20 min (Unattended E-stim):  - quad set 10 min  - SLR hold w/ 2lbs 10 min    therapeutic activities to improve functional performance for 10 minutes, including:     Bike (RANDOM / alternating resistances) x15 min      Patient Education and Home Exercises       Education provided:   - see above    Written Home Exercises Provided: Patient instructed to cont prior HEP. Exercises were reviewed and Thomas was able to demonstrate them prior to the end of the session.  Thomas demonstrated good  understanding of the education provided. See EMR under Patient Instructions for exercises provided during therapy sessions    Assessment     Cont excellent and improving quad NM activation and recruitment with biofeedback training today.    Patient demonstrates improving understanding and control with quad NM activation, but this will require continued focus and practice going forward secondary to prolonged strength deficits.    Cont to progress as above; plan on Biodex retesting on March 1st ~8 weeks since start of PT.    Thomas Is progressing well towards his goals.   Pt prognosis is Good.     Pt will continue to benefit from skilled outpatient physical therapy to address the deficits listed in the problem list box on initial evaluation, provide pt/family education and to maximize pt's level of independence in the home and community environment.     Pt's spiritual, cultural and educational needs considered and pt agreeable to plan of care and goals.     Anticipated barriers to physical therapy: compliance and understanding of condition     GOALS: Short Term Goals: 0-8  weeks  1.Report decreased R knee pain  < / =  2/10  to increase tolerance for ADLs  2. Increase knee ROM to full and pain free in order to be able to perform ADLs without difficulty.  3. All Biodex Testing within 80% LSI for quad  4. Pt to tolerate HEP to improve ROM and independence with ADL's     Long Term Goals: 7-16 weeks  1.Report decreased R knee pain < / = 1/10  to increase tolerance for ADLs  2.Patient goal: return to running, jumping, and jiu jitsu   3. All Biodex Testing within 90% LSI for quad  4. Able to pass return to sport testing (VAIL)  4. Pt will report at CJ level (20-40% impaired) on FOTO knee to demonstrate increase in LE function with every day tasks.     Plan     Plan of care Certification: 1/12/2024 to 5/12/2024.     Outpatient Physical Therapy 1 times weekly for 12-16 weeks to include the following interventions: Electrical Stimulation NMES, Manual Therapy, Moist Heat/ Ice, Neuromuscular Re-ed, Patient Education, Self Care, Therapeutic Activities, and Therapeutic Exercise.    YOLETTE MEDINA, PT, DPT, OCS

## 2024-02-23 ENCOUNTER — CLINICAL SUPPORT (OUTPATIENT)
Dept: REHABILITATION | Facility: HOSPITAL | Age: 49
End: 2024-02-23
Payer: COMMERCIAL

## 2024-02-23 DIAGNOSIS — M25.661 DECREASED ROM OF RIGHT KNEE: ICD-10-CM

## 2024-02-23 DIAGNOSIS — M25.561 RIGHT KNEE PAIN, UNSPECIFIED CHRONICITY: Primary | ICD-10-CM

## 2024-02-23 PROCEDURE — 97530 THERAPEUTIC ACTIVITIES: CPT

## 2024-02-23 PROCEDURE — 97112 NEUROMUSCULAR REEDUCATION: CPT

## 2024-02-23 NOTE — PROGRESS NOTES
[facility]   Physical Therapy Treatment Note      Name: Thomas Taveras  Clinic Number: [id number]    Therapy Diagnosis:   Encounter Diagnoses   Name Primary?    Right knee pain, unspecified chronicity Yes    Decreased ROM of right knee      Physician: Teodoro Marcus MD    Visit Date: 2/23/2024    Physician Orders: PT Eval and Treat   Medical Diagnosis from Referral:      M12.561 (ICD-10-CM) - Traumatic arthritis of right knee   Z98.890 (ICD-10-CM) - S/P ACL reconstruction      Evaluation Date: 1/12/2024  Authorization Period Expiration: 01/01/2025   Plan of Care Expiration: 5/12/2024  Progress Note Due: 3/12/2024  Visit # / Visits authorized: 1/ 1; 6/20   FOTO: 1/3     Precautions: Standard      Time In: 1430   Time Out: 1530  Total Appointment Time (timed & untimed codes): 60 minutes    Subjective     Pt reports: Reduced stiffness on 5 hour car rides. Reduced limping and swelling as well.    FOTO IE - 66%  FOTO 2/23/24 - 74%  FOTO Goal - 77%    He was compliant with home exercise program.  Response to previous treatment: min complaints  Functional change: ongoing    Pain: 2/10  Location: right knee      Objective      Objective Measures updated at progress report unless specified.     Biodex Lower Extremity Strength LSI 1/12/24: - Physical performance Test  Knee Extension   Knee Flexion     60 deg/sec 68.8% deficit 60 deg/sec 0.4% deficit   180 deg/sec 58.1% deficit 180 deg/sec 4.6% deficit   300 deg/sec 34.7% deficit 300 deg/sec 10.6% deficit      LSI QUAD ~ 35-70%    Treatment     Thomas received the treatments listed below:      neuromuscular re-education activities to improve: Balance, Coordination, Kinesthetic, Sense, Proprioception, and Posture for 50 minutes. The following activities were included:     Patient education:  - cuing for form  - importance of quad NM activation    M-Trigger Biofeedback:  - SL shuttle 2B1R x6 min w/ 10 sec hold ~700 microV  - SL Shuttle eccentric 2B1R  x15 reps ~700 microV  - Sidelying SL shuttle 2B1R x6 min w/ 10 sec holds ~700 microV  - Sidelying SL shuttle eccentric 1B1R x15 reps ~700 microV  - Knee extension matrix eccentric 25# DL 3x10    Held:  - DL shuttle 3B1R eccentric 2B x15 reps ~500 microV  - DL shuttle 3B1R eccentric 2B x15 reps ~500 microV  - Eccentric LAQ 10lbs x15 w/ ~500-800 microV  - Knee ext iso at 60-70 deg x6 min  w/ 10 sec holds ~800 microV    NEXT VISIT - cont as above and below  BIODEX TESTING - 3/1/24    Held:   NMES Cameroonian x20 min (Unattended E-stim):  - quad set 10 min  - SLR hold w/ 2lbs 10 min    therapeutic activities to improve functional performance for 10 minutes, including:     Bike (RANDOM / alternating resistances) x10 min      Patient Education and Home Exercises       Education provided:   - see above    Written Home Exercises Provided: Patient instructed to cont prior HEP. Exercises were reviewed and Thomas was able to demonstrate them prior to the end of the session.  Thomas demonstrated good  understanding of the education provided. See EMR under Patient Instructions for exercises provided during therapy sessions    Assessment     Improving function per FOTO scores. Improving quad NM activation contributing to improving function. Noted ability to increase NM activation of quad with need to adjust challenge of settings on biofeedback to 700 microV from 500 microV.    Will require continued focus and practice for quad NM activation and strength going forward secondary to prolonged deficits.    Biodex testing planned for next week to reassess LSI.    Thomas Is progressing well towards his goals.   Pt prognosis is Good.     Pt will continue to benefit from skilled outpatient physical therapy to address the deficits listed in the problem list box on initial evaluation, provide pt/family education and to maximize pt's level of independence in the home and community environment.     Pt's spiritual, cultural and educational needs  considered and pt agreeable to plan of care and goals.     Anticipated barriers to physical therapy: compliance and understanding of condition     GOALS: Short Term Goals: 0-8 weeks  1.Report decreased R knee pain  < / =  2/10  to increase tolerance for ADLs  2. Increase knee ROM to full and pain free in order to be able to perform ADLs without difficulty.  3. All Biodex Testing within 80% LSI for quad  4. Pt to tolerate HEP to improve ROM and independence with ADL's     Long Term Goals: 7-16 weeks  1.Report decreased R knee pain < / = 1/10  to increase tolerance for ADLs  2.Patient goal: return to running, jumping, and jiu jitsu   3. All Biodex Testing within 90% LSI for quad  4. Able to pass return to sport testing (VAIL)  4. Pt will report at CJ level (20-40% impaired) on FOTO knee to demonstrate increase in LE function with every day tasks.     Plan     Plan of care Certification: 1/12/2024 to 5/12/2024.     Outpatient Physical Therapy 1 times weekly for 12-16 weeks to include the following interventions: Electrical Stimulation NMES, Manual Therapy, Moist Heat/ Ice, Neuromuscular Re-ed, Patient Education, Self Care, Therapeutic Activities, and Therapeutic Exercise.    YOLETTE MEDINA, PT, DPT, OCS

## 2024-03-01 ENCOUNTER — CLINICAL SUPPORT (OUTPATIENT)
Dept: REHABILITATION | Facility: HOSPITAL | Age: 49
End: 2024-03-01
Payer: COMMERCIAL

## 2024-03-01 DIAGNOSIS — M25.561 RIGHT KNEE PAIN, UNSPECIFIED CHRONICITY: Primary | ICD-10-CM

## 2024-03-01 DIAGNOSIS — M25.661 DECREASED ROM OF RIGHT KNEE: ICD-10-CM

## 2024-03-01 PROCEDURE — 97530 THERAPEUTIC ACTIVITIES: CPT

## 2024-03-01 PROCEDURE — 97112 NEUROMUSCULAR REEDUCATION: CPT

## 2024-03-01 PROCEDURE — 97750 PHYSICAL PERFORMANCE TEST: CPT

## 2024-03-01 NOTE — PROGRESS NOTES
[facility]   Physical Therapy Treatment Note      Name: Thomas Taveras  Clinic Number: [id number]    Therapy Diagnosis:   Encounter Diagnoses   Name Primary?    Right knee pain, unspecified chronicity Yes    Decreased ROM of right knee        Physician: Teodoro Marcus MD    Visit Date: 3/1/2024    Physician Orders: PT Eval and Treat   Medical Diagnosis from Referral:      M12.561 (ICD-10-CM) - Traumatic arthritis of right knee   Z98.890 (ICD-10-CM) - S/P ACL reconstruction      Evaluation Date: 1/12/2024  Authorization Period Expiration: 01/01/2025   Plan of Care Expiration: 5/12/2024  Progress Note Due: 3/12/2024  Visit # / Visits authorized: 1/ 1; 7/20   FOTO: 1/3     Precautions: Standard      Time In: 1430   Time Out: 1540  Total Appointment Time (timed & untimed codes): 70 minutes    Subjective     Pt reports: Ready for Biodex testing. No new complaints.    FOTO IE - 66%  FOTO 2/23/24 - 74%  FOTO Goal - 77%    He was compliant with home exercise program.  Response to previous treatment: min complaints  Functional change: ongoing    Pain: 2/10  Location: right knee      Objective      Objective Measures updated at progress report unless specified.     Biodex Lower Extremity Strength LSI 1/12/24: - Physical performance Test  Knee Extension   Knee Flexion     60 deg/sec 68.8% deficit 60 deg/sec 0.4% deficit   180 deg/sec 58.1% deficit 180 deg/sec 4.6% deficit   300 deg/sec 34.7% deficit 300 deg/sec 10.6% deficit      Biodex Lower Extremity Strength LSI 3/1/24: - Physical performance Test  Knee Extension   Knee Flexion     60 deg/sec 67.8% deficit 60 deg/sec 7.8% stronger   180 deg/sec 46.4% deficit 180 deg/sec 4.5% deficit   300 deg/sec 35.9% deficit 300 deg/sec 10.4% stronger     LSI 35-65%    Biodex Lower Extremity Strength LSI 1/12/24 - 3/1/24: - Physical performance Test  Knee Extension   Knee Flexion     60 deg/sec 14.6% stronger 60 deg/sec 9.0% stronger   180 deg/sec 27.2%  stronger 180 deg/sec 4.6% stronger   300 deg/sec 5.4% stronger 300 deg/sec 5.8% deficit       Treatment     Thomsa received the treatments listed below:      neuromuscular re-education activities to improve: Balance, Coordination, Kinesthetic, Sense, Proprioception, and Posture for 23 minutes. The following activities were included:     Patient education:  - cuing for form  - importance of quad NM activation    BFR 80% 30:15:15:15:  - LAQ 7.5 lbs  - SL shuttle 2B (last two sets had to switch to DL) - quad focus      NEXT VISIT - BFR / Biofeedback      Held:  M-Trigger Biofeedback:  - SL shuttle 2B1R x6 min w/ 10 sec hold ~700 microV  - SL Shuttle eccentric 2B1R x15 reps ~700 microV  - Sidelying SL shuttle 2B1R x6 min w/ 10 sec holds ~700 microV  - Sidelying SL shuttle eccentric 1B1R x15 reps ~700 microV  - Knee extension matrix eccentric 25# DL 3x10  - DL shuttle 3B1R eccentric 2B x15 reps ~500 microV  - DL shuttle 3B1R eccentric 2B x15 reps ~500 microV  - Eccentric LAQ 10lbs x15 w/ ~500-800 microV  - Knee ext iso at 60-70 deg x6 min  w/ 10 sec holds ~800 microV        therapeutic activities to improve functional performance for 25 minutes, including:     Patient education:  - review of biodex testing results as it relates to functional progress over time  - PROGRESSION  - working out quad M and W with ; F with PT    Bike (RANDOM / alternating resistances) x15 min      Patient Education and Home Exercises       Education provided:   - see above    Written Home Exercises Provided: Patient instructed to cont prior HEP. Exercises were reviewed and Thomas was able to demonstrate them prior to the end of the session.  Thomas demonstrated good  understanding of the education provided. See EMR under Patient Instructions for exercises provided during therapy sessions    Assessment     Patient is 7 weeks since starting current POC.     Function has improved well thu with FOTO scores secondary to improving quad  NM activation and proprioception. Patient better able to find quad during exercise.    On Biodex testing, LSI measurements are ~ the same as when last assessed, but good improvement in quad and HS strength compared to last assessment (see above). Patient to increase focus on quad strength with  2x a week with x1 visit with PT to focus on quad NM activation and isolation going forward.    Will require continued focus and practice for quad NM activation and strength going forward secondary to prolonged deficits.    Thomas Is progressing well towards his goals.   Pt prognosis is Good.     Pt will continue to benefit from skilled outpatient physical therapy to address the deficits listed in the problem list box on initial evaluation, provide pt/family education and to maximize pt's level of independence in the home and community environment.     Pt's spiritual, cultural and educational needs considered and pt agreeable to plan of care and goals.     Anticipated barriers to physical therapy: compliance and understanding of condition     GOALS: Short Term Goals: 0-8 weeks  1.Report decreased R knee pain  < / =  2/10  to increase tolerance for ADLs  2. Increase knee ROM to full and pain free in order to be able to perform ADLs without difficulty.  3. All Biodex Testing within 80% LSI for quad  4. Pt to tolerate HEP to improve ROM and independence with ADL's     Long Term Goals: 7-16 weeks  1.Report decreased R knee pain < / = 1/10  to increase tolerance for ADLs  2.Patient goal: return to running, jumping, and jiu jitsu   3. All Biodex Testing within 90% LSI for quad  4. Able to pass return to sport testing (VAIL)  4. Pt will report at CJ level (20-40% impaired) on FOTO knee to demonstrate increase in LE function with every day tasks.     Plan     Plan of care Certification: 1/12/2024 to 5/12/2024.     Outpatient Physical Therapy 1 times weekly for 12-16 weeks to include the following interventions:  Electrical Stimulation NMES, Manual Therapy, Moist Heat/ Ice, Neuromuscular Re-ed, Patient Education, Self Care, Therapeutic Activities, and Therapeutic Exercise.    YOLETTE MEDINA, PT, DPT, OCS

## 2024-03-08 ENCOUNTER — CLINICAL SUPPORT (OUTPATIENT)
Dept: REHABILITATION | Facility: HOSPITAL | Age: 49
End: 2024-03-08
Payer: COMMERCIAL

## 2024-03-08 DIAGNOSIS — M25.661 DECREASED ROM OF RIGHT KNEE: ICD-10-CM

## 2024-03-08 DIAGNOSIS — M25.561 RIGHT KNEE PAIN, UNSPECIFIED CHRONICITY: Primary | ICD-10-CM

## 2024-03-08 PROCEDURE — 97112 NEUROMUSCULAR REEDUCATION: CPT

## 2024-03-08 PROCEDURE — 97530 THERAPEUTIC ACTIVITIES: CPT

## 2024-03-08 NOTE — PROGRESS NOTES
[facility]   Physical Therapy Treatment Note      Name: Thomas Taveras  Clinic Number: [id number]    Therapy Diagnosis:   Encounter Diagnoses   Name Primary?    Right knee pain, unspecified chronicity Yes    Decreased ROM of right knee      Physician: Teodoro Marcus MD    Visit Date: 3/8/2024    Physician Orders: PT Eval and Treat   Medical Diagnosis from Referral:      M12.561 (ICD-10-CM) - Traumatic arthritis of right knee   Z98.890 (ICD-10-CM) - S/P ACL reconstruction      Evaluation Date: 1/12/2024  Authorization Period Expiration: 01/01/2025   Plan of Care Expiration: 5/12/2024  Progress Note Due: 3/12/2024  Visit # / Visits authorized: 1/ 1; 8/20   FOTO: 1/3     Precautions: Standard      Time In: 1430    Time Out: 1535  Total Appointment Time (timed & untimed codes): 65 minutes    Subjective     Pt reports: No new complaints. Has been working out legs with  this week x2.    FOTO IE - 66%  FOTO 2/23/24 - 74%  FOTO Goal - 77%    He was compliant with home exercise program.  Response to previous treatment: min complaints  Functional change: ongoing    Pain: 2/10  Location: right knee      Objective      Objective Measures updated at progress report unless specified.     Biodex Lower Extremity Strength LSI 1/12/24: - Physical performance Test  Knee Extension   Knee Flexion     60 deg/sec 68.8% deficit 60 deg/sec 0.4% deficit   180 deg/sec 58.1% deficit 180 deg/sec 4.6% deficit   300 deg/sec 34.7% deficit 300 deg/sec 10.6% deficit      Biodex Lower Extremity Strength LSI 3/1/24: - Physical performance Test  Knee Extension   Knee Flexion     60 deg/sec 67.8% deficit 60 deg/sec 7.8% stronger   180 deg/sec 46.4% deficit 180 deg/sec 4.5% deficit   300 deg/sec 35.9% deficit 300 deg/sec 10.4% stronger     LSI 35-65%    Biodex Lower Extremity Strength LSI 1/12/24 - 3/1/24: - Physical performance Test  Knee Extension   Knee Flexion     60 deg/sec 14.6% stronger 60 deg/sec 9.0%  stronger   180 deg/sec 27.2% stronger 180 deg/sec 4.6% stronger   300 deg/sec 5.4% stronger 300 deg/sec 5.8% deficit       Treatment     Thomas received the treatments listed below:      neuromuscular re-education activities to improve: Balance, Coordination, Kinesthetic, Sense, Proprioception, and Posture for 40 minutes. The following activities were included:     Patient education:  - cuing for form  - importance of quad NM activation    M-Trigger Biofeedback:  - SL shuttle 3B x8 min w/ 10 sec hold ~700 microV  - SL Shuttle eccentric 3V x15 reps ~700 microV      BFR 80% 30:15:15:15:  - LAQ 7.5 lbs  - SAQ 5 lbs  - SL shuttle 2B (last two sets had to switch to DL) - quad focus  - NT      NEXT VISIT - BFR / Biofeedback      Held:  - Sidelying SL shuttle 2B1R x6 min w/ 10 sec holds ~700 microV  - Sidelying SL shuttle eccentric 1B1R x15 reps ~700 microV  - Knee extension matrix eccentric 25# DL 3x10  - DL shuttle 3B1R eccentric 2B x15 reps ~500 microV  - DL shuttle 3B1R eccentric 2B x15 reps ~500 microV  - Eccentric LAQ 10lbs x15 w/ ~500-800 microV  - Knee ext iso at 60-70 deg x6 min  w/ 10 sec holds ~800 microV    therapeutic activities to improve functional performance for 25 minutes, including:     Patient education:  - Cont working out quad M and W with ; F with PT    Bike (RANDOM / alternating resistances) x10 min    Sled push 90# x5 laps  Sled pull 45# x5 laps      Patient Education and Home Exercises       Education provided:   - see above    Written Home Exercises Provided: Patient instructed to cont prior HEP. Exercises were reviewed and Thomas was able to demonstrate them prior to the end of the session.  Thomas demonstrated good  understanding of the education provided. See EMR under Patient Instructions for exercises provided during therapy sessions    Assessment     Good jina to all interventions focused on quad NM activation and strength. Difficulty persists with loading w/o biofeedback in CKC  e.g. sled push / pull, but improved OKC isolation w/o need for cuing e.g. BFR.    Will require continued focus and practice for quad NM activation and strength going forward secondary to prolonged deficits and LSI differences.    Thomas Is progressing well towards his goals.   Pt prognosis is Good.     Pt will continue to benefit from skilled outpatient physical therapy to address the deficits listed in the problem list box on initial evaluation, provide pt/family education and to maximize pt's level of independence in the home and community environment.     Pt's spiritual, cultural and educational needs considered and pt agreeable to plan of care and goals.     Anticipated barriers to physical therapy: compliance and understanding of condition     GOALS: Short Term Goals: 0-8 weeks  1.Report decreased R knee pain  < / =  2/10  to increase tolerance for ADLs  2. Increase knee ROM to full and pain free in order to be able to perform ADLs without difficulty.  3. All Biodex Testing within 80% LSI for quad  4. Pt to tolerate HEP to improve ROM and independence with ADL's     Long Term Goals: 7-16 weeks  1.Report decreased R knee pain < / = 1/10  to increase tolerance for ADLs  2.Patient goal: return to running, jumping, and jiu jitsu   3. All Biodex Testing within 90% LSI for quad  4. Able to pass return to sport testing (VAIL)  4. Pt will report at CJ level (20-40% impaired) on FOTO knee to demonstrate increase in LE function with every day tasks.     Plan     Plan of care Certification: 1/12/2024 to 5/12/2024.     Outpatient Physical Therapy 1 times weekly for 12-16 weeks to include the following interventions: Electrical Stimulation NMES, Manual Therapy, Moist Heat/ Ice, Neuromuscular Re-ed, Patient Education, Self Care, Therapeutic Activities, and Therapeutic Exercise.    YOLETTE MEDINA, PT, DPT, OCS

## 2024-03-15 ENCOUNTER — CLINICAL SUPPORT (OUTPATIENT)
Dept: REHABILITATION | Facility: HOSPITAL | Age: 49
End: 2024-03-15
Payer: COMMERCIAL

## 2024-03-15 DIAGNOSIS — M25.661 DECREASED ROM OF RIGHT KNEE: ICD-10-CM

## 2024-03-15 DIAGNOSIS — M25.561 RIGHT KNEE PAIN, UNSPECIFIED CHRONICITY: Primary | ICD-10-CM

## 2024-03-15 PROCEDURE — 97112 NEUROMUSCULAR REEDUCATION: CPT

## 2024-03-15 PROCEDURE — 97530 THERAPEUTIC ACTIVITIES: CPT

## 2024-03-15 NOTE — PROGRESS NOTES
[facility]   Physical Therapy Treatment Note      Name: Thomas Taveras  Clinic Number: [id number]    Therapy Diagnosis:   Encounter Diagnoses   Name Primary?    Right knee pain, unspecified chronicity Yes    Decreased ROM of right knee        Physician: Teodoro Marcus MD    Visit Date: 3/15/2024    Physician Orders: PT Eval and Treat   Medical Diagnosis from Referral:      M12.561 (ICD-10-CM) - Traumatic arthritis of right knee   Z98.890 (ICD-10-CM) - S/P ACL reconstruction      Evaluation Date: 1/12/2024  Authorization Period Expiration: 01/01/2025   Plan of Care Expiration: 5/12/2024  Progress Note Due: 3/12/2024  Visit # / Visits authorized: 1/ 1; 9/20    FOTO: 1/3     Precautions: Standard      Time In: 1430   Time Out: 1530  Total Appointment Time (timed & untimed codes): 60 minutes    Subjective     Pt reports: Knee felt sore for a few days after last visit; has subsided since.    FOTO IE - 66%  FOTO 2/23/24 - 74%  FOTO Goal - 77%    He was compliant with home exercise program.  Response to previous treatment: min complaints  Functional change: ongoing    Pain: 2/10  Location: right knee      Objective      Objective Measures updated at progress report unless specified.     Biodex Lower Extremity Strength LSI 1/12/24: - Physical performance Test  Knee Extension   Knee Flexion     60 deg/sec 68.8% deficit 60 deg/sec 0.4% deficit   180 deg/sec 58.1% deficit 180 deg/sec 4.6% deficit   300 deg/sec 34.7% deficit 300 deg/sec 10.6% deficit      Biodex Lower Extremity Strength LSI 3/1/24: - Physical performance Test  Knee Extension   Knee Flexion     60 deg/sec 67.8% deficit 60 deg/sec 7.8% stronger   180 deg/sec 46.4% deficit 180 deg/sec 4.5% deficit   300 deg/sec 35.9% deficit 300 deg/sec 10.4% stronger     LSI 35-65%    Biodex Lower Extremity Strength LSI 1/12/24 - 3/1/24: - Physical performance Test  Knee Extension   Knee Flexion     60 deg/sec 14.6% stronger 60 deg/sec 9.0% stronger    180 deg/sec 27.2% stronger 180 deg/sec 4.6% stronger   300 deg/sec 5.4% stronger 300 deg/sec 5.8% deficit       Treatment     Thomas received the treatments listed below:      neuromuscular re-education activities to improve: Balance, Coordination, Kinesthetic, Sense, Proprioception, and Posture for 50 minutes. The following activities were included:     Patient education:  - cuing for form  - importance of quad NM activation    M-Trigger Biofeedback:  - SL shuttle 3B x8 min w/ 10 sec hold ~700 microV  - SL Shuttle eccentric 2B x20 reps ~700 microV  - SL Shuttle eccentric 3B x20 reps ~700 microV  - Eccentric LAQ 10lbs x15 w/ ~700 microV    BFR 80% 30:15:15:15:  - LAQ 7.5 lbs  - HS curl 7.5 lbs  - SAQ 5 lbs  - NT  - SL shuttle 2B (last two sets had to switch to DL) - quad focus  - NT      NEXT VISIT - BFR / Biofeedback      Held:  - Sidelying SL shuttle 2B1R x6 min w/ 10 sec holds ~700 microV  - Sidelying SL shuttle eccentric 1B1R x15 reps ~700 microV  - Knee extension matrix eccentric 25# DL 3x10  - DL shuttle 3B1R eccentric 2B x15 reps ~500 microV  - DL shuttle 3B1R eccentric 2B x15 reps ~500 microV  - Knee ext iso at 60-70 deg x6 min  w/ 10 sec holds ~800 microV    therapeutic activities to improve functional performance for 10 minutes, including:     Patient education:  - Cont working out quad M and W with ; F with PT    Bike (RANDOM / alternating resistances) x10 min    Held:  Sled push 90# x5 laps  Sled pull 45# x5 laps      Patient Education and Home Exercises       Education provided:   - see above    Written Home Exercises Provided: Patient instructed to cont prior HEP. Exercises were reviewed and Thomas was able to demonstrate them prior to the end of the session.  Thomas demonstrated good  understanding of the education provided. See EMR under Patient Instructions for exercises provided during therapy sessions    Assessment     Increased soreness after last visit due to difficulty with  loading of quad on sled. Good jina to all interventions within visit with good quad NM activation and challenge to strength.    Will require continued focus and practice for quad NM activation and strength going forward secondary to prolonged deficits and LSI differences.    Thomas Is progressing well towards his goals.   Pt prognosis is Good.     Pt will continue to benefit from skilled outpatient physical therapy to address the deficits listed in the problem list box on initial evaluation, provide pt/family education and to maximize pt's level of independence in the home and community environment.     Pt's spiritual, cultural and educational needs considered and pt agreeable to plan of care and goals.     Anticipated barriers to physical therapy: compliance and understanding of condition     GOALS: Short Term Goals: 0-8 weeks  1.Report decreased R knee pain  < / =  2/10  to increase tolerance for ADLs  2. Increase knee ROM to full and pain free in order to be able to perform ADLs without difficulty.  3. All Biodex Testing within 80% LSI for quad  4. Pt to tolerate HEP to improve ROM and independence with ADL's     Long Term Goals: 7-16 weeks  1.Report decreased R knee pain < / = 1/10  to increase tolerance for ADLs  2.Patient goal: return to running, jumping, and jiu jitsu   3. All Biodex Testing within 90% LSI for quad  4. Able to pass return to sport testing (VAIL)  4. Pt will report at CJ level (20-40% impaired) on FOTO knee to demonstrate increase in LE function with every day tasks.     Plan     Plan of care Certification: 1/12/2024 to 5/12/2024.     Outpatient Physical Therapy 1 times weekly for 12-16 weeks to include the following interventions: Electrical Stimulation NMES, Manual Therapy, Moist Heat/ Ice, Neuromuscular Re-ed, Patient Education, Self Care, Therapeutic Activities, and Therapeutic Exercise.    YOLETTE MEDINA, PT, DPT, OCS

## 2024-03-22 ENCOUNTER — CLINICAL SUPPORT (OUTPATIENT)
Dept: REHABILITATION | Facility: HOSPITAL | Age: 49
End: 2024-03-22
Payer: COMMERCIAL

## 2024-03-22 DIAGNOSIS — M25.561 RIGHT KNEE PAIN, UNSPECIFIED CHRONICITY: Primary | ICD-10-CM

## 2024-03-22 DIAGNOSIS — M25.661 DECREASED ROM OF RIGHT KNEE: ICD-10-CM

## 2024-03-22 PROCEDURE — 97530 THERAPEUTIC ACTIVITIES: CPT

## 2024-03-22 PROCEDURE — 97112 NEUROMUSCULAR REEDUCATION: CPT

## 2024-03-22 NOTE — PROGRESS NOTES
[facility]   Physical Therapy Treatment Note      Name: Thomas Taveras  Clinic Number: [id number]    Therapy Diagnosis:   Encounter Diagnoses   Name Primary?    Right knee pain, unspecified chronicity Yes    Decreased ROM of right knee      Physician: Teodoro Marcus MD    Visit Date: 3/22/2024    Physician Orders: PT Eval and Treat   Medical Diagnosis from Referral:      M12.561 (ICD-10-CM) - Traumatic arthritis of right knee   Z98.890 (ICD-10-CM) - S/P ACL reconstruction      Evaluation Date: 1/12/2024  Authorization Period Expiration: 01/01/2025   Plan of Care Expiration: 5/12/2024  Progress Note Due: 3/12/2024  Visit # / Visits authorized: 1/ 1; 10/20    FOTO: 1/3     Precautions: Standard      Time In: 1430  Time Out: 1530   Total Appointment Time (timed & untimed codes): 65 minutes    Subjective     Pt reports: No complaints today.    FOTO IE - 66%  FOTO 2/23/24 - 74%  FOTO Goal - 77%    He was compliant with home exercise program.  Response to previous treatment: min complaints  Functional change: ongoing    Pain: 2/10  Location: right knee      Objective      Objective Measures updated at progress report unless specified.     Biodex Lower Extremity Strength LSI 1/12/24: - Physical performance Test  Knee Extension   Knee Flexion     60 deg/sec 68.8% deficit 60 deg/sec 0.4% deficit   180 deg/sec 58.1% deficit 180 deg/sec 4.6% deficit   300 deg/sec 34.7% deficit 300 deg/sec 10.6% deficit      Biodex Lower Extremity Strength LSI 3/1/24: - Physical performance Test  Knee Extension   Knee Flexion     60 deg/sec 67.8% deficit 60 deg/sec 7.8% stronger   180 deg/sec 46.4% deficit 180 deg/sec 4.5% deficit   300 deg/sec 35.9% deficit 300 deg/sec 10.4% stronger     LSI 35-65%    Biodex Lower Extremity Strength LSI 1/12/24 - 3/1/24: - Physical performance Test  Knee Extension   Knee Flexion     60 deg/sec 14.6% stronger 60 deg/sec 9.0% stronger   180 deg/sec 27.2% stronger 180 deg/sec 4.6%  stronger   300 deg/sec 5.4% stronger 300 deg/sec 5.8% deficit       Treatment     Thomas received the treatments listed below:      neuromuscular re-education activities to improve: Balance, Coordination, Kinesthetic, Sense, Proprioception, and Posture for 50 minutes. The following activities were included:     Patient education:  - cuing for form  - importance of quad NM activation    M-Trigger Biofeedback:  - SL shuttle 4B x8 min w/ 10 sec hold ~700 microV  - SL Shuttle eccentric 4B x20 reps ~700 microV  - Eccentric LAQ 10lbs x15 w/ ~700 microV - NT    BFR 80% 30:15:15:15:  - Offset Bridge  - SAQ 5lbs  - LAQ 7.5 lbs  - HS curl 7.5 lbs - NT  - SL shuttle 2B (last two sets had to switch to DL) - quad focus  - NT      NEXT VISIT - BFR / Biofeedback      Held:  - Sidelying SL shuttle 2B1R x6 min w/ 10 sec holds ~700 microV  - Sidelying SL shuttle eccentric 1B1R x15 reps ~700 microV  - Knee extension matrix eccentric 25# DL 3x10  - DL shuttle 3B1R eccentric 2B x15 reps ~500 microV  - DL shuttle 3B1R eccentric 2B x15 reps ~500 microV  - Knee ext iso at 60-70 deg x6 min  w/ 10 sec holds ~800 microV    therapeutic activities to improve functional performance for 15 minutes, including:     Patient education:  - Cont working out quad M and W with ; F with PT    Bike (RANDOM / alternating resistances) x15 min    Held:  Sled push 90# x5 laps  Sled pull 45# x5 laps      Patient Education and Home Exercises       Education provided:   - see above    Written Home Exercises Provided: Patient instructed to cont prior HEP. Exercises were reviewed and Thomas was able to demonstrate them prior to the end of the session.  Thomas demonstrated good  understanding of the education provided. See EMR under Patient Instructions for exercises provided during therapy sessions    Assessment     Good jina to loading of quad via biofeedback and with BFR. Patient reports difficulty with proprioception / use of quad outside of these  interventions, but cont to work on strength with .    Will require continued focus and practice for quad NM activation and strength going forward secondary to prolonged deficits and LSI differences.    Thomas Is progressing well towards his goals.   Pt prognosis is Good.     Pt will continue to benefit from skilled outpatient physical therapy to address the deficits listed in the problem list box on initial evaluation, provide pt/family education and to maximize pt's level of independence in the home and community environment.     Pt's spiritual, cultural and educational needs considered and pt agreeable to plan of care and goals.     Anticipated barriers to physical therapy: compliance and understanding of condition     GOALS: Short Term Goals: 0-8 weeks  1.Report decreased R knee pain  < / =  2/10  to increase tolerance for ADLs  2. Increase knee ROM to full and pain free in order to be able to perform ADLs without difficulty.  3. All Biodex Testing within 80% LSI for quad  4. Pt to tolerate HEP to improve ROM and independence with ADL's     Long Term Goals: 7-16 weeks  1.Report decreased R knee pain < / = 1/10  to increase tolerance for ADLs  2.Patient goal: return to running, jumping, and jiu jitsu   3. All Biodex Testing within 90% LSI for quad  4. Able to pass return to sport testing (VAIL)  4. Pt will report at CJ level (20-40% impaired) on FOTO knee to demonstrate increase in LE function with every day tasks.     Plan     Plan of care Certification: 1/12/2024 to 5/12/2024.     Outpatient Physical Therapy 1 times weekly for 12-16 weeks to include the following interventions: Electrical Stimulation NMES, Manual Therapy, Moist Heat/ Ice, Neuromuscular Re-ed, Patient Education, Self Care, Therapeutic Activities, and Therapeutic Exercise.    YOLETTE MEDINA, PT, DPT, OCS

## 2024-04-04 ENCOUNTER — PATIENT MESSAGE (OUTPATIENT)
Dept: REHABILITATION | Facility: HOSPITAL | Age: 49
End: 2024-04-04
Payer: COMMERCIAL

## 2024-04-05 ENCOUNTER — CLINICAL SUPPORT (OUTPATIENT)
Dept: REHABILITATION | Facility: HOSPITAL | Age: 49
End: 2024-04-05
Payer: COMMERCIAL

## 2024-04-05 DIAGNOSIS — M25.561 RIGHT KNEE PAIN, UNSPECIFIED CHRONICITY: Primary | ICD-10-CM

## 2024-04-05 DIAGNOSIS — M25.661 DECREASED ROM OF RIGHT KNEE: ICD-10-CM

## 2024-04-05 PROCEDURE — 97112 NEUROMUSCULAR REEDUCATION: CPT

## 2024-04-05 PROCEDURE — 97530 THERAPEUTIC ACTIVITIES: CPT

## 2024-04-05 NOTE — PROGRESS NOTES
[facility]   Physical Therapy Treatment Note      Name: Thomas Taveras  Clinic Number: [id number]    Therapy Diagnosis:   Encounter Diagnoses   Name Primary?    Right knee pain, unspecified chronicity Yes    Decreased ROM of right knee        Physician: Teodoro Marcus MD    Visit Date: 4/5/2024    Physician Orders: PT Eval and Treat   Medical Diagnosis from Referral:      M12.561 (ICD-10-CM) - Traumatic arthritis of right knee   Z98.890 (ICD-10-CM) - S/P ACL reconstruction      Evaluation Date: 1/12/2024  Authorization Period Expiration: 01/01/2025   Plan of Care Expiration: 5/12/2024  Progress Note Due: 5/12/2024  Visit # / Visits authorized: 1/ 1; 11/20    FOTO: 1/3     Precautions: Standard      Time In: 1330  Time Out:  1430  Total Appointment Time (timed & untimed codes): 65 minutes    Subjective     Pt reports: Cont to have discomfort with prolonged sitting, but otherwise feeling progress. Did a trial of Crono activities recently. No complaints today.    NEXT VISIT - BIODEX    FOTO IE - 66%  FOTO 2/23/24 - 74%  FOTO 4/5/24 - 79%  FOTO Goal - 78%    He was compliant with home exercise program.  Response to previous treatment: min complaints  Functional change: ongoing    Pain: 2/10  Location: right knee      Objective      Objective Measures updated at progress report unless specified.     Biodex Lower Extremity Strength LSI 1/12/24: - Physical performance Test  Knee Extension   Knee Flexion     60 deg/sec 68.8% deficit 60 deg/sec 0.4% deficit   180 deg/sec 58.1% deficit 180 deg/sec 4.6% deficit   300 deg/sec 34.7% deficit 300 deg/sec 10.6% deficit      Biodex Lower Extremity Strength LSI 3/1/24: - Physical performance Test  Knee Extension   Knee Flexion     60 deg/sec 67.8% deficit 60 deg/sec 7.8% stronger   180 deg/sec 46.4% deficit 180 deg/sec 4.5% deficit   300 deg/sec 35.9% deficit 300 deg/sec 10.4% stronger     LSI 35-65%    Biodex Lower Extremity Strength LSI 1/12/24 -  3/1/24: - Physical performance Test  Knee Extension   Knee Flexion     60 deg/sec 14.6% stronger 60 deg/sec 9.0% stronger   180 deg/sec 27.2% stronger 180 deg/sec 4.6% stronger   300 deg/sec 5.4% stronger 300 deg/sec 5.8% deficit       Treatment     Thomas received the treatments listed below:      neuromuscular re-education activities to improve: Balance, Coordination, Kinesthetic, Sense, Proprioception, and Posture for 50 minutes. The following activities were included:     Patient education:  - cuing for form  - importance of quad NM activation    M-Trigger Biofeedback:  - Eccentric LAQ 10lbs x8 min with 10 sec descent ~700 microV  - Eccentric SAQ 10lbs x8 min with 10 sec descent ~700 microV  - SL Shuttle 4B x8 min w/ 10 sec hold ~700 microV - NT  - SL Shuttle eccentric 4B x20 resps ~700 microV - NT    BFR 80% 30:15:15:15:  - Offset Bridge  - SAQ 7.5lbs  - LAQ 10lbs      NEXT VISIT - BIODEX      Held:  - Sidelying SL shuttle 2B1R x6 min w/ 10 sec holds ~700 microV  - Sidelying SL shuttle eccentric 1B1R x15 reps ~700 microV  - Knee extension matrix eccentric 25# DL 3x10  - DL shuttle 3B1R eccentric 2B x15 reps ~500 microV  - DL shuttle 3B1R eccentric 2B x15 reps ~500 microV  - Knee ext iso at 60-70 deg x6 min  w/ 10 sec holds ~800 microV    therapeutic activities to improve functional performance for 10 minutes, including:     Patient education:  - Cont working out quad M and W with ; F with PT  - hold on jiu jitsu until clearance    Bike (RANDOM / alternating resistances) x10 min    Held:  Sled push 90# x5 laps  Sled pull 45# x5 laps      Patient Education and Home Exercises       Education provided:   - see above    Written Home Exercises Provided: Patient instructed to cont prior HEP. Exercises were reviewed and Thomas was able to demonstrate them prior to the end of the session.  Thomas demonstrated good  understanding of the education provided. See EMR under Patient Instructions for exercises  provided during therapy sessions    Assessment     Cont good quad NM activation and loading with above interventions. Cont proprioception / use of quad deficits outside of these interventions, but this may be slowly improving over time with repetitions.    Cont to advise on holding of higher level activities e.g. jiu jitsu secondary to LSI and strength deficits. Deficits to be reassessed via Biodex at next visit in 2 weeks.    Will require continued focus and practice for quad NM activation and strength going forward secondary to prolonged deficits and LSI differences.    Thomas Is progressing well towards his goals.   Pt prognosis is Good.     Pt will continue to benefit from skilled outpatient physical therapy to address the deficits listed in the problem list box on initial evaluation, provide pt/family education and to maximize pt's level of independence in the home and community environment.     Pt's spiritual, cultural and educational needs considered and pt agreeable to plan of care and goals.     Anticipated barriers to physical therapy: compliance and understanding of condition     GOALS: Short Term Goals: 0-8 weeks  1.Report decreased R knee pain  < / =  2/10  to increase tolerance for ADLs  2. Increase knee ROM to full and pain free in order to be able to perform ADLs without difficulty.  3. All Biodex Testing within 80% LSI for quad  4. Pt to tolerate HEP to improve ROM and independence with ADL's     Long Term Goals: 7-16 weeks  1.Report decreased R knee pain < / = 1/10  to increase tolerance for ADLs  2.Patient goal: return to running, jumping, and jiu jitsu   3. All Biodex Testing within 90% LSI for quad  4. Able to pass return to sport testing (VAIL)  4. Pt will report at CJ level (20-40% impaired) on FOTO knee to demonstrate increase in LE function with every day tasks.     Plan     Plan of care Certification: 1/12/2024 to 5/12/2024.     Outpatient Physical Therapy 1 times weekly for 12-16 weeks to  include the following interventions: Electrical Stimulation NMES, Manual Therapy, Moist Heat/ Ice, Neuromuscular Re-ed, Patient Education, Self Care, Therapeutic Activities, and Therapeutic Exercise.    YOLETTE MEDINA, PT, DPT, OCS

## 2024-04-19 ENCOUNTER — CLINICAL SUPPORT (OUTPATIENT)
Dept: REHABILITATION | Facility: HOSPITAL | Age: 49
End: 2024-04-19
Payer: COMMERCIAL

## 2024-04-19 DIAGNOSIS — M25.561 RIGHT KNEE PAIN, UNSPECIFIED CHRONICITY: Primary | ICD-10-CM

## 2024-04-19 DIAGNOSIS — M25.661 DECREASED ROM OF RIGHT KNEE: ICD-10-CM

## 2024-04-19 PROCEDURE — 97112 NEUROMUSCULAR REEDUCATION: CPT

## 2024-04-19 PROCEDURE — 97750 PHYSICAL PERFORMANCE TEST: CPT | Mod: 59

## 2024-04-19 PROCEDURE — 97530 THERAPEUTIC ACTIVITIES: CPT

## 2024-04-19 NOTE — PROGRESS NOTES
[facility]   Physical Therapy Treatment Note      Name: Thomas Taveras  Clinic Number: [id number]    Therapy Diagnosis:   Encounter Diagnoses   Name Primary?    Right knee pain, unspecified chronicity Yes    Decreased ROM of right knee      Physician: Teodoro Marcus MD    Visit Date: 4/19/2024    Physician Orders: PT Eval and Treat   Medical Diagnosis from Referral:      M12.561 (ICD-10-CM) - Traumatic arthritis of right knee   Z98.890 (ICD-10-CM) - S/P ACL reconstruction      Evaluation Date: 1/12/2024  Authorization Period Expiration: 12/31/2024  Plan of Care Expiration: 5/12/2024  Progress Note Due: 5/12/2024  Visit # / Visits authorized: 1/ 1; 12/20   FOTO: 1/3     Precautions: Standard      Time In: 1430  Time Out:  1530  Total Appointment Time (timed & untimed codes): 60 minutes    Subjective     Pt reports: Reports last Friday he felt a pop in his knee (nonpainful) and since has had some swelling in his knee. Has stayed away from leg exercises due to this and his  was out of town.    FOTO IE - 66%  FOTO 2/23/24 - 74%  FOTO 4/5/24 - 79%  FOTO Goal - 78%    He was compliant with home exercise program.  Response to previous treatment: min complaints  Functional change: ongoing    Pain: 2/10  Location: right knee      Objective      Objective Measures updated at progress report unless specified.     Biodex Lower Extremity Strength LSI 3/1/24: - Physical performance Test  Knee Extension   Knee Flexion     60 deg/sec 67.8% deficit 60 deg/sec 7.8% stronger   180 deg/sec 46.4% deficit 180 deg/sec 4.5% deficit   300 deg/sec 35.9% deficit 300 deg/sec 10.4% stronger   -----------------------------------------------------------------------------------------------------------------------------------------------------------  [employer] Lower Extremity Strength [facility] 4/19/24: - Physical performance Test x15 min  Knee Extension   Knee Flexion     60 deg/sec 61.0% deficit 60 deg/sec  10.1% stronger   180 deg/sec 49.8% deficit 180 deg/sec 1.5% deficit   300 deg/sec 36.8% deficit 300 deg/sec 1.5% stronger     LSI 35-61%    [employer]x Lower Extremity Strength LSI 3/1/24 - 4/19/24: - Physical performance Test  Knee Extension   Knee Flexion     60 deg/sec 8.9% stronger 60 deg/sec 1.2% deficit   180 deg/sec 6.7% deficit 180 deg/sec 7.8% stronger   300 deg/sec 10.6% stronger 300 deg/sec 3.1% deficit       Treatment     Thomas received the treatments listed below:      neuromuscular re-education activities to improve: Balance, Coordination, Kinesthetic, Sense, Proprioception, and Posture for 30 minutes. The following activities were included:     Patient education:  - cuing for form  - importance of quad NM activation    ASLR 20x5 sec ea tiffany  Bridge GTB heels 20x5 sec    BFR 80% 30:15:15:15:  - SAQ 7.5lbs  - LAQ 7.5lbs      NEXT VISIT - POC / Visits      Held:  M-Trigger Biofeedback:  - Eccentric LAQ 10lbs x8 min with 10 sec descent ~700 microV  - Eccentric SAQ 10lbs x8 min with 10 sec descent ~700 microV  - SL Shuttle 4B x8 min w/ 10 sec hold ~700 microV - NT  - SL Shuttle eccentric 4B x20 resps ~700 microV - NT  _________________________________________________________________________________________  - Sidelying SL shuttle 2B1R x6 min w/ 10 sec holds ~700 microV  - Sidelying SL shuttle eccentric 1B1R x15 reps ~700 microV  - Knee extension matrix eccentric 25# DL 3x10  - DL shuttle 3B1R eccentric 2B x15 reps ~500 microV  - DL shuttle 3B1R eccentric 2B x15 reps ~500 microV  - Knee ext iso at 60-70 deg x6 min  w/ 10 sec holds ~800 microV    therapeutic activities to improve functional performance for 15 minutes, including:     Patient education:  - Cont working out quad M and W with ; F with PT  - hold on Craftistaskayla Craftistaszach until clearance  - consistency and frequency of quad specific strengthening  - review of biodex testing    Bike (RANDOM / alternating resistances) x8 min    Held:  Sled push 90# x5  laps  Sled pull 45# x5 laps      Patient Education and Home Exercises       Education provided:   - see above    Written Home Exercises Provided: Patient instructed to cont prior HEP. Exercises were reviewed and Thomas was able to demonstrate them prior to the end of the session.  Thomas demonstrated good  understanding of the education provided. See EMR under Patient Instructions for exercises provided during therapy sessions    Assessment     Biodex assessment perform ~7 weeks since last assessment. Patient demonstrates a small 8-10% improvement in strength of quad since last assessment, though LSI continues to remain poor. Again discussed necessity of quad strength in terms of overall function and return to sport activities along with increasing frequency and specificity of quad strength with .    Cont to advise on holding of higher level activities e.g. jiu jitsu secondary to LSI and strength deficits.    Will require continued focus and practice for quad NM activation and strength going forward secondary to prolonged deficits and LSI differences.    Thomas Is progressing well towards his goals.   Pt prognosis is Good.     Pt will continue to benefit from skilled outpatient physical therapy to address the deficits listed in the problem list box on initial evaluation, provide pt/family education and to maximize pt's level of independence in the home and community environment.     Pt's spiritual, cultural and educational needs considered and pt agreeable to plan of care and goals.     Anticipated barriers to physical therapy: compliance and understanding of condition     GOALS: Short Term Goals: 0-8 weeks  1.Report decreased R knee pain  < / =  2/10  to increase tolerance for ADLs  2. Increase knee ROM to full and pain free in order to be able to perform ADLs without difficulty.  3. All Biodex Testing within 80% LSI for quad  4. Pt to tolerate HEP to improve ROM and independence with ADL's     Long  Term Goals: 7-16 weeks  1.Report decreased R knee pain < / = 1/10  to increase tolerance for ADLs  2.Patient goal: return to running, jumping, and jiu jitsu   3. All Biodex Testing within 90% LSI for quad  4. Able to pass return to sport testing (VAIL)  4. Pt will report at CJ level (20-40% impaired) on FOTO knee to demonstrate increase in LE function with every day tasks.     Plan     Plan of care Certification: 1/12/2024 to 5/12/2024.     Outpatient Physical Therapy 1 times weekly for 12-16 weeks to include the following interventions: Electrical Stimulation NMES, Manual Therapy, Moist Heat/ Ice, Neuromuscular Re-ed, Patient Education, Self Care, Therapeutic Activities, and Therapeutic Exercise.    YOLETTE MEDINA, PT, DPT, OCS

## 2024-04-26 ENCOUNTER — CLINICAL SUPPORT (OUTPATIENT)
Dept: REHABILITATION | Facility: HOSPITAL | Age: 49
End: 2024-04-26
Payer: COMMERCIAL

## 2024-04-26 DIAGNOSIS — M25.661 DECREASED ROM OF RIGHT KNEE: ICD-10-CM

## 2024-04-26 DIAGNOSIS — M25.561 RIGHT KNEE PAIN, UNSPECIFIED CHRONICITY: Primary | ICD-10-CM

## 2024-04-26 PROCEDURE — 97112 NEUROMUSCULAR REEDUCATION: CPT

## 2024-04-26 PROCEDURE — 97530 THERAPEUTIC ACTIVITIES: CPT

## 2024-04-26 NOTE — PROGRESS NOTES
[facility]   Physical Therapy Treatment Note      Name: Thomas Taveras  Clinic Number: [id number]    Therapy Diagnosis:   Encounter Diagnoses   Name Primary?    Right knee pain, unspecified chronicity Yes    Decreased ROM of right knee      Physician: Teodoro Marcus MD    Visit Date: 4/26/2024    Physician Orders: PT Eval and Treat   Medical Diagnosis from Referral:      M12.561 (ICD-10-CM) - Traumatic arthritis of right knee   Z98.890 (ICD-10-CM) - S/P ACL reconstruction      Evaluation Date: 1/12/2024  Authorization Period Expiration: 12/31/2024  Plan of Care Expiration: 6/14/2024  Progress Note Due: 6/14/2024  Visit # / Visits authorized: 1/ 1; 13/20   FOTO: 1/3     Precautions: Standard      Time In: 1430  Time Out: 1525  Total Appointment Time (timed & untimed codes): 55 minutes    Subjective     Pt reports: Has increased compliance with quad exercises with  and ind. Slight soreness after one of his workouts when he added too much weight, but this resolved well.    FOTO IE - 66%  FOTO 2/23/24 - 74%  FOTO 4/5/24 - 79%  FOTO Goal - 78%    He was compliant with home exercise program.  Response to previous treatment: min complaints  Functional change: ongoing    Pain: 2/10  Location: right knee      Objective      Objective Measures updated at progress report unless specified.     [employer]x Lower Extremity Strength LSI 3/1/24: - Physical performance Test  Knee Extension   Knee Flexion     60 deg/sec 67.8% deficit 60 deg/sec 7.8% stronger   180 deg/sec 46.4% deficit 180 deg/sec 4.5% deficit   300 deg/sec 35.9% deficit 300 deg/sec 10.4% stronger   -----------------------------------------------------------------------------------------------------------------------------------------------------------  [employer]x Lower Extremity Strength [facility] 4/19/24: - Physical performance Test x15 min  Knee Extension   Knee Flexion     60 deg/sec 61.0% deficit 60 deg/sec 10.1% stronger   180  deg/sec 49.8% deficit 180 deg/sec 1.5% deficit   300 deg/sec 36.8% deficit 300 deg/sec 1.5% stronger     LSI 35-61%    [employer] Lower Extremity Strength LSI 3/1/24 - 4/19/24: - Physical performance Test  Knee Extension   Knee Flexion     60 deg/sec 8.9% stronger 60 deg/sec 1.2% deficit   180 deg/sec 6.7% deficit 180 deg/sec 7.8% stronger   300 deg/sec 10.6% stronger 300 deg/sec 3.1% deficit       Treatment     Thomas received the treatments listed below:      neuromuscular re-education activities to improve: Balance, Coordination, Kinesthetic, Sense, Proprioception, and Posture for 45 minutes. The following activities were included:     Patient education:  - cuing for form  - importance of quad NM activation    ASLR 3lbs 20x5 sec    BFR 80% 30:15:15:15:  - SAQ 7.5lbs  - LAQ 7.5lbs  - Bridge GTB  - Prone HS curl 7.5lbs      NEXT VISIT - progress and reassess every 2-3 weeks; (increase load / BFR)      Held:  M-Trigger Biofeedback:  - Eccentric LAQ 10lbs x8 min with 10 sec descent ~700 microV  - Eccentric SAQ 10lbs x8 min with 10 sec descent ~700 microV  - SL Shuttle 4B x8 min w/ 10 sec hold ~700 microV - NT  - SL Shuttle eccentric 4B x20 resps ~700 microV - NT  _________________________________________________________________________________________  - Sidelying SL shuttle 2B1R x6 min w/ 10 sec holds ~700 microV  - Sidelying SL shuttle eccentric 1B1R x15 reps ~700 microV  - Knee extension matrix eccentric 25# DL 3x10  - DL shuttle 3B1R eccentric 2B x15 reps ~500 microV  - DL shuttle 3B1R eccentric 2B x15 reps ~500 microV  - Knee ext iso at 60-70 deg x6 min  w/ 10 sec holds ~800 microV    therapeutic activities to improve functional performance for 10 minutes, including:     Patient education:  - Cont working out quad M and W with ; F with PT  - hold on kisha marquiszach until clearance  - consistency and frequency of quad specific strengthening  - review of biodex testing    Bike (RANDOM / alternating  resistances) x10 min      Held:  Sled push 90# x5 laps  Sled pull 45# x5 laps      Patient Education and Home Exercises       Education provided:   - see above    Written Home Exercises Provided: Patient instructed to cont prior HEP. Exercises were reviewed and Thomas was able to demonstrate them prior to the end of the session.  Thomas demonstrated good  understanding of the education provided. See EMR under Patient Instructions for exercises provided during therapy sessions    Assessment     Patient demonstrating increased compliance with quad strengthening ind outside of clinic. Good jina to interventions focused on augmentation of quad strength within visit. Reduce frequency of PT to every 1 visit every 2-3 weeks due to increased independence and understanding of needed progression. Planned Biodex reassessment on 6/14/24.    Cont to advise on holding of higher level activities e.g. jiu jitsu secondary to LSI and strength deficits.    Will require continued focus and practice for quad NM activation and strength going forward secondary to prolonged deficits and LSI differences as demonstrated above.     Thoams Is progressing well towards his goals.   Pt prognosis is Good.     Pt will continue to benefit from skilled outpatient physical therapy to address the deficits listed in the problem list box on initial evaluation, provide pt/family education and to maximize pt's level of independence in the home and community environment.     Pt's spiritual, cultural and educational needs considered and pt agreeable to plan of care and goals.     Anticipated barriers to physical therapy: compliance and understanding of condition     GOALS: Short Term Goals: 0-8 weeks  1.Report decreased R knee pain  < / =  2/10  to increase tolerance for ADLs  2. Increase knee ROM to full and pain free in order to be able to perform ADLs without difficulty.  3. All Biodex Testing within 80% LSI for quad  4. Pt to tolerate HEP to improve ROM and  independence with ADL's     Long Term Goals: 7-16 weeks  1.Report decreased R knee pain < / = 1/10  to increase tolerance for ADLs  2.Patient goal: return to running, jumping, and jiu jitsu   3. All Biodex Testing within 90% LSI for quad  4. Able to pass return to sport testing (VAIL)  4. Pt will report at CJ level (20-40% impaired) on FOTO knee to demonstrate increase in LE function with every day tasks.     Plan     Plan of care Certification: 1/12/2024 to 6/14/2024.     Outpatient Physical Therapy 1 times weekly for 12-16 weeks to include the following interventions: Electrical Stimulation NMES, Manual Therapy, Moist Heat/ Ice, Neuromuscular Re-ed, Patient Education, Self Care, Therapeutic Activities, and Therapeutic Exercise.    YOLETTE MEDINA, PT, DPT, OCS

## 2024-04-27 NOTE — PLAN OF CARE
[facility]   Physical Therapy Treatment Note      Name: Thomas Taveras  Clinic Number: [id number]    Therapy Diagnosis:   Encounter Diagnoses   Name Primary?    Right knee pain, unspecified chronicity Yes    Decreased ROM of right knee      Physician: Teodoro Marcus MD    Visit Date: 4/26/2024    Physician Orders: PT Eval and Treat   Medical Diagnosis from Referral:      M12.561 (ICD-10-CM) - Traumatic arthritis of right knee   Z98.890 (ICD-10-CM) - S/P ACL reconstruction      Evaluation Date: 1/12/2024  Authorization Period Expiration: 12/31/2024  Plan of Care Expiration: 6/14/2024  Progress Note Due: 6/14/2024  Visit # / Visits authorized: 1/ 1; 13/20   FOTO: 1/3     Precautions: Standard      Time In: 1430  Time Out: 1525  Total Appointment Time (timed & untimed codes): 55 minutes    Subjective     Pt reports: Has increased compliance with quad exercises with  and ind. Slight soreness after one of his workouts when he added too much weight, but this resolved well.    FOTO IE - 66%  FOTO 2/23/24 - 74%  FOTO 4/5/24 - 79%  FOTO Goal - 78%    He was compliant with home exercise program.  Response to previous treatment: min complaints  Functional change: ongoing    Pain: 2/10  Location: right knee      Objective      Objective Measures updated at progress report unless specified.     [employer]x Lower Extremity Strength LSI 3/1/24: - Physical performance Test  Knee Extension   Knee Flexion     60 deg/sec 67.8% deficit 60 deg/sec 7.8% stronger   180 deg/sec 46.4% deficit 180 deg/sec 4.5% deficit   300 deg/sec 35.9% deficit 300 deg/sec 10.4% stronger   -----------------------------------------------------------------------------------------------------------------------------------------------------------  Biodex Lower Extremity Strength [facility] 4/19/24: - Physical performance Test x15 min  Knee Extension   Knee Flexion     60 deg/sec 61.0% deficit 60 deg/sec 10.1% stronger   180  deg/sec 49.8% deficit 180 deg/sec 1.5% deficit   300 deg/sec 36.8% deficit 300 deg/sec 1.5% stronger     LSI 35-61%    [employer] Lower Extremity Strength LSI 3/1/24 - 4/19/24: - Physical performance Test  Knee Extension   Knee Flexion     60 deg/sec 8.9% stronger 60 deg/sec 1.2% deficit   180 deg/sec 6.7% deficit 180 deg/sec 7.8% stronger   300 deg/sec 10.6% stronger 300 deg/sec 3.1% deficit       Treatment     Thomas received the treatments listed below:      neuromuscular re-education activities to improve: Balance, Coordination, Kinesthetic, Sense, Proprioception, and Posture for 45 minutes. The following activities were included:     Patient education:  - cuing for form  - importance of quad NM activation    ASLR 3lbs 20x5 sec    BFR 80% 30:15:15:15:  - SAQ 7.5lbs  - LAQ 7.5lbs  - Bridge GTB  - Prone HS curl 7.5lbs      NEXT VISIT - progress and reassess every 2-3 weeks; (increase load / BFR)      Held:  M-Trigger Biofeedback:  - Eccentric LAQ 10lbs x8 min with 10 sec descent ~700 microV  - Eccentric SAQ 10lbs x8 min with 10 sec descent ~700 microV  - SL Shuttle 4B x8 min w/ 10 sec hold ~700 microV - NT  - SL Shuttle eccentric 4B x20 resps ~700 microV - NT  _________________________________________________________________________________________  - Sidelying SL shuttle 2B1R x6 min w/ 10 sec holds ~700 microV  - Sidelying SL shuttle eccentric 1B1R x15 reps ~700 microV  - Knee extension matrix eccentric 25# DL 3x10  - DL shuttle 3B1R eccentric 2B x15 reps ~500 microV  - DL shuttle 3B1R eccentric 2B x15 reps ~500 microV  - Knee ext iso at 60-70 deg x6 min  w/ 10 sec holds ~800 microV    therapeutic activities to improve functional performance for 10 minutes, including:     Patient education:  - Cont working out quad M and W with ; F with PT  - hold on kisha marquiszach until clearance  - consistency and frequency of quad specific strengthening  - review of biodex testing    Bike (RANDOM / alternating  resistances) x10 min      Held:  Sled push 90# x5 laps  Sled pull 45# x5 laps      Patient Education and Home Exercises       Education provided:   - see above    Written Home Exercises Provided: Patient instructed to cont prior HEP. Exercises were reviewed and Thomas was able to demonstrate them prior to the end of the session.  Thomas demonstrated good  understanding of the education provided. See EMR under Patient Instructions for exercises provided during therapy sessions    Assessment     Patient demonstrating increased compliance with quad strengthening ind outside of clinic. Good jina to interventions focused on augmentation of quad strength within visit. Reduce frequency of PT to every 1 visit every 2-3 weeks due to increased independence and understanding of needed progression. Planned Biodex reassessment on 6/14/24.    Cont to advise on holding of higher level activities e.g. jiu jitsu secondary to LSI and strength deficits.    Will require continued focus and practice for quad NM activation and strength going forward secondary to prolonged deficits and LSI differences as demonstrated above.     Thomas Is progressing well towards his goals.   Pt prognosis is Good.     Pt will continue to benefit from skilled outpatient physical therapy to address the deficits listed in the problem list box on initial evaluation, provide pt/family education and to maximize pt's level of independence in the home and community environment.     Pt's spiritual, cultural and educational needs considered and pt agreeable to plan of care and goals.     Anticipated barriers to physical therapy: compliance and understanding of condition     GOALS: Short Term Goals: 0-8 weeks  1.Report decreased R knee pain  < / =  2/10  to increase tolerance for ADLs  2. Increase knee ROM to full and pain free in order to be able to perform ADLs without difficulty.  3. All Biodex Testing within 80% LSI for quad  4. Pt to tolerate HEP to improve ROM and  independence with ADL's     Long Term Goals: 7-16 weeks  1.Report decreased R knee pain < / = 1/10  to increase tolerance for ADLs  2.Patient goal: return to running, jumping, and jiu jitsu   3. All Biodex Testing within 90% LSI for quad  4. Able to pass return to sport testing (VAIL)  4. Pt will report at CJ level (20-40% impaired) on FOTO knee to demonstrate increase in LE function with every day tasks.     Plan     Plan of care Certification: 1/12/2024 to 6/14/2024.     Outpatient Physical Therapy 1 times weekly for 12-16 weeks to include the following interventions: Electrical Stimulation NMES, Manual Therapy, Moist Heat/ Ice, Neuromuscular Re-ed, Patient Education, Self Care, Therapeutic Activities, and Therapeutic Exercise.    YOLETTE MEDINA, PT, DPT, OCS

## 2024-05-17 ENCOUNTER — CLINICAL SUPPORT (OUTPATIENT)
Dept: REHABILITATION | Facility: HOSPITAL | Age: 49
End: 2024-05-17
Payer: COMMERCIAL

## 2024-05-17 ENCOUNTER — PATIENT MESSAGE (OUTPATIENT)
Dept: REHABILITATION | Facility: HOSPITAL | Age: 49
End: 2024-05-17

## 2024-05-17 DIAGNOSIS — M25.561 RIGHT KNEE PAIN, UNSPECIFIED CHRONICITY: Primary | ICD-10-CM

## 2024-05-17 DIAGNOSIS — M25.661 DECREASED ROM OF RIGHT KNEE: ICD-10-CM

## 2024-05-17 PROCEDURE — 97112 NEUROMUSCULAR REEDUCATION: CPT

## 2024-05-17 PROCEDURE — 97530 THERAPEUTIC ACTIVITIES: CPT

## 2024-05-17 NOTE — PROGRESS NOTES
[facility]   Physical Therapy Treatment Note      Name: Thomas Taveras  Clinic Number: [id number]    Therapy Diagnosis:   Encounter Diagnoses   Name Primary?    Right knee pain, unspecified chronicity Yes    Decreased ROM of right knee        Physician: Teodoro Marcus MD    Visit Date: 5/17/2024    Physician Orders: PT Eval and Treat   Medical Diagnosis from Referral:      M12.561 (ICD-10-CM) - Traumatic arthritis of right knee   Z98.890 (ICD-10-CM) - S/P ACL reconstruction      Evaluation Date: 1/12/2024  Authorization Period Expiration: 12/31/2024  Plan of Care Expiration: 6/14/2024  Progress Note Due: 6/14/2024  Visit # / Visits authorized: 1/ 1; 13/20   FOTO: 1/3     Precautions: Standard      Time In: 1435  Time Out: 1530  Total Appointment Time (timed & untimed codes): 55 minutes    Subjective     Pt reports: Knee has been feeling pretty good. Today a little sore, but otherwise feeling stronger with .    FOTO IE - 66%  FOTO 2/23/24 - 74%  FOTO 4/5/24 - 79%  FOTO Goal - 78%    He was compliant with home exercise program.  Response to previous treatment: min complaints  Functional change: ongoing    Pain: 2/10  Location: right knee      Objective      Objective Measures updated at progress report unless specified.     Biodex Lower Extremity Strength LSI 3/1/24: - Physical performance Test  Knee Extension   Knee Flexion     60 deg/sec 67.8% deficit 60 deg/sec 7.8% stronger   180 deg/sec 46.4% deficit 180 deg/sec 4.5% deficit   300 deg/sec 35.9% deficit 300 deg/sec 10.4% stronger   -----------------------------------------------------------------------------------------------------------------------------------------------------------  [facility] Lower Extremity Strength [facility] 4/19/24: - Physical performance Test x15 min  Knee Extension   Knee Flexion     60 deg/sec 61.0% deficit 60 deg/sec 10.1% stronger   180 deg/sec 49.8% deficit 180 deg/sec 1.5% deficit   300 deg/sec  36.8% deficit 300 deg/sec 1.5% stronger     LSI 35-61%    [employer]x Lower Extremity Strength LSI 3/1/24 - 4/19/24: - Physical performance Test  Knee Extension   Knee Flexion     60 deg/sec 8.9% stronger 60 deg/sec 1.2% deficit   180 deg/sec 6.7% deficit 180 deg/sec 7.8% stronger   300 deg/sec 10.6% stronger 300 deg/sec 3.1% deficit       Treatment     Thomas received the treatments listed below:      neuromuscular re-education activities to improve: Balance, Coordination, Kinesthetic, Sense, Proprioception, and Posture for 45 minutes. The following activities were included:     Patient education:  - cuing for form  - importance of quad NM activation    BFR 80% 30:15:15:15:  - SLR  - Offset bridge  - SAQ 10 lbs  - SL hip abd  - LAQ 10 lbs      NEXT VISIT - progress and reassess every 2-3 weeks; (increase load / BFR)      Held:  ASLR 3lbs 20x5 sec  - Bridge GTB  - Prone HS curl 7.5lbs  _______________________________________________________________________________________  M-Trigger Biofeedback:  - Eccentric LAQ 10lbs x8 min with 10 sec descent ~700 microV  - Eccentric SAQ 10lbs x8 min with 10 sec descent ~700 microV  - SL Shuttle 4B x8 min w/ 10 sec hold ~700 microV - NT  - SL Shuttle eccentric 4B x20 resps ~700 microV - NT  _________________________________________________________________________________________  - Sidelying SL shuttle 2B1R x6 min w/ 10 sec holds ~700 microV  - Sidelying SL shuttle eccentric 1B1R x15 reps ~700 microV  - Knee extension matrix eccentric 25# DL 3x10  - DL shuttle 3B1R eccentric 2B x15 reps ~500 microV  - DL shuttle 3B1R eccentric 2B x15 reps ~500 microV  - Knee ext iso at 60-70 deg x6 min  w/ 10 sec holds ~800 microV    therapeutic activities to improve functional performance for 10 minutes, including:     Patient education:  - Cont working out quad M and W with ; F with PT  - hold on [employer] until clearance  - consistency and frequency of quad specific strengthening  -  review of biodex testing    Bike (RANDOM / alternating resistances) x10 min    Held:  Sled push 90# x5 laps  Sled pull 45# x5 laps      Patient Education and Home Exercises       Education provided:   - see above    Written Home Exercises Provided: Patient instructed to cont prior HEP. Exercises were reviewed and Thomas was able to demonstrate them prior to the end of the session.  Thomas demonstrated good  understanding of the education provided. See EMR under Patient Instructions for exercises provided during therapy sessions    Assessment     Patient demonstrating increased compliance with quad strengthening ind outside of clinic with subjective improvements in function gradually over time. Cont good jina to interventions focused on augmentation of quad strength within visit.     Planned Biodex reassessment on 6/14/24.    Cont to advise on holding of higher level activities e.g. jiu jitsu secondary to LSI and strength deficits.    Will require continued focus and practice for quad NM activation and strength going forward secondary to prolonged deficits and LSI differences as demonstrated above.     Thomas Is progressing well towards his goals.   Pt prognosis is Good.     Pt will continue to benefit from skilled outpatient physical therapy to address the deficits listed in the problem list box on initial evaluation, provide pt/family education and to maximize pt's level of independence in the home and community environment.     Pt's spiritual, cultural and educational needs considered and pt agreeable to plan of care and goals.     Anticipated barriers to physical therapy: compliance and understanding of condition     GOALS: Short Term Goals: 0-8 weeks  1.Report decreased R knee pain  < / =  2/10  to increase tolerance for ADLs  2. Increase knee ROM to full and pain free in order to be able to perform ADLs without difficulty.  3. All Biodex Testing within 80% LSI for quad  4. Pt to tolerate HEP to improve ROM and  independence with ADL's     Long Term Goals: 7-16 weeks  1.Report decreased R knee pain < / = 1/10  to increase tolerance for ADLs  2.Patient goal: return to running, jumping, and jiu jitsu   3. All Biodex Testing within 90% LSI for quad  4. Able to pass return to sport testing (VAIL)  4. Pt will report at CJ level (20-40% impaired) on FOTO knee to demonstrate increase in LE function with every day tasks.     Plan     Plan of care Certification: 1/12/2024 to 6/14/2024.     Outpatient Physical Therapy 1 times weekly for 12-16 weeks to include the following interventions: Electrical Stimulation NMES, Manual Therapy, Moist Heat/ Ice, Neuromuscular Re-ed, Patient Education, Self Care, Therapeutic Activities, and Therapeutic Exercise.    YOLETTE MEDINA, PT, DPT, OCS

## 2024-05-31 ENCOUNTER — CLINICAL SUPPORT (OUTPATIENT)
Dept: REHABILITATION | Facility: HOSPITAL | Age: 49
End: 2024-05-31
Payer: COMMERCIAL

## 2024-05-31 DIAGNOSIS — M25.661 DECREASED ROM OF RIGHT KNEE: ICD-10-CM

## 2024-05-31 DIAGNOSIS — M25.561 RIGHT KNEE PAIN, UNSPECIFIED CHRONICITY: Primary | ICD-10-CM

## 2024-05-31 PROCEDURE — 97112 NEUROMUSCULAR REEDUCATION: CPT

## 2024-05-31 PROCEDURE — 97530 THERAPEUTIC ACTIVITIES: CPT

## 2024-05-31 NOTE — PROGRESS NOTES
[facility]   Physical Therapy Treatment Note      Name: Thomas Taveras  Clinic Number: [id number]    Therapy Diagnosis:   Encounter Diagnoses   Name Primary?    Right knee pain, unspecified chronicity Yes    Decreased ROM of right knee        Physician: Teodoro Marcus MD    Visit Date: 5/31/2024    Physician Orders: PT Eval and Treat   Medical Diagnosis from Referral:      M12.561 (ICD-10-CM) - Traumatic arthritis of right knee   Z98.890 (ICD-10-CM) - S/P ACL reconstruction      Evaluation Date: 1/12/2024  Authorization Period Expiration: 12/31/2024  Plan of Care Expiration: 6/14/2024  Progress Note Due: 6/14/2024  Visit # / Visits authorized: 1/ 1; 14/20   FOTO: 1/3     Precautions: Standard      Time In: 1430  Time Out:  1530  Total Appointment Time (timed & untimed codes): 60 minutes    Subjective     Pt reports: Reports knee has continued to be doing well. Cont to work out with , but has not been going heavy.    FOTO IE - 66%  FOTO 2/23/24 - 74%  FOTO 4/5/24 - 79%  FOTO Goal - 78%    He was compliant with home exercise program.  Response to previous treatment: min complaints  Functional change: ongoing    Pain: 2/10  Location: right knee      Objective      Objective Measures updated at progress report unless specified.     [employer] Lower Extremity Strength LSI 3/1/24: - Physical performance Test  Knee Extension   Knee Flexion     60 deg/sec 67.8% deficit 60 deg/sec 7.8% stronger   180 deg/sec 46.4% deficit 180 deg/sec 4.5% deficit   300 deg/sec 35.9% deficit 300 deg/sec 10.4% stronger   -----------------------------------------------------------------------------------------------------------------------------------------------------------  Biodex Lower Extremity Strength [facility] 4/19/24: - Physical performance Test x15 min  Knee Extension   Knee Flexion     60 deg/sec 61.0% deficit 60 deg/sec 10.1% stronger   180 deg/sec 49.8% deficit 180 deg/sec 1.5% deficit   300  deg/sec 36.8% deficit 300 deg/sec 1.5% stronger     LSI 35-61%    [employer] Lower Extremity Strength LSI 3/1/24 - 4/19/24: - Physical performance Test  Knee Extension   Knee Flexion     60 deg/sec 8.9% stronger 60 deg/sec 1.2% deficit   180 deg/sec 6.7% deficit 180 deg/sec 7.8% stronger   300 deg/sec 10.6% stronger 300 deg/sec 3.1% deficit       Treatment     Thomas received the treatments listed below:      neuromuscular re-education activities to improve: Balance, Coordination, Kinesthetic, Sense, Proprioception, and Posture for 25 minutes. The following activities were included:     Patient education:  - cuing for form  - importance of quad NM activation    BFR 80% 30:15:15:15:  - SLR 3lbs  - LAQ 15 lbs  - SAQ 15 lbs      NEXT VISIT - Biodex      Held:  - Offset bridge  - SL hip abd  ASLR 3lbs 20x5 sec  - Bridge GTB  - Prone HS curl 7.5lbs  _______________________________________________________________________________________  M-Trigger Biofeedback:  - Eccentric LAQ 10lbs x8 min with 10 sec descent ~700 microV  - Eccentric SAQ 10lbs x8 min with 10 sec descent ~700 microV  - SL Shuttle 4B x8 min w/ 10 sec hold ~700 microV - NT  - SL Shuttle eccentric 4B x20 resps ~700 microV - NT  _________________________________________________________________________________________  - Sidelying SL shuttle 2B1R x6 min w/ 10 sec holds ~700 microV  - Sidelying SL shuttle eccentric 1B1R x15 reps ~700 microV  - Knee extension matrix eccentric 25# DL 3x10  - DL shuttle 3B1R eccentric 2B x15 reps ~500 microV  - DL shuttle 3B1R eccentric 2B x15 reps ~500 microV  - Knee ext iso at 60-70 deg x6 min  w/ 10 sec holds ~800 microV    therapeutic activities to improve functional performance for 35 minutes, including:     Patient education:  - Cont working out quad M and W with ; F with PT  - hold on [employer] until clearance  - consistency and frequency of quad specific strengthening   - ok to progress loads with personal      Bike (RANDOM / alternating resistances) x10 min    BFR 80% 30:15:15:15:  - SL Shuttle 1B1R  - SL Shuttle hip extension 1B    Held:  Sled push 90# x5 laps  Sled pull 45# x5 laps      Patient Education and Home Exercises       Education provided:   - see above    Written Home Exercises Provided: Patient instructed to cont prior HEP. Exercises were reviewed and Thomas was able to demonstrate them prior to the end of the session.  Thomas demonstrated good  understanding of the education provided. See EMR under Patient Instructions for exercises provided during therapy sessions    Assessment     Patient demonstrating increased compliance with quad strengthening ind outside of clinic with subjective improvements in function gradually over time. Cont good jina to interventions focused on augmentation of quad strength within visit.     Planned Biodex reassessment at next visit on 6/14/24.    Cont to advise on holding of higher level activities e.g. jiu jitsu secondary to LSI and strength deficits, but clarified it would be good for him to cont to progress load for strengthening activities.    Will require continued focus and practice for quad NM activation and strength going forward secondary to prolonged deficits and LSI differences as demonstrated above.     Thomas Is progressing well towards his goals.   Pt prognosis is Good.     Pt will continue to benefit from skilled outpatient physical therapy to address the deficits listed in the problem list box on initial evaluation, provide pt/family education and to maximize pt's level of independence in the home and community environment.     Pt's spiritual, cultural and educational needs considered and pt agreeable to plan of care and goals.     Anticipated barriers to physical therapy: compliance and understanding of condition     GOALS: Short Term Goals: 0-8 weeks  1.Report decreased R knee pain  < / =  2/10  to increase tolerance for ADLs  2. Increase knee ROM to  full and pain free in order to be able to perform ADLs without difficulty.  3. All Biodex Testing within 80% LSI for quad  4. Pt to tolerate HEP to improve ROM and independence with ADL's     Long Term Goals: 7-16 weeks  1.Report decreased R knee pain < / = 1/10  to increase tolerance for ADLs  2.Patient goal: return to running, jumping, and jiu jitsu   3. All Biodex Testing within 90% LSI for quad  4. Able to pass return to sport testing (VAIL)  4. Pt will report at CJ level (20-40% impaired) on FOTO knee to demonstrate increase in LE function with every day tasks.     Plan     Plan of care Certification: 1/12/2024 to 6/14/2024.     Outpatient Physical Therapy 1 times weekly for 12-16 weeks to include the following interventions: Electrical Stimulation NMES, Manual Therapy, Moist Heat/ Ice, Neuromuscular Re-ed, Patient Education, Self Care, Therapeutic Activities, and Therapeutic Exercise.    YOLETTE MEDINA, PT, DPT, OCS

## 2024-06-14 ENCOUNTER — CLINICAL SUPPORT (OUTPATIENT)
Dept: REHABILITATION | Facility: HOSPITAL | Age: 49
End: 2024-06-14
Payer: COMMERCIAL

## 2024-06-14 ENCOUNTER — PATIENT MESSAGE (OUTPATIENT)
Dept: REHABILITATION | Facility: HOSPITAL | Age: 49
End: 2024-06-14

## 2024-06-14 DIAGNOSIS — M25.661 DECREASED ROM OF RIGHT KNEE: ICD-10-CM

## 2024-06-14 DIAGNOSIS — M25.561 RIGHT KNEE PAIN, UNSPECIFIED CHRONICITY: Primary | ICD-10-CM

## 2024-06-14 PROCEDURE — 97750 PHYSICAL PERFORMANCE TEST: CPT

## 2024-06-14 PROCEDURE — 97530 THERAPEUTIC ACTIVITIES: CPT

## 2024-06-14 PROCEDURE — 97112 NEUROMUSCULAR REEDUCATION: CPT

## 2024-06-14 NOTE — PROGRESS NOTES
[facility] OUTPATIENT THERAPY AND WELLNESS   Physical Therapy Treatment Note      Name: Thomas Taveras  Clinic Number: [id number]    Therapy Diagnosis:   Encounter Diagnoses   Name Primary?    Right knee pain, unspecified chronicity Yes    Decreased ROM of right knee        Physician: Teodoro Marcus MD    Visit Date: 6/14/2024    Physician Orders: PT Eval and Treat   Medical Diagnosis from Referral:      M12.561 (ICD-10-CM) - Traumatic arthritis of right knee   Z98.890 (ICD-10-CM) - S/P ACL reconstruction      Evaluation Date: 1/12/2024  Authorization Period Expiration: 12/31/2024  Plan of Care Expiration: 8/14/2024  Progress Note Due: 8/14/2024  Visit # / Visits authorized: 1/ 1; 15/20   FOTO: 1/3     Precautions: Standard      Time In: 1437  Time Out: 1527  Total Appointment Time (timed & untimed codes): 50 minutes    Subjective     Pt reports: Reports knee has continued to be doing well. Cont to work out with  and with heavier weights.    FOTO IE - 66%  FOTO 2/23/24 - 74%  FOTO 4/5/24 - 79%  FOTO Goal - 78%    He was compliant with home exercise program.  Response to previous treatment: min complaints  Functional change: ongoing    Pain: 2/10  Location: right knee      Objective      Objective Measures updated at progress report unless specified.     Biodex Lower Extremity Strength LSI 4/19/24: - Physical performance Test   Knee Extension   Knee Flexion     60 deg/sec 61.0% deficit 60 deg/sec 10.1% stronger   180 deg/sec 49.8% deficit 180 deg/sec 1.5% deficit   300 deg/sec 36.8% deficit 300 deg/sec 1.5% stronger     LSI 35-61%    _____________________________________________________________________________________    [employer]x Lower Extremity Strength LSI 6/14/24: - Physical performance Test  Knee Extension   Knee Flexion     60 deg/sec 32.7% deficit 60 deg/sec 8.7% stronger   180 deg/sec 21.8% deficit 180 deg/sec 8.2% stronger   300 deg/sec 12.5% deficit 300 deg/sec 4.9% stronger     LSI  70-90%    [employer] Lower Extremity Strength LSI 4/19/24 - 6/14/24: - Physical performance Test x15 min  Knee Extension   Knee Flexion     60 deg/sec 62.6% stronger 60 deg/sec 5.2% stronger   180 deg/sec 57.1% stronger 180 deg/sec 2.9% deficit   300 deg/sec 31.9% stronger 300 deg/sec 0.8% stronger       Treatment     Thomas received the treatments listed below:      neuromuscular re-education activities to improve: Balance, Coordination, Kinesthetic, Sense, Proprioception, and Posture for 10 minutes. The following activities were included:     Patient education:  - cuing for form  - importance of quad NM activation    ASLR 10x10 sec ea tiffany  SL HS Bridge 15x5 sec ea tiffany      NEXT VISIT - Biodex and/or return to sport testing (has not done before) - RTR protocol      Held:  BFR 80% 30:15:15:15:  - SLR 3lbs  - LAQ 15 lbs  - SAQ 15 lbs  - Offset bridge  - SL hip abd  ASLR 3lbs 20x5 sec  - Bridge GTB  - Prone HS curl 7.5lbs  _______________________________________________________________________________________  M-Trigger Biofeedback:  - Eccentric LAQ 10lbs x8 min with 10 sec descent ~700 microV  - Eccentric SAQ 10lbs x8 min with 10 sec descent ~700 microV  - SL Shuttle 4B x8 min w/ 10 sec hold ~700 microV - NT  - SL Shuttle eccentric 4B x20 resps ~700 microV - NT  _________________________________________________________________________________________  - Sidelying SL shuttle 2B1R x6 min w/ 10 sec holds ~700 microV  - Sidelying SL shuttle eccentric 1B1R x15 reps ~700 microV  - Knee extension matrix eccentric 25# DL 3x10  - DL shuttle 3B1R eccentric 2B x15 reps ~500 microV  - DL shuttle 3B1R eccentric 2B x15 reps ~500 microV  - Knee ext iso at 60-70 deg x6 min  w/ 10 sec holds ~800 microV    therapeutic activities to improve functional performance for 10 minutes, including:     Patient education:  - hold on jiu jitsu until clearance  - consistency and frequency of quad specific strengthening   - CONT to progress loads with      Bike x10 min Lvl 5.0      Held:  BFR 80% 30:15:15:15:  - SL Shuttle 1B1R  - SL Shuttle hip extension 1B  Sled push 90# x5 laps  Sled pull 45# x5 laps      Patient Education and Home Exercises       Education provided:   - see above    Written Home Exercises Provided: Patient instructed to cont prior HEP. Exercises were reviewed and Thomas was able to demonstrate them prior to the end of the session.  Thomas demonstrated good  understanding of the education provided. See EMR under Patient Instructions for exercises provided during therapy sessions    Assessment     Patient demonstrates good improvement in quad strength along with significant improvement in LSI since last assessed via Biodex (see above). Patient not at 90% LSI for quad, but is improving well thu with loading with . Advised to cont this progression of quad strengthening over time with reassessment again on Biodex ~6-8 weeks from now.     Will need clearance for return to running progression and return to jiu jiFanGou progression around this time.    Thomas Is progressing well towards his goals.   Pt prognosis is Good.     Pt will continue to benefit from skilled outpatient physical therapy to address the deficits listed in the problem list box on initial evaluation, provide pt/family education and to maximize pt's level of independence in the home and community environment.     Pt's spiritual, cultural and educational needs considered and pt agreeable to plan of care and goals.     Anticipated barriers to physical therapy: compliance and understanding of condition     GOALS: Short Term Goals: 0-8 weeks  1.Report decreased R knee pain  < / =  2/10  to increase tolerance for ADLs  2. Increase knee ROM to full and pain free in order to be able to perform ADLs without difficulty.  3. All Biodex Testing within 80% LSI for quad  4. Pt to tolerate HEP to improve ROM and independence with ADL's     Long Term Goals: 7-16  weeks  1.Report decreased R knee pain < / = 1/10  to increase tolerance for ADLs  2.Patient goal: return to running, jumping, and jiu jitsu   3. All Biodex Testing within 90% LSI for quad  4. Able to pass return to sport testing (VAIL)  4. Pt will report at CJ level (20-40% impaired) on FOTO knee to demonstrate increase in LE function with every day tasks.     Plan     Plan of care Certification: 1/12/2024 to 8/14/2024.     Outpatient Physical Therapy 1 times weekly for 12-16 weeks to include the following interventions: Electrical Stimulation NMES, Manual Therapy, Moist Heat/ Ice, Neuromuscular Re-ed, Patient Education, Self Care, Therapeutic Activities, and Therapeutic Exercise.    YOLETTE MEDINA, PT, DPT, OCS

## 2024-06-15 NOTE — PLAN OF CARE
[facility]   Physical Therapy Treatment Note      Name: Thomas Taveras  Clinic Number: [id number]    Therapy Diagnosis:   Encounter Diagnoses   Name Primary?    Right knee pain, unspecified chronicity Yes    Decreased ROM of right knee        Physician: Teodoro Marcus MD    Visit Date: 6/14/2024    Physician Orders: PT Eval and Treat   Medical Diagnosis from Referral:      M12.561 (ICD-10-CM) - Traumatic arthritis of right knee   Z98.890 (ICD-10-CM) - S/P ACL reconstruction      Evaluation Date: 1/12/2024  Authorization Period Expiration: 12/31/2024  Plan of Care Expiration: 8/14/2024  Progress Note Due: 8/14/2024  Visit # / Visits authorized: 1/ 1; 15/20   FOTO: 1/3     Precautions: Standard      Time In: 1437  Time Out: 1527  Total Appointment Time (timed & untimed codes): 50 minutes    Subjective     Pt reports: Reports knee has continued to be doing well. Cont to work out with  and with heavier weights.    FOTO IE - 66%  FOTO 2/23/24 - 74%  FOTO 4/5/24 - 79%  FOTO Goal - 78%    He was compliant with home exercise program.  Response to previous treatment: min complaints  Functional change: ongoing    Pain: 2/10  Location: right knee      Objective      Objective Measures updated at progress report unless specified.     Biodex Lower Extremity Strength LSI 4/19/24: - Physical performance Test   Knee Extension   Knee Flexion     60 deg/sec 61.0% deficit 60 deg/sec 10.1% stronger   180 deg/sec 49.8% deficit 180 deg/sec 1.5% deficit   300 deg/sec 36.8% deficit 300 deg/sec 1.5% stronger     LSI 35-61%    _____________________________________________________________________________________    [employer] Lower Extremity Strength LSI 6/14/24: - Physical performance Test  Knee Extension   Knee Flexion     60 deg/sec 32.7% deficit 60 deg/sec 8.7% stronger   180 deg/sec 21.8% deficit 180 deg/sec 8.2% stronger   300 deg/sec 12.5% deficit 300 deg/sec 4.9% stronger     LSI  70-90%    [employer] Lower Extremity Strength LSI 4/19/24 - 6/14/24: - Physical performance Test x15 min  Knee Extension   Knee Flexion     60 deg/sec 62.6% stronger 60 deg/sec 5.2% stronger   180 deg/sec 57.1% stronger 180 deg/sec 2.9% deficit   300 deg/sec 31.9% stronger 300 deg/sec 0.8% stronger       Treatment     Thomas received the treatments listed below:      neuromuscular re-education activities to improve: Balance, Coordination, Kinesthetic, Sense, Proprioception, and Posture for 10 minutes. The following activities were included:     Patient education:  - cuing for form  - importance of quad NM activation    ASLR 10x10 sec ea tiffany  SL HS Bridge 15x5 sec ea tiffany      NEXT VISIT - Biodex and/or return to sport testing (has not done before) - RTR protocol      Held:  BFR 80% 30:15:15:15:  - SLR 3lbs  - LAQ 15 lbs  - SAQ 15 lbs  - Offset bridge  - SL hip abd  ASLR 3lbs 20x5 sec  - Bridge GTB  - Prone HS curl 7.5lbs  _______________________________________________________________________________________  M-Trigger Biofeedback:  - Eccentric LAQ 10lbs x8 min with 10 sec descent ~700 microV  - Eccentric SAQ 10lbs x8 min with 10 sec descent ~700 microV  - SL Shuttle 4B x8 min w/ 10 sec hold ~700 microV - NT  - SL Shuttle eccentric 4B x20 resps ~700 microV - NT  _________________________________________________________________________________________  - Sidelying SL shuttle 2B1R x6 min w/ 10 sec holds ~700 microV  - Sidelying SL shuttle eccentric 1B1R x15 reps ~700 microV  - Knee extension matrix eccentric 25# DL 3x10  - DL shuttle 3B1R eccentric 2B x15 reps ~500 microV  - DL shuttle 3B1R eccentric 2B x15 reps ~500 microV  - Knee ext iso at 60-70 deg x6 min  w/ 10 sec holds ~800 microV    therapeutic activities to improve functional performance for 10 minutes, including:     Patient education:  - hold on jiu jitsu until clearance  - consistency and frequency of quad specific strengthening   - CONT to progress loads with      Bike x10 min Lvl 5.0      Held:  BFR 80% 30:15:15:15:  - SL Shuttle 1B1R  - SL Shuttle hip extension 1B  Sled push 90# x5 laps  Sled pull 45# x5 laps      Patient Education and Home Exercises       Education provided:   - see above    Written Home Exercises Provided: Patient instructed to cont prior HEP. Exercises were reviewed and Thomas was able to demonstrate them prior to the end of the session.  Thomas demonstrated good  understanding of the education provided. See EMR under Patient Instructions for exercises provided during therapy sessions    Assessment     Patient demonstrates good improvement in quad strength along with significant improvement in LSI since last assessed via [employer] (see above). Patient not at 90% LSI for quad, but is improving well thu with loading with . Advised to cont this progression of quad strengthening over time with reassessment again on Biodex ~6-8 weeks from now.     Will need clearance for return to running progression and return to jiu [employer] progression around this time.    Thomas Is progressing well towards his goals.   Pt prognosis is Good.     Pt will continue to benefit from skilled outpatient physical therapy to address the deficits listed in the problem list box on initial evaluation, provide pt/family education and to maximize pt's level of independence in the home and community environment.     Pt's spiritual, cultural and educational needs considered and pt agreeable to plan of care and goals.     Anticipated barriers to physical therapy: compliance and understanding of condition     GOALS: Short Term Goals: 0-8 weeks  1.Report decreased R knee pain  < / =  2/10  to increase tolerance for ADLs  2. Increase knee ROM to full and pain free in order to be able to perform ADLs without difficulty.  3. All Biodex Testing within 80% LSI for quad  4. Pt to tolerate HEP to improve ROM and independence with ADL's     Long Term Goals: 7-16  weeks  1.Report decreased R knee pain < / = 1/10  to increase tolerance for ADLs  2.Patient goal: return to running, jumping, and jiu jitsu   3. All Biodex Testing within 90% LSI for quad  4. Able to pass return to sport testing (VAIL)  4. Pt will report at CJ level (20-40% impaired) on FOTO knee to demonstrate increase in LE function with every day tasks.     Plan     Plan of care Certification: 1/12/2024 to 8/14/2024.     Outpatient Physical Therapy 1 times weekly for 12-16 weeks to include the following interventions: Electrical Stimulation NMES, Manual Therapy, Moist Heat/ Ice, Neuromuscular Re-ed, Patient Education, Self Care, Therapeutic Activities, and Therapeutic Exercise.    YOLETTE MEDINA, PT, DPT, OCS

## (undated) DEVICE — SUT CTD VICRYL 2.0

## (undated) DEVICE — SEE MEDLINE ITEM 157216

## (undated) DEVICE — COVER OVERHEAD SURG LT BLUE

## (undated) DEVICE — DRAPE U SPLIT SHEET 54X76IN

## (undated) DEVICE — PADDING CAST 6IN DELTA ROLL

## (undated) DEVICE — BLADE KNIFE GRAFT10MM PARALLEL

## (undated) DEVICE — DRAPE STERI INSTRUMENT 1018

## (undated) DEVICE — BURR OVAL 4.0

## (undated) DEVICE — ABLATOR EXTENDED LENGTH

## (undated) DEVICE — PACK SURGERY START

## (undated) DEVICE — GOWN POLY REINF X-LONG 2XL

## (undated) DEVICE — DRESSING AQUACEL SACRAL 9 X 9

## (undated) DEVICE — DRAPE C-ARM/MOBILE XRAY 44X80

## (undated) DEVICE — MAT SUCTION PUDDLEVAC ORANGE

## (undated) DEVICE — SUT HSE FIBER 36IN MO6 NDL

## (undated) DEVICE — BANDAGE ESMARK ELASTIC ST 6X9

## (undated) DEVICE — CONSTANT DIAMETER REAMER

## (undated) DEVICE — KIT ANATOMIC ACL DISPOSABLE

## (undated) DEVICE — BRACE KNEE T SCOPE PREMIER

## (undated) DEVICE — TOURNIQUET SB QC DP 34X4IN

## (undated) DEVICE — NDL SPINAL 18GX3.5 SPINOCAN

## (undated) DEVICE — COVER PROXIMA MAYO STAND

## (undated) DEVICE — GLOVE BIOGEL ORTHOPEDIC 8

## (undated) DEVICE — SUT VICRYL PLUS 0 CT1 18IN

## (undated) DEVICE — LOOP PASSING HI-FI

## (undated) DEVICE — SUT 1 36IN PDS II

## (undated) DEVICE — TUBE SET INFLOW/OUTFLOW

## (undated) DEVICE — GLOVE BIOGEL PI MICRO INDIC 8

## (undated) DEVICE — COVER BACK TBL HD 2-TIER 72IN

## (undated) DEVICE — CONCEPT GRAFIX CORING REAMER

## (undated) DEVICE — GAUZE SPONGE 4X4 12PLY

## (undated) DEVICE — BLADE SURG #15 CARBON STEEL

## (undated) DEVICE — TOWEL OR DISP STRL BLUE 4/PK

## (undated) DEVICE — ADHESIVE DERMABOND ADVANCED

## (undated) DEVICE — STAPLER SKIN ROTATING HEAD

## (undated) DEVICE — SEE L#120831

## (undated) DEVICE — SPONGE LAP 18X18 PREWASHED

## (undated) DEVICE — ELECTRODE REM PLYHSV RETURN 9

## (undated) DEVICE — CLOSURE SKIN STERI STRIP 1/2X4

## (undated) DEVICE — GOWN POLY REINF BRTH SLV LG

## (undated) DEVICE — DRAPE ARTHSCP T ORTHOMAX POUCH

## (undated) DEVICE — DRAPE THREE-QTR REINF 53X77IN

## (undated) DEVICE — BLADE SURG CARBON STEEL SZ11

## (undated) DEVICE — BNDG COFLEX FOAM LF2 ST 6X5YD

## (undated) DEVICE — DRAPE STERI U-SHAPED 47X51IN

## (undated) DEVICE — TUBING SUC UNIV W/CONN 12FT

## (undated) DEVICE — Device

## (undated) DEVICE — SUT ETHILON 3-0 BLK MONO FS

## (undated) DEVICE — DRESSING XEROFORM FOIL PK 1X8

## (undated) DEVICE — BANDAGE MATRIX HK LOOP 6IN 5YD

## (undated) DEVICE — SOL IRR NACL .9% 3000ML

## (undated) DEVICE — PADDING WYTEX UNDRCST 6INX4YD

## (undated) DEVICE — APPLICATOR CHLORAPREP ORN 26ML

## (undated) DEVICE — SHAVER ULTRAFFR 4.2MM

## (undated) DEVICE — TOURNIQUET SB QC DP 44X4IN

## (undated) DEVICE — REAMER GRAFTMAX FLEX 10MM

## (undated) DEVICE — MANIFOLD 4 PORT

## (undated) DEVICE — PACK BASIC

## (undated) DEVICE — SPONGE GAUZE 16PLY 4X4

## (undated) DEVICE — BANDAGE ACE DOUBLE STER 6IN

## (undated) DEVICE — GOWN POLY REINF BRTH SLV XL